# Patient Record
Sex: MALE | Race: WHITE | Employment: FULL TIME | URBAN - METROPOLITAN AREA
[De-identification: names, ages, dates, MRNs, and addresses within clinical notes are randomized per-mention and may not be internally consistent; named-entity substitution may affect disease eponyms.]

---

## 2017-06-08 ENCOUNTER — TRANSFERRED RECORDS (OUTPATIENT)
Dept: HEALTH INFORMATION MANAGEMENT | Facility: CLINIC | Age: 57
End: 2017-06-08

## 2017-06-19 ENCOUNTER — TRANSFERRED RECORDS (OUTPATIENT)
Dept: HEALTH INFORMATION MANAGEMENT | Facility: CLINIC | Age: 57
End: 2017-06-19

## 2018-02-15 ENCOUNTER — TELEPHONE (OUTPATIENT)
Dept: OTHER | Facility: CLINIC | Age: 58
End: 2018-02-15

## 2018-02-15 NOTE — TELEPHONE ENCOUNTER
2/15/2018    Call Regarding Onboarding P1 MMB    Attempt 1    Message on voicemail    Comments: YES-3 DEP      Outreach   Enma Palmer

## 2018-02-19 ENCOUNTER — OFFICE VISIT (OUTPATIENT)
Dept: INTERNAL MEDICINE | Facility: CLINIC | Age: 58
End: 2018-02-19
Payer: COMMERCIAL

## 2018-02-19 VITALS
WEIGHT: 247.9 LBS | OXYGEN SATURATION: 96 % | TEMPERATURE: 98.3 F | DIASTOLIC BLOOD PRESSURE: 72 MMHG | BODY MASS INDEX: 37.69 KG/M2 | SYSTOLIC BLOOD PRESSURE: 122 MMHG | HEART RATE: 99 BPM

## 2018-02-19 DIAGNOSIS — E78.5 HYPERLIPIDEMIA LDL GOAL <100: ICD-10-CM

## 2018-02-19 DIAGNOSIS — Z12.11 SCREEN FOR COLON CANCER: ICD-10-CM

## 2018-02-19 DIAGNOSIS — E11.65 TYPE 2 DIABETES MELLITUS WITH HYPERGLYCEMIA, WITHOUT LONG-TERM CURRENT USE OF INSULIN (H): Primary | ICD-10-CM

## 2018-02-19 DIAGNOSIS — I10 ESSENTIAL HYPERTENSION, BENIGN: ICD-10-CM

## 2018-02-19 LAB — HBA1C MFR BLD: 9 % (ref 4.3–6)

## 2018-02-19 PROCEDURE — 99204 OFFICE O/P NEW MOD 45 MIN: CPT | Performed by: INTERNAL MEDICINE

## 2018-02-19 PROCEDURE — 83036 HEMOGLOBIN GLYCOSYLATED A1C: CPT | Performed by: INTERNAL MEDICINE

## 2018-02-19 PROCEDURE — 36415 COLL VENOUS BLD VENIPUNCTURE: CPT | Performed by: INTERNAL MEDICINE

## 2018-02-19 PROCEDURE — 84443 ASSAY THYROID STIM HORMONE: CPT | Performed by: INTERNAL MEDICINE

## 2018-02-19 PROCEDURE — 82043 UR ALBUMIN QUANTITATIVE: CPT | Performed by: INTERNAL MEDICINE

## 2018-02-19 PROCEDURE — 80053 COMPREHEN METABOLIC PANEL: CPT | Performed by: INTERNAL MEDICINE

## 2018-02-19 PROCEDURE — 80061 LIPID PANEL: CPT | Performed by: INTERNAL MEDICINE

## 2018-02-19 RX ORDER — TRIAMCINOLONE ACETONIDE 1 MG/G
CREAM TOPICAL
Refills: 2 | COMMUNITY
Start: 2017-04-24 | End: 2019-07-05

## 2018-02-19 RX ORDER — DAPAGLIFLOZIN 10 MG/1
10 TABLET, FILM COATED ORAL DAILY
COMMUNITY
End: 2018-09-05

## 2018-02-19 RX ORDER — LOSARTAN POTASSIUM AND HYDROCHLOROTHIAZIDE 25; 100 MG/1; MG/1
1 TABLET ORAL EVERY MORNING
Refills: 3 | COMMUNITY
Start: 2018-01-02 | End: 2018-04-09

## 2018-02-19 RX ORDER — METFORMIN HCL 500 MG
2000 TABLET, EXTENDED RELEASE 24 HR ORAL
COMMUNITY
End: 2018-06-19

## 2018-02-19 NOTE — PROGRESS NOTES
SUBJECTIVE:   Garrick Espinosa is a 57 year old male who presents to clinic today to establish care and f/u on  the following health issues:      Diabetes Follow-up    Patient is checking blood sugars: once daily.  Results are as follows:         am - 126-200    Diabetic concerns: None     Symptoms of hypoglycemia (low blood sugar): none     Paresthesias (numbness or burning in feet) or sores: No     Date of last diabetic eye exam: 2017    Hyperlipidemia Follow-Up      Rate your low fat/cholesterol diet?: good    Taking statin?  Yes, no muscle aches from statin    Other lipid medications/supplements?:  none    Hypertension Follow-up      Outpatient blood pressures are not being checked.    Low Salt Diet: no added salt    BP Readings from Last 2 Encounters:   02/19/18 122/72   05/29/12 132/83     Hemoglobin A1C (%)   Date Value   02/28/2011 8.0 (H)   08/20/2010 7.7 (H)     LDL Cholesterol Calculated (mg/dL)   Date Value   02/28/2011 88   08/20/2010 58       Amount of exercise or physical activity: None    Problems taking medications regularly: No    Medication side effects: none    Diet: low salt      Patient Active Problem List   Diagnosis     Essential hypertension, benign     Hyperlipidemia LDL goal <100     Type 2 diabetes, HbA1C goal < 8% (H)     Obesity     Elevated liver enzymes     Psoriasis     Past Surgical History:   Procedure Laterality Date     C NONSPECIFIC PROCEDURE  12/06    R ankle surgery      NECK SURGERY  2011    hemilaminectomy        Social History   Substance Use Topics     Smoking status: Never Smoker     Smokeless tobacco: Never Used     Alcohol use Yes      Comment: RARELY      Family History   Problem Relation Age of Onset     DIABETES Father      Born 1935, also HTN     Neurologic Disorder Mother      Born ~1937, a few small strokes         Current Outpatient Prescriptions   Medication Sig Dispense Refill     triamcinolone (KENALOG) 0.1 % cream APPLY THIN LAYER TO RASH ON SKIN TWICE DAILY  FOR NO MORE THAN 10-14 DAYS  2     Ascorbic Acid (VITAMIN C PO) Take by mouth daily       Thiamine HCl (VITAMIN B-1 PO) Take by mouth daily       doxylamine (UNISOM) 25 MG TABS tablet Take 25 mg by mouth At Bedtime       MELATONIN PO Take by mouth nightly as needed       ASPIRIN PO Take 162 mg by mouth daily       dapagliflozin (FARXIGA) 10 MG TABS tablet Take 10 mg by mouth daily       GLIPIZIDE XL PO Take 10 mg by mouth daily (with breakfast)       metFORMIN (GLUCOPHAGE-XR) 500 MG 24 hr tablet Take 2,000 mg by mouth daily (with dinner)       simvastatin (ZOCOR) 40 MG tablet Take 40 mg by mouth At Bedtime.       Lancet Devices ([employer] LANCING DEV) MISC 1 strip 4 times daily. 200 each 11     glucose blood VI test strips strip by In Vitro route 4 times daily. 3 Box 3     TYLENOL EXTRA STRENGTH 500 MG PO TABS 1 TABLET EVERY 4 HOURS AS NEEDED       MULTI-DAY VITAMINS OR TABS 1 TABLET DAILY       losartan-hydrochlorothiazide (HYZAAR) 100-25 MG per tablet Take 1 tablet by mouth every morning  3     ALLEGRA 180 MG OR TABS 1 daily for allergy (Patient not taking: No sig reported) 90 3       Reviewed and updated as needed this visit by clinical staff  Tobacco  Allergies  Meds  Problems  Med Hx  Surg Hx  Fam Hx  Soc Hx        Reviewed and updated as needed this visit by Provider         ROS:  CONSTITUTIONAL: NEGATIVE for fever, chills, change in weight  EYES: NEGATIVE for vision changes or irritation  ENT/MOUTH: NEGATIVE for ear, mouth and throat problems  RESP: NEGATIVE for significant cough or SOB  CV: NEGATIVE for chest pain, palpitations or peripheral edema  MUSCULOSKELETAL: NEGATIVE for significant arthralgias or myalgia  NEURO: NEGATIVE for weakness, dizziness or paresthesias  ENDOCRINE: NEGATIVE for temperature intolerance, skin/hair changes  ROS otherwise negative    OBJECTIVE:                                                    /72  Pulse 99  Temp 98.3  F (36.8  C) (Oral)  Wt 247 lb 14.4  oz (112.4 kg)  SpO2 96%  BMI 37.69 kg/m2  Body mass index is 37.69 kg/(m^2).   GENERAL: healthy, alert, well nourished, well hydrated, no distress  EYES: Eyes grossly normal to inspection, extraocular movements - intact, and PERRL  HENT: ear canals- normal; TMs- normal; Nose- normal; Mouth- no ulcers, no lesions  NECK: no tenderness, no adenopathy, no asymmetry, no masses, no stiffness; thyroid- normal to palpation  RESP: lungs clear to auscultation - no rales, no rhonchi, no wheezes  CV: regular rates and rhythm, normal S1 S2,    MS: extremities- no gross deformities noted, no edema  NEURO: strength and tone- normal, sensory exam- grossly normal, mentation- intact, speech- normal, reflexes- symmetric  Diabetic foot exam: normal DP and PT pulses, no trophic changes or ulcerative lesions, normal sensory exam and normal monofilament exam         ASSESSMENT/PLAN:                                                        1. Type 2 diabetes mellitus with hyperglycemia, without long-term current use of insulin (H)  --On metformin.500 mg -4 tablets daily, also on  Farxiga 10 mg daily and glipizide 10 mg daily,   --A1c is 9.0 - Diabetes is not under control, increase glipizide to 10 mg twice daily, continue metformin and Farxiga  as before,advised to  follow ADA diet and regular exercise and will repeat A1c in 3 months.  - Comprehensive metabolic panel  - Lipid panel reflex to direct LDL Fasting  - TSH with free T4 reflex  - Hemoglobin A1c  - Albumin Random Urine Quantitative with Creat Ratio      2. Hyperlipidemia LDL goal <100  -Check lipid panel, on simvastatin 40 mg daily, triglycerides high, follow low-fat diet regular exercise.      3. Essential hypertension, benign  -Blood pressure well controlled. Continue losartan hydrochlorothiazide 100/25 mg daily.Advised to follow low salt diet and exercise and f/u in 6 mths.       4. Screen for colon cancer  --Patient does not like to do colonoscopy. Will get stool test.  -  Fecal colorectal cancer screen FIT; Future       Odette Hicks MD  [facility]

## 2018-02-19 NOTE — NURSING NOTE
"Chief Complaint   Patient presents with     Hypertension     Diabetes     Lipids       Initial /72  Pulse 99  Temp 98.3  F (36.8  C) (Oral)  Wt 247 lb 14.4 oz (112.4 kg)  SpO2 96%  BMI 37.69 kg/m2 Estimated body mass index is 37.69 kg/(m^2) as calculated from the following:    Height as of 5/29/12: 5' 8\" (1.727 m).    Weight as of this encounter: 247 lb 14.4 oz (112.4 kg).  Medication Reconciliation: complete    "

## 2018-02-19 NOTE — MR AVS SNAPSHOT
After Visit Summary   2/19/2018    Garrick Espinosa    MRN: [id number]           Patient Information     Date Of Birth          1960        Visit Information        Provider Department      2/19/2018 9:20 AM Odette Hicks MD [facility]        Today's Diagnoses     Type 2 diabetes mellitus with hyperglycemia, without long-term current use of insulin (H)    -  1    Hyperlipidemia LDL goal <100        Essential hypertension, benign        Screen for colon cancer           Follow-ups after your visit        Who to contact     If you have questions or need follow up information about today's clinic visit or your schedule please contact [facility] directly at [phone].  Normal or non-critical lab and imaging results will be communicated to you by MyChart, letter or phone within 4 business days after the clinic has received the results. If you do not hear from us within 7 days, please contact the clinic through [employer] or phone. If you have a critical or abnormal lab result, we will notify you by phone as soon as possible.  Submit refill requests through [employer] or call your pharmacy and they will forward the refill request to us. Please allow 3 business days for your refill to be completed.          Additional Information About Your Visit        MyChart Information     [employer] gives you secure access to your electronic health record. If you see a primary care provider, you can also send messages to your care team and make appointments. If you have questions, please call your primary care clinic.  If you do not have a primary care provider, please call [phone] and they will assist you.        Care EveryWhere ID     This is your Care EveryWhere ID. This could be used by other organizations to access your [place] medical records  [id number]        Your Vitals Were     Pulse Temperature Pulse Oximetry BMI (Body Mass Index)          99 98.3  F (36.8   C) (Oral) 96% 37.69 kg/m2         Blood Pressure from Last 3 Encounters:   02/19/18 122/72   05/29/12 132/83   06/03/11 114/60    Weight from Last 3 Encounters:   02/19/18 247 lb 14.4 oz (112.4 kg)   05/29/12 271 lb (122.9 kg)   06/03/11 267 lb 8 oz (121.3 kg)              We Performed the Following     Albumin Random Urine Quantitative with Creat Ratio     Comprehensive metabolic panel     Hemoglobin A1c     Lipid panel reflex to direct LDL Fasting     TSH with free T4 reflex          Today's Medication Changes          These changes are accurate as of 2/19/18 11:59 PM.  If you have any questions, ask your nurse or doctor.               These medicines have changed or have updated prescriptions.        Dose/Directions    glipiZIDE XL 10 MG 24 hr tablet   This may have changed:    - medication strength  - when to take this   Generic drug:  glipiZIDE   Changed by:  Odette Hicks MD        Dose:  10 mg   Take 1 tablet (10 mg) by mouth 2 times daily   Quantity:  60 tablet   Refills:  3       metFORMIN 500 MG 24 hr tablet   Commonly known as:  GLUCOPHAGE-XR   This may have changed:  Another medication with the same name was removed. Continue taking this medication, and follow the directions you see here.   Changed by:  Odette Hicks MD        Dose:  2000 mg   Take 2,000 mg by mouth daily (with dinner)   Refills:  0         Stop taking these medicines if you haven't already. Please contact your care team if you have questions.     aspirin 81 MG tablet   Stopped by:  Odette Hicks MD           FLEXERIL 5 MG tablet   Generic drug:  cyclobenzaprine   Stopped by:  Odette Hicks MD                    Primary Care Provider Office Phone # Fax #    Odette Hicks [phone]       [address] [facility] [address]        Equal Access to Services     UDAY SOLIS AH: Jessa Alexander, donnie gilbert, jason wells,  diamond simonserene farah'aan ah. So [facility] [phone].    ATENCIÓN: Si maryla brennon, tiene a valente disposición servicios gratuitos de asistencia lingüística. Pratik bhatia [phone].    We comply with applicable federal civil rights laws and [place] laws. We do not discriminate on the basis of race, color, national origin, age, disability, sex, sexual orientation, or gender identity.            Thank you!     Thank you for choosing [facility]  for your care. Our goal is always to provide you with excellent care. Hearing back from our patients is one way we can continue to improve our services. Please take a few minutes to complete the written survey that you may receive in the mail after your visit with us. Thank you!             Your Updated Medication List - Protect others around you: Learn how to safely use, store and throw away your medicines at www.disposemymeds.org.          This list is accurate as of 2/19/18 11:59 PM.  Always use your most recent med list.                   Brand Name Dispense Instructions for use Diagnosis    ALLEGRA 180 MG tablet   Generic drug:  fexofenadine     90    1 daily for allergy    Allergies       ASPIRIN PO      Take 162 mg by mouth daily        blood glucose monitoring test strip    no brand specified    3 Box    by In Vitro route 4 times daily.    Type 2 diabetes, HbA1c goal < 7% (H)       dapagliflozin 10 MG Tabs tablet    FARXIGA     Take 10 mg by mouth daily        doxylamine 25 MG Tabs tablet    UNISOM     Take 25 mg by mouth At Bedtime        glipiZIDE XL 10 MG 24 hr tablet   Generic drug:  glipiZIDE     60 tablet    Take 1 tablet (10 mg) by mouth 2 times daily        losartan-hydrochlorothiazide 100-25 MG per tablet    HYZAAR     Take 1 tablet by mouth every morning        MELATONIN PO      Take by mouth nightly as needed        metFORMIN 500 MG 24 hr tablet    GLUCOPHAGE-XR     Take 2,000 mg by mouth daily (with dinner)        MULTI-DAY vitamin   S Tabs      1 TABLET DAILY    Routine general medical examination at a health care facility       SIMPLE DIAGNOSTICS LANCING DEV Misc     200 each    1 strip 4 times daily.        simvastatin 40 MG tablet    ZOCOR     Take 40 mg by mouth At Bedtime.        triamcinolone 0.1 % cream    KENALOG     APPLY THIN LAYER TO RASH ON SKIN TWICE DAILY FOR NO MORE THAN 10-14 DAYS        TYLENOL 500 MG tablet   Generic drug:  acetaminophen      1 TABLET EVERY 4 HOURS AS NEEDED        VITAMIN B-1 PO      Take by mouth daily        VITAMIN C PO      Take by mouth daily

## 2018-02-20 LAB
ALBUMIN SERPL-MCNC: 4 G/DL (ref 3.4–5)
ALP SERPL-CCNC: 64 U/L (ref 40–150)
ALT SERPL W P-5'-P-CCNC: 35 U/L (ref 0–70)
ANION GAP SERPL CALCULATED.3IONS-SCNC: 9 MMOL/L (ref 3–14)
AST SERPL W P-5'-P-CCNC: 45 U/L (ref 0–45)
BILIRUB SERPL-MCNC: 0.4 MG/DL (ref 0.2–1.3)
BUN SERPL-MCNC: 20 MG/DL (ref 7–30)
CALCIUM SERPL-MCNC: 9 MG/DL (ref 8.5–10.1)
CHLORIDE SERPL-SCNC: 105 MMOL/L (ref 94–109)
CHOLEST SERPL-MCNC: 170 MG/DL
CO2 SERPL-SCNC: 27 MMOL/L (ref 20–32)
CREAT SERPL-MCNC: 1.02 MG/DL (ref 0.66–1.25)
CREAT UR-MCNC: 63 MG/DL
GFR SERPL CREATININE-BSD FRML MDRD: 75 ML/MIN/1.7M2
GLUCOSE SERPL-MCNC: 177 MG/DL (ref 70–99)
HDLC SERPL-MCNC: 40 MG/DL
LDLC SERPL CALC-MCNC: 52 MG/DL
MICROALBUMIN UR-MCNC: 20 MG/L
MICROALBUMIN/CREAT UR: 32.59 MG/G CR (ref 0–17)
NONHDLC SERPL-MCNC: 130 MG/DL
POTASSIUM SERPL-SCNC: 4.4 MMOL/L (ref 3.4–5.3)
PROT SERPL-MCNC: 7.5 G/DL (ref 6.8–8.8)
SODIUM SERPL-SCNC: 141 MMOL/L (ref 133–144)
TRIGL SERPL-MCNC: 392 MG/DL
TSH SERPL DL<=0.005 MIU/L-ACNC: 3.11 MU/L (ref 0.4–4)

## 2018-02-24 RX ORDER — GLIPIZIDE 10 MG/1
10 TABLET, FILM COATED, EXTENDED RELEASE ORAL
Qty: 60 TABLET | Refills: 3 | COMMUNITY
Start: 2018-02-24 | End: 2018-04-01

## 2018-03-12 PROCEDURE — 82274 ASSAY TEST FOR BLOOD FECAL: CPT | Performed by: INTERNAL MEDICINE

## 2018-03-14 DIAGNOSIS — Z12.11 SCREEN FOR COLON CANCER: ICD-10-CM

## 2018-03-14 LAB — HEMOCCULT STL QL IA: NEGATIVE

## 2018-03-20 NOTE — TELEPHONE ENCOUNTER
3/20/2018    Call Regarding Onboarding P1 MMB    Attempt 2    Message on voicemail     Comments: 1 adult, 2 child       Outreach   CC

## 2018-04-03 PROBLEM — E11.9 TYPE 2 DIABETES MELLITUS WITHOUT COMPLICATION, WITHOUT LONG-TERM CURRENT USE OF INSULIN (H): Status: ACTIVE | Noted: 2018-04-03

## 2018-04-19 NOTE — TELEPHONE ENCOUNTER
4/19/2018      Call Regarding Onboarding P1 MMB     Attempt 3     Message on voicemail      Comments:       Outreach   JUDE VALERO

## 2018-04-20 ENCOUNTER — TRANSFERRED RECORDS (OUTPATIENT)
Dept: HEALTH INFORMATION MANAGEMENT | Facility: CLINIC | Age: 58
End: 2018-04-20

## 2018-05-16 ENCOUNTER — MYC REFILL (OUTPATIENT)
Dept: INTERNAL MEDICINE | Facility: CLINIC | Age: 58
End: 2018-05-16

## 2018-05-16 DIAGNOSIS — E11.9 TYPE 2 DIABETES MELLITUS WITHOUT COMPLICATION, WITHOUT LONG-TERM CURRENT USE OF INSULIN (H): ICD-10-CM

## 2018-05-17 RX ORDER — GLIPIZIDE 10 MG/1
10 TABLET, FILM COATED, EXTENDED RELEASE ORAL
Qty: 60 TABLET | Refills: 0 | Status: SHIPPED | OUTPATIENT
Start: 2018-05-17 | End: 2018-06-25

## 2018-05-17 NOTE — TELEPHONE ENCOUNTER
Message from MyChart:  Original authorizing provider: MD Garrick Mcbride would like a refill of the following medications:  glipiZIDE (GLIPIZIDE XL) 10 MG 24 hr tablet [Odette Hicks MD]    Preferred pharmacy: [address] [address] [address]    Comment:

## 2018-05-17 NOTE — TELEPHONE ENCOUNTER
"Requested Prescriptions   Pending Prescriptions Disp Refills     glipiZIDE (GLIPIZIDE XL) 10 MG 24 hr tablet 60 tablet 1     Sig: Take 1 tablet (10 mg) by mouth 2 times daily    Sulfonylurea Agents Passed    5/17/2018 11:19 AM       Passed - Blood pressure less than 140/90 in past 6 months    BP Readings from Last 3 Encounters:   02/19/18 122/72   05/29/12 132/83   06/03/11 114/60                Passed - Patient has documented LDL within the past 12 mos.    Recent Labs   Lab Test  02/19/18   1006   LDL  52            Passed - Patient has had a Microalbumin in the past 12 mos.    Recent Labs   Lab Test  02/19/18   1006   MICROL  20   UMALCR  32.59*            Passed - Patient has documented A1c within the specified period of time.    If HgbA1C is 8 or greater, it needs to be on file within the past 3 months.  If less than 8, must be on file within the past 6 months.     Recent Labs   Lab Test  02/19/18   1006   A1C  9.0*            Passed - Patient is age 18 or older       Passed - Patient has a recent creatinine (normal) within the past 12 mos.    Recent Labs   Lab Test  02/19/18   1006   CR  1.02            Passed - Recent (6 mo) or future (30 days) visit within the authorizing provider's specialty    Patient had office visit in the last 6 months or has a visit in the next 30 days with authorizing provider or within the authorizing provider's specialty.  See \"Patient Info\" tab in inbasket, or \"Choose Columns\" in Meds & Orders section of the refill encounter.              Per 2-19-18 result note message from PCP:  Diabetes is not under control, A1c is 9.0 , please increase glipizide to 10 mg twice daily and will repeat A1c in 3 months.    Med refilled x 1.  And mychart message sent advising pt he is due for A1C in May 2018.    Future order in chart.        "

## 2018-05-25 ENCOUNTER — MYC MEDICAL ADVICE (OUTPATIENT)
Dept: INTERNAL MEDICINE | Facility: CLINIC | Age: 58
End: 2018-05-25

## 2018-06-19 DIAGNOSIS — E11.9 TYPE 2 DIABETES MELLITUS WITHOUT COMPLICATION, WITHOUT LONG-TERM CURRENT USE OF INSULIN (H): Primary | ICD-10-CM

## 2018-06-19 RX ORDER — METFORMIN HCL 500 MG
2000 TABLET, EXTENDED RELEASE 24 HR ORAL
Qty: 120 TABLET | Refills: 3 | Status: SHIPPED | OUTPATIENT
Start: 2018-06-19 | End: 2018-07-24

## 2018-06-19 NOTE — TELEPHONE ENCOUNTER
Metformin faxed, A1c was high  at last OV, advise to recheck A1c in 05/18. Please advise lab appt to recheck A1c.

## 2018-06-19 NOTE — TELEPHONE ENCOUNTER
Last Written Prescription Date:  unknown  Last Fill Quantity: unknown,  # refills: unknown   Last office visit: 2/19/2018 with prescribing provider:  02/19/18 Dr. Hicks   Future Office Visit:   Next 5 appointments (look out 90 days)     Jul 24, 2018  5:20 PM CDT   Minnie Cuevas with Ibrahima Smith MD   [facility]    [address] [place] [id number] [phone]

## 2018-06-19 NOTE — TELEPHONE ENCOUNTER
Spoke with Patient , he states there was a Medcal emergency with his mother ( she fell and had multiple fractures of  her facial bones ) and missed his appiontment he will make another lab only appiontment JOSE ELIAS Jhaveri LPN

## 2018-06-25 DIAGNOSIS — E11.9 TYPE 2 DIABETES MELLITUS WITHOUT COMPLICATION, WITHOUT LONG-TERM CURRENT USE OF INSULIN (H): ICD-10-CM

## 2018-06-27 NOTE — TELEPHONE ENCOUNTER
"Requested Prescriptions   Pending Prescriptions Disp Refills     simvastatin (ZOCOR) 40 MG tablet [Pharmacy Med Name: SIMVASTATIN 40 MG TABLET] 30 tablet 0    Requested Prescriptions   Pending Prescriptions Disp Refills     simvastatin (ZOCOR) 40 MG tablet [Pharmacy Med Name: SIMVASTATIN 40 MG TABLET] 30 tablet 0    Last Written Prescription Date:  historic  Last Fill Quantity: n/a,  # refills: n/a   Last office visit: 2/19/2018 with prescribing provider:     Future Office Visit:   Next 5 appointments (look out 90 days)     Jul 24, 2018  5:20 PM CDT   Minnie Cuevas [facility] Smith [facility]    [address] [place]  [facility] [id number] [phone]                Sig: TAKE 1 TAB BY MOUTH AT BEDTIME    Statins Protocol Passed    6/25/2018  8:58 PM       Passed - LDL on file in past 12 months    Recent Labs   Lab Test  02/19/18   1006   LDL  52            Passed - No abnormal creatine kinase in past 12 months    Recent Labs   Lab Test  05/21/10   0817   CKT  190               Passed - Recent (12 mo) or future (30 days) visit within the authorizing provider's specialty    Patient had office visit in the last 12 months or has a visit in the next 30 days with authorizing provider or within the authorizing provider's specialty.  See \"Patient Info\" tab in inbasket, or \"Choose Columns\" in Meds & Orders section of the refill encounter.           Passed - Patient is age 18 or older        glipiZIDE (GLUCOTROL XL) 10 MG 24 hr tablet [Pharmacy Med Name: GLIPIZIDE ER 10 MG TABLET] 60 tablet 0    Last Written Prescription Date:  05/17/2018  Last Fill Quantity: 60,  # refills: 0   Last office visit: 2/19/2018 with prescribing provider:     Future Office Visit:   Next 5 appointments (look out 90 days)     Jul 24, 2018  5:20 PM CDT   Minnie Cuevas with [facility] Smith [facility]   [facility]    [address]  [facility] [address]   [phone]                Sig: TAKE 1 TABLET (10 MG) BY MOUTH 2 TIMES DAILY    Sulfonylurea Agents Failed    6/25/2018  8:58 PM       Failed - Patient has documented A1c within the specified period of time.    If HgbA1C is 8 or greater, it needs to be on file within the past 3 months.  If less than 8, must be on file within the past 6 months.     Recent Labs   Lab Test  02/19/18   1006   A1C  9.0*            Passed - Blood pressure less than 140/90 in past 6 months    BP Readings from Last 3 Encounters:   02/19/18 122/72   05/29/12 132/83   06/03/11 114/60                Passed - Patient has documented LDL within the past 12 mos.    Recent Labs   Lab Test  02/19/18   1006   LDL  52            Passed - Patient has had a Microalbumin in the past 12 mos.    Recent Labs   Lab Test  02/19/18   1006   MICROL  20   UMALCR  32.59*            Passed - Patient is age 18 or older       Passed - Patient has a recent creatinine (normal) within the past 12 mos.    Recent Labs   Lab Test  02/19/18   1006   CR  1.02            Passed - Recent (6 mo) or future (30 days) visit within the authorizing provider's specialty    Patient had office visit in the last 6 months or has a visit in the next 30 days with authorizing provider or within the authorizing provider's specialty.  See \"Patient Info\" tab in inbasket, or \"Choose Columns\" in Meds & Orders section of the refill encounter.           Sig: TAKE 1 TAB BY MOUTH AT BEDTIME    Statins Protocol Passed    6/25/2018  8:58 PM       Passed - LDL on file in past 12 months    Recent Labs   Lab Test  02/19/18   1006   LDL  52            Passed - No abnormal creatine kinase in past 12 months    Recent Labs   Lab Test  05/21/10   0817   CKT  190               Passed - Recent (12 mo) or future (30 days) visit within the authorizing provider's specialty    Patient had office visit in the last 12 months or has a visit in the next 30 days with authorizing " "provider or within the authorizing provider's specialty.  See \"Patient Info\" tab in inbasket, or \"Choose Columns\" in Meds & Orders section of the refill encounter.           Passed - Patient is age 18 or older        glipiZIDE (GLUCOTROL XL) 10 MG 24 hr tablet [Pharmacy Med Name: GLIPIZIDE ER 10 MG TABLET] 60 tablet 0     Sig: TAKE 1 TABLET (10 MG) BY MOUTH 2 TIMES DAILY    Sulfonylurea Agents Failed    6/25/2018  8:58 PM       Failed - Patient has documented A1c within the specified period of time.    If HgbA1C is 8 or greater, it needs to be on file within the past 3 months.  If less than 8, must be on file within the past 6 months.     Recent Labs   Lab Test  02/19/18   1006   A1C  9.0*            Passed - Blood pressure less than 140/90 in past 6 months    BP Readings from Last 3 Encounters:   02/19/18 122/72   05/29/12 132/83   06/03/11 114/60                Passed - Patient has documented LDL within the past 12 mos.    Recent Labs   Lab Test  02/19/18   1006   LDL  52            Passed - Patient has had a Microalbumin in the past 12 mos.    Recent Labs   Lab Test  02/19/18   1006   MICROL  20   UMALCR  32.59*            Passed - Patient is age 18 or older       Passed - Patient has a recent creatinine (normal) within the past 12 mos.    Recent Labs   Lab Test  02/19/18   1006   CR  1.02            Passed - Recent (6 mo) or future (30 days) visit within the authorizing provider's specialty    Patient had office visit in the last 6 months or has a visit in the next 30 days with authorizing provider or within the authorizing provider's specialty.  See \"Patient Info\" tab in inbasket, or \"Choose Columns\" in Meds & Orders section of the refill encounter.            "

## 2018-06-28 RX ORDER — GLIPIZIDE 10 MG/1
TABLET, FILM COATED, EXTENDED RELEASE ORAL
Qty: 60 TABLET | Refills: 0 | Status: SHIPPED | OUTPATIENT
Start: 2018-06-28 | End: 2018-07-24

## 2018-06-28 RX ORDER — SIMVASTATIN 40 MG
TABLET ORAL
Qty: 30 TABLET | Refills: 0 | Status: SHIPPED | OUTPATIENT
Start: 2018-06-28 | End: 2018-07-24

## 2018-07-24 ENCOUNTER — OFFICE VISIT (OUTPATIENT)
Dept: INTERNAL MEDICINE | Facility: CLINIC | Age: 58
End: 2018-07-24
Payer: COMMERCIAL

## 2018-07-24 VITALS
DIASTOLIC BLOOD PRESSURE: 72 MMHG | OXYGEN SATURATION: 97 % | HEIGHT: 68 IN | WEIGHT: 255 LBS | TEMPERATURE: 98.7 F | HEART RATE: 87 BPM | SYSTOLIC BLOOD PRESSURE: 118 MMHG | BODY MASS INDEX: 38.65 KG/M2

## 2018-07-24 DIAGNOSIS — I10 ESSENTIAL HYPERTENSION, BENIGN: ICD-10-CM

## 2018-07-24 DIAGNOSIS — Z12.5 SCREENING FOR PROSTATE CANCER: ICD-10-CM

## 2018-07-24 DIAGNOSIS — E11.9 TYPE 2 DIABETES MELLITUS WITHOUT COMPLICATION, WITHOUT LONG-TERM CURRENT USE OF INSULIN (H): ICD-10-CM

## 2018-07-24 DIAGNOSIS — E78.5 HYPERLIPIDEMIA LDL GOAL <100: ICD-10-CM

## 2018-07-24 DIAGNOSIS — Z00.00 ROUTINE GENERAL MEDICAL EXAMINATION AT A HEALTH CARE FACILITY: Primary | ICD-10-CM

## 2018-07-24 DIAGNOSIS — Z12.11 SPECIAL SCREENING FOR MALIGNANT NEOPLASMS, COLON: ICD-10-CM

## 2018-07-24 DIAGNOSIS — Z23 NEED FOR TDAP VACCINATION: ICD-10-CM

## 2018-07-24 DIAGNOSIS — Z02.9 ADMINISTRATIVE ENCOUNTER: ICD-10-CM

## 2018-07-24 LAB — HBA1C MFR BLD: 9.1 % (ref 0–5.6)

## 2018-07-24 PROCEDURE — 99396 PREV VISIT EST AGE 40-64: CPT | Mod: 25 | Performed by: INTERNAL MEDICINE

## 2018-07-24 PROCEDURE — 99213 OFFICE O/P EST LOW 20 MIN: CPT | Mod: 25 | Performed by: INTERNAL MEDICINE

## 2018-07-24 PROCEDURE — 90471 IMMUNIZATION ADMIN: CPT | Performed by: INTERNAL MEDICINE

## 2018-07-24 PROCEDURE — G0103 PSA SCREENING: HCPCS | Performed by: INTERNAL MEDICINE

## 2018-07-24 PROCEDURE — 83036 HEMOGLOBIN GLYCOSYLATED A1C: CPT | Performed by: INTERNAL MEDICINE

## 2018-07-24 PROCEDURE — 36415 COLL VENOUS BLD VENIPUNCTURE: CPT | Performed by: INTERNAL MEDICINE

## 2018-07-24 PROCEDURE — 90715 TDAP VACCINE 7 YRS/> IM: CPT | Performed by: INTERNAL MEDICINE

## 2018-07-24 RX ORDER — PIOGLITAZONEHYDROCHLORIDE 15 MG/1
15 TABLET ORAL DAILY
Qty: 90 TABLET | Refills: 1 | Status: SHIPPED | OUTPATIENT
Start: 2018-07-24 | End: 2019-01-24

## 2018-07-24 RX ORDER — METFORMIN HCL 500 MG
2000 TABLET, EXTENDED RELEASE 24 HR ORAL
Qty: 480 TABLET | Refills: 3 | Status: SHIPPED | OUTPATIENT
Start: 2018-07-24 | End: 2019-09-12

## 2018-07-24 RX ORDER — GLIPIZIDE 10 MG/1
TABLET, FILM COATED, EXTENDED RELEASE ORAL
Qty: 180 TABLET | Refills: 3 | Status: SHIPPED | OUTPATIENT
Start: 2018-07-24 | End: 2019-07-22

## 2018-07-24 RX ORDER — SIMVASTATIN 40 MG
TABLET ORAL
Qty: 90 TABLET | Refills: 3 | Status: SHIPPED | OUTPATIENT
Start: 2018-07-24 | End: 2019-07-22

## 2018-07-24 NOTE — MR AVS SNAPSHOT
After Visit Summary   7/24/2018    Garrick Espinosa    MRN: [id number]           Patient Information     Date Of Birth          1960        Visit Information        Provider Department      7/24/2018 5:20 PM Ibrahima Smith MD [facility]        Today's Diagnoses     Screening for prostate cancer    -  1    Type 2 diabetes mellitus without complication, without long-term current use of insulin (H)        Special screening for malignant neoplasms, colon        Routine general medical examination at a health care facility          Care Instructions      Preventive Health Recommendations  Male Ages 50 - 64    Yearly exam:             See your health care provider every year in order to  o   Review health changes.   o   Discuss preventive care.    o   Review your medicines if your doctor has prescribed any.     Have a cholesterol test every 5 years, or more frequently if you are at risk for high cholesterol/heart disease.     Have a diabetes test (fasting glucose) every three years. If you are at risk for diabetes, you should have this test more often.     Have a colonoscopy at age 50, or have a yearly FIT test (stool test). These exams will check for colon cancer.      Talk with your health care provider about whether or not a prostate cancer screening test (PSA) is right for you.    You should be tested each year for STDs (sexually transmitted diseases), if you re at risk.     Shots: Get a flu shot each year. Get a tetanus shot every 10 years.     Nutrition:    Eat at least 5 servings of fruits and vegetables daily.     Eat whole-grain bread, whole-wheat pasta and brown rice instead of white grains and rice.     Get adequate Calcium and Vitamin D.     Lifestyle    Exercise for at least 150 minutes a week (30 minutes a day, 5 days a week). This will help you control your weight and prevent disease.     Limit alcohol to one drink per day.     No smoking.     Wear sunscreen to  prevent skin cancer.     See your dentist every six months for an exam and cleaning.     See your eye doctor every 1 to 2 years.            Follow-ups after your visit        Additional Services     GASTROENTEROLOGY ADULT REF PROCEDURE ONLY       Last Lab Result: Creatinine (mg/dL)       Date                     Value                 02/19/2018               1.02             ----------  Body mass index is 38.77 kg/(m^2).     Needed:  No  Language:  English    Patient will be contacted to schedule procedure.     Please be aware that coverage of these services is subject to the terms and limitations of your health insurance plan.  Call member services at your health plan with any benefit or coverage questions.  Any procedures must be performed at a [place] facility OR coordinated by your clinic's referral office.    Please bring the following with you to your appointment:    (1) Any X-Rays, CTs or MRIs which have been performed.  Contact the facility where they were done to arrange for  prior to your scheduled appointment.    (2) List of current medications   (3) This referral request   (4) Any documents/labs given to you for this referral                  Who to contact     If you have questions or need follow up information about today's clinic visit or your schedule please contact [facility] directly at [phone].  Normal or non-critical lab and imaging results will be communicated to you by MyChart, letter or phone within 4 business days after the clinic has received the results. If you do not hear from us within 7 days, please contact the clinic through MyChart or phone. If you have a critical or abnormal lab result, we will notify you by phone as soon as possible.  Submit refill requests through [employer] or call your pharmacy and they will forward the refill request to us. Please allow 3 business days for your refill to be completed.          Additional Information About  "Your Visit        [employer] Information     [employer] gives you secure access to your electronic health record. If you see a primary care provider, you can also send messages to your care team and make appointments. If you have questions, please call your primary care clinic.  If you do not have a primary care provider, please call [phone] and they will assist you.        Care EveryWhere ID     This is your Care EveryWhere ID. This could be used by other organizations to access your [place] medical records  [id number]        Your Vitals Were     Pulse Temperature Height Pulse Oximetry BMI (Body Mass Index)       87 98.7  F (37.1  C) (Oral) 5' 8\" (1.727 m) 97% 38.77 kg/m2        Blood Pressure from Last 3 Encounters:   07/24/18 118/72   02/19/18 122/72   05/29/12 132/83    Weight from Last 3 Encounters:   07/24/18 255 lb (115.7 kg)   02/19/18 247 lb 14.4 oz (112.4 kg)   05/29/12 271 lb (122.9 kg)              We Performed the Following     **A1C FUTURE 1yr     GASTROENTEROLOGY ADULT REF PROCEDURE ONLY     Prostate spec antigen screen          Today's Medication Changes          These changes are accurate as of 7/24/18  6:08 PM.  If you have any questions, ask your nurse or doctor.               Start taking these medicines.        Dose/Directions    pioglitazone 15 MG tablet   Commonly known as:  ACTOS   Used for:  Type 2 diabetes mellitus without complication, without long-term current use of insulin (H)   Started by:  Ibrahima Smith MD        Dose:  15 mg   Take 1 tablet (15 mg) by mouth daily   Quantity:  90 tablet   Refills:  1         These medicines have changed or have updated prescriptions.        Dose/Directions    ALLEGRA 180 MG tablet   This may have changed:  See the new instructions.   Used for:  Allergies   Generic drug:  fexofenadine        1 daily for allergy   Quantity:  90   Refills:  3            Where to get your medicines      These medications were sent to [id number] [place] [facility] " [place], [place] [facility] [address] [facility] [address] [address]  [address], [place] [facility] [address]     Phone:  [phone]     glipiZIDE 10 MG 24 hr tablet    metFORMIN 500 MG 24 hr tablet    pioglitazone 15 MG tablet    simvastatin 40 MG tablet                Primary Care Provider Office Phone # Fax #    Odette ASIF MD Fabi[place] [phone]       [address] [facility] [address]        Equal Access to Services     [facility] : Hadii aad ku hadasho Soomaali, waaxda luqadaha, qaybta kaalmada adeegyada, waxay idiin hayaan adeeg kharash la'aan . So [facility] [phone].    ATENCIÓN: Si habla español, tiene a valente disposición servicios gratuitos de asistencia lingüística. [facility] [phone].    We comply with applicable federal civil rights laws and [place] laws. We do not discriminate on the basis of race, color, national origin, age, disability, sex, sexual orientation, or gender identity.            Thank you!     Thank you for choosing [facility]  for your care. Our goal is always to provide you with excellent care. Hearing back from our patients is one way we can continue to improve our services. Please take a few minutes to complete the written survey that you may receive in the mail after your visit with us. Thank you!             Your Updated Medication List - Protect others around you: Learn how to safely use, store and throw away your medicines at www.disposemymeds.org.          This list is accurate as of 7/24/18  6:08 PM.  Always use your most recent med list.                   Brand Name Dispense Instructions for use Diagnosis    ALLEGRA 180 MG tablet   Generic drug:  fexofenadine     90    1 daily for allergy    Allergies       ASPIRIN PO      Take 162 mg by mouth daily        blood glucose monitoring test strip    no brand specified    3 Box    by In Vitro route 4 times daily.    Type 2 diabetes, HbA1c goal < 7% (H)       dapagliflozin 10 MG Tabs tablet    FARXIGA     Take 10 mg by  mouth daily        doxylamine 25 MG Tabs tablet    UNISOM     Take 25 mg by mouth At Bedtime        glipiZIDE 10 MG 24 hr tablet    GLUCOTROL XL    180 tablet    TAKE 1 TABLET (10 MG) BY MOUTH 2 TIMES DAILY    Type 2 diabetes mellitus without complication, without long-term current use of insulin (H)       losartan-hydrochlorothiazide 100-25 MG per tablet    HYZAAR    90 tablet    Take 1 tablet by mouth every morning    Essential hypertension, benign       MELATONIN PO      Take by mouth nightly as needed        metFORMIN 500 MG 24 hr tablet    GLUCOPHAGE-XR    480 tablet    Take 4 tablets (2,000 mg) by mouth daily (with dinner)    Type 2 diabetes mellitus without complication, without long-term current use of insulin (H)       MULTI-DAY vitamin  S Tabs      1 TABLET DAILY    Routine general medical examination at a health care facility       pioglitazone 15 MG tablet    ACTOS    90 tablet    Take 1 tablet (15 mg) by mouth daily    Type 2 diabetes mellitus without complication, without long-term current use of insulin (H)       SIMPLE DIAGNOSTICS LANCING DEV Misc     200 each    1 strip 4 times daily.        simvastatin 40 MG tablet    ZOCOR    90 tablet    TAKE 1 TAB BY MOUTH AT BEDTIME    Type 2 diabetes mellitus without complication, without long-term current use of insulin (H)       triamcinolone 0.1 % cream    KENALOG     APPLY THIN LAYER TO RASH ON SKIN TWICE DAILY FOR NO MORE THAN 10-14 DAYS        TYLENOL 500 MG tablet   Generic drug:  acetaminophen      1 TABLET EVERY 4 HOURS AS NEEDED        VITAMIN B-1 PO      Take by mouth daily        VITAMIN C PO      Take by mouth daily

## 2018-07-24 NOTE — PROGRESS NOTES
SUBJECTIVE:   CC: Garrick Espinosa is an 57 year old male who presents for preventative health visit.     Healthy Habits:    Do you get at least three servings of calcium containing foods daily (dairy, green leafy vegetables, etc.)? 2-3 servings    Amount of exercise or daily activities, outside of work: 3 day(s) per week    Problems taking medications regularly No    Medication side effects: No    Have you had an eye exam in the past two years? Yes, 04/2018    Do you see a dentist twice per year? yes    Do you have sleep apnea, excessive snoring or daytime drowsiness?no, but difficulty sleeping       PROBLEMS TO ADD ON...  Has H/O DM. On diet , exercise and PO medications. Blood sugars are not well controlled. No parestesias. No hypoglycemias.  Has h/o HTN. on medical treatment. BP has been controlled. No side effects from medications. No CP, HA, dizziness. good compliance with medications and low salt diet.  Has H/O hyperlipidemia. On medical treatment and diet. No side effects. No muscle weakness, myalgias or upset stomach.     Needs form filled in for [employer].       Today's PHQ-2 Score:   PHQ-2 ( 1999 [employer]) 7/24/2018 2/19/2018   Q1: Little interest or pleasure in doing things 0 0   Q2: Feeling down, depressed or hopeless 0 0   PHQ-2 Score 0 0       Abuse: Current or Past(Physical, Sexual or Emotional)- No  Do you feel safe in your environment - Yes    Social History   Substance Use Topics     Smoking status: Never Smoker     Smokeless tobacco: Never Used     Alcohol use Yes      Comment: Occ      If you drink alcohol do you typically have >3 drinks per day or >7 drinks per week? No                      Last PSA:   PSA   Date Value Ref Range Status   08/20/2010 0.55 0 - 4 ug/L Final       Reviewed orders with patient. Reviewed health maintenance and updated orders accordingly - Yes  Labs reviewed in EPIC  BP Readings from Last 3 Encounters:   07/24/18 118/72   02/19/18 122/72   05/29/12 132/83    Wt  "Readings from Last 3 Encounters:   07/24/18 255 lb (115.7 kg)   02/19/18 247 lb 14.4 oz (112.4 kg)   05/29/12 271 lb (122.9 kg)                    Reviewed and updated as needed this visit by clinical staff  Tobacco  Allergies  Meds  Med Hx  Surg Hx  Fam Hx  Soc Hx        Reviewed and updated as needed this visit by Provider            ROS:  CONSTITUTIONAL: NEGATIVE for fever, chills, change in weight  INTEGUMENTARY/SKIN: NEGATIVE for worrisome rashes, moles or lesions  EYES: NEGATIVE for vision changes or irritation  ENT: NEGATIVE for ear, mouth and throat problems  RESP: NEGATIVE for significant cough or SOB  CV: NEGATIVE for chest pain, palpitations or peripheral edema  GI: NEGATIVE for nausea, abdominal pain, heartburn, or change in bowel habits   male: negative for dysuria, hematuria, decreased urinary stream, erectile dysfunction, urethral discharge  MUSCULOSKELETAL: NEGATIVE for significant arthralgias or myalgia  NEURO: NEGATIVE for weakness, dizziness or paresthesias  PSYCHIATRIC: NEGATIVE for changes in mood or affect    OBJECTIVE:   /72  Pulse 87  Temp 98.7  F (37.1  C) (Oral)  Ht 5' 8\" (1.727 m)  Wt 255 lb (115.7 kg)  SpO2 97%  BMI 38.77 kg/m2  EXAM:  GENERAL:obese, alert and no distress  EYES: Eyes grossly normal to inspection, PERRL and conjunctivae and sclerae normal  HENT: ear canals and TM's normal, nose and mouth without ulcers or lesions  NECK: no adenopathy, no asymmetry, masses, or scars and thyroid normal to palpation  RESP: lungs clear to auscultation - no rales, rhonchi or wheezes  CV: regular rate and rhythm, normal S1 S2, no S3 or S4, no murmur, click or rub, no peripheral edema and peripheral pulses strong  ABDOMEN: soft, nontender, no hepatosplenomegaly, no masses and bowel sounds normal  MS: no gross musculoskeletal defects noted, no edema  SKIN: no suspicious lesions or rashes  NEURO: Normal strength and tone, mentation intact and speech normal  PSYCH: mentation " "appears normal, affect normal/bright    Diagnostic Test Results:  Results for orders placed or performed in visit on 07/24/18 (from the past 24 hour(s))   **A1C FUTURE 1yr   Result Value Ref Range    Hemoglobin A1C 9.1 (H) 0 - 5.6 %       ASSESSMENT/PLAN:       ICD-10-CM    1. Routine general medical examination at a health care facility Z00.00    2. Type 2 diabetes mellitus without complication, without long-term current use of insulin (H) E11.9 **A1C FUTURE 1yr     glipiZIDE (GLUCOTROL XL) 10 MG 24 hr tablet     metFORMIN (GLUCOPHAGE-XR) 500 MG 24 hr tablet     simvastatin (ZOCOR) 40 MG tablet     pioglitazone (ACTOS) 15 MG tablet   3. Screening for prostate cancer Z12.5 Prostate spec antigen screen   4. Special screening for malignant neoplasms, colon Z12.11 GASTROENTEROLOGY ADULT REF PROCEDURE ONLY   5. Need for Tdap vaccination Z23 TDAP VACCINE (ADACEL)   6. Essential hypertension, benign I10    7. Hyperlipidemia LDL goal <100 E78.5      Assess lab   Cont treatment   Add Actos for DM control   Form for [employer] filled in     COUNSELING:  Reviewed preventive health counseling, as reflected in patient instructions       Regular exercise       Healthy diet/nutrition       Vision screening       Hearing screening       Immunizations    Vaccinated for: TDAP             Colon cancer screening       Prostate cancer screening    BP Readings from Last 1 Encounters:   07/24/18 118/72     Estimated body mass index is 38.77 kg/(m^2) as calculated from the following:    Height as of this encounter: 5' 8\" (1.727 m).    Weight as of this encounter: 255 lb (115.7 kg).      Weight management plan: Discussed healthy diet and exercise guidelines and patient will follow up in 6 months in clinic to re-evaluate.     reports that he has never smoked. He has never used smokeless tobacco.      Counseling Resources:  ATP IV Guidelines  Pooled Cohorts Equation Calculator  FRAX Risk Assessment  ICSI Preventive Guidelines  Dietary " Guidelines for Americans, 2010  USDA's MyPlate  ASA Prophylaxis  Lung CA Screening    Ibrahima Smith MD  [facility]

## 2018-07-24 NOTE — NURSING NOTE
"Vital signs:  Temp: 98.7  F (37.1  C) Temp src: Oral BP: 118/72 Pulse: 87     SpO2: 97 %     Height: 5' 8\" (172.7 cm) Weight: 255 lb (115.7 kg)  Estimated body mass index is 38.77 kg/(m^2) as calculated from the following:    Height as of this encounter: 5' 8\" (1.727 m).    Weight as of this encounter: 255 lb (115.7 kg).          "

## 2018-07-24 NOTE — LETTER
[facility] [place]  [address], [address]  [place] [address]  [phone]        July 27, 2018    Garrick Espinosa  [address]  [facility] [address]            Dear Mr. Garrick Espinosa:      The results of your recent PSA were NORMAL.      If you have any further questions or problems, please contact our office.      Sincerely,        Ibrahima Smith M.D.

## 2018-07-25 LAB — PSA SERPL-ACNC: 0.32 UG/L (ref 0–4)

## 2018-08-09 ENCOUNTER — NURSE TRIAGE (OUTPATIENT)
Dept: NURSING | Facility: CLINIC | Age: 58
End: 2018-08-09

## 2018-08-09 ENCOUNTER — HOSPITAL ENCOUNTER (EMERGENCY)
Facility: CLINIC | Age: 58
Discharge: HOME | End: 2018-08-09
Attending: EMERGENCY MEDICINE | Admitting: EMERGENCY MEDICINE
Payer: COMMERCIAL

## 2018-08-09 VITALS
HEART RATE: 79 BPM | OXYGEN SATURATION: 96 % | HEIGHT: 68 IN | WEIGHT: 250 LBS | TEMPERATURE: 97.2 F | BODY MASS INDEX: 37.89 KG/M2 | RESPIRATION RATE: 20 BRPM | DIASTOLIC BLOOD PRESSURE: 91 MMHG | SYSTOLIC BLOOD PRESSURE: 159 MMHG

## 2018-08-09 DIAGNOSIS — H92.21 BLOOD IN EAR CANAL, RIGHT: ICD-10-CM

## 2018-08-09 DIAGNOSIS — R42 VERTIGO: ICD-10-CM

## 2018-08-09 DIAGNOSIS — H93.11 TINNITUS OF RIGHT EAR: ICD-10-CM

## 2018-08-09 PROCEDURE — 99282 EMERGENCY DEPT VISIT SF MDM: CPT

## 2018-08-09 RX ORDER — MECLIZINE HYDROCHLORIDE 25 MG/1
25 TABLET ORAL EVERY 6 HOURS PRN
Qty: 30 TABLET | Refills: 1 | Status: SHIPPED | OUTPATIENT
Start: 2018-08-09 | End: 2019-02-01

## 2018-08-09 ASSESSMENT — ENCOUNTER SYMPTOMS
FEVER: 0
NECK PAIN: 0

## 2018-08-09 NOTE — TELEPHONE ENCOUNTER
Reason for Disposition    [1] Bleeding AND [2] won't stop after 10 minutes of direct pressure (using correct technique)    Additional Information    Negative: Knocked out (unconscious) > 1 minute    Negative: Difficult to awaken or acting confused  (e.g., disoriented, slurred speech)    Negative: Severe neck pain    Negative: Sounds like a life-threatening emergency to the triager    Protocols used: EAR INJURY-ADULT-

## 2018-08-09 NOTE — ED AVS SNAPSHOT
[facility] Emergency Department    [address]    [facility] [address]    Phone:  [phone]    Fax:  [phone]                                       Garrick Espinosa   MRN: [id number]    Department:  [facility] Emergency Department   Date of Visit:  8/9/2018           Patient Information     Date Of Birth          1960        Your diagnoses for this visit were:     Blood in ear canal, right     Infective otitis externa, right        You were seen by Garrick Marti MD and Erlin Barnes MD.      Follow-up Information     Follow up with [facility] Emergency Department.    Specialty:  EMERGENCY MEDICINE    Why:  As needed, If symptoms worsen    Contact information:    [address]  [facility] [address] [phone]        Follow up with [facility]. Schedule an appointment as soon as possible for a visit in 1 day.    Why:  please call to make an appointment in the next 1-2 days.     Contact information:    [address]  [facility] [address] [phone]        Discharge Instructions       You came to the ER today with blood in the right ear canal and decreased hearing. Please continue to use the ear drops that were prescribed to you yesterday. Please follow up with the ENT clinic in 1-2 days. Return to the ER for increased bleeding, worsening hearing loss, dizziness, pain behind the ear, fever, neck pain or stiffness, weakness of the face or new concerns.    Discharge Instructions  Otitis Externa    Today you were seen for otitis externa which is a condition that occurs when the ear canal, which is the area that leads from the outer ear to the ear drum, becomes irritated as a result of an infection, allergy, or skin problem.   The most common symptoms of this are:  pain in the outer ear, especially when the ear is moved, itchiness of the ear, fluid or pus leaking from the ear and difficulty  "hearing clearly.  \"Swimmer's ear\" is the name for external otitis that occurs in a person who swims frequently.    Generally, every Emergency Department visit should have a follow-up clinic visit with either a primary or a specialty clinic/provider. Please follow-up as instructed by your emergency provider today.    Return to the Emergency Department if:    Your symptoms get worse, or if you develop a severe headache, stiff neck, or new symptoms.    The pain and swelling spread to your face.    You develop high fever.      Treatment:    Treatment of otitis externa aims to reduce pain and eliminate the infection.   You should take either Advil  (ibuprofen) or Tylenol  (acetaminophen) for control of the ear pain.      Ear drops -- Ear drops are usually prescribed to both reduce pain and swelling and kill the bacteria which causes external otitis. It is important to apply the ear drops correctly so that they reach the ear canal:  o Lie on your side or tilt your head towards the opposite shoulder.  o Fill the ear canal with drops.  o Lie on your side for 20 minutes or place a cotton ball in the ear canal for 20 minutes.  o Finish the entire course of treatment, even if you begin to feel better within a few days.  o Avoid getting ears wet -- during treatment, you should avoid getting the inside of your ears wet. While showering, you can place a cotton ball coated with petroleum jelly in the ear. However, you should not swim for 7 to 10 days after starting treatment. Avoid wearing hearing aids and in-ear headphones until pain improves.  o If a wick has been placed in your ear, please do not remove it until seen by your primary provider or Ear, Nose, and Throat (ENT) specialist as directed.    Prevention:    Do not clean ear canal. Ear wax serves to protect the ears from water, bacteria, and injury. Excessive cleaning or scratching can injure the skin, potentially leading to infection.    Swimming on a regular basis " removes some of the ear wax, allowing water to soften the skin. Bacteria, which normally live in the ear canal, can then enter the skin more easily.    Wearing devices that block the ear canals, such as hearing aids, headphones, or ear plugs, can increase the risk of external otitis (if worn frequently) by injuring the skin.    If you swim frequently, experts recommend the following tips to reduce the chance of developing external otitis:    Shake your ears dry after swimming.    Use ear drops after swimming to prevent ear infections; these are available at most pharmacies without a prescription.    Consider wearing ear plugs made for swimming.  If you were given a prescription for medicine here today, be sure to read all of the information (including the package insert) that comes with your prescription.  This will include important information about the medicine, its side effects, and any warnings that you need to know about.  The pharmacist who fills the prescription can provide more information and answer questions you may have about the medicine.  If you have questions or concerns that the pharmacist cannot address, please call or return to the Emergency Department.   Remember that you can always come back to the Emergency Department if you are not able to see your regular provider in the amount of time listed above, if you get any new symptoms, or if there is anything that worries you.      24 Hour Appointment Hotline       To make an appointment at any [facility], call [phone] ([phone]). If you don't have a family doctor or clinic, we will help you find one. [facility] are conveniently located to serve the needs of you and your family.             Review of your medicines      Our records show that you are taking the medicines listed below. If these are incorrect, please call your family doctor or clinic.        Dose / Directions Last dose taken    ALLEGRA 180 MG tablet   Quantity:   90   Generic drug:  fexofenadine        1 daily for allergy   Refills:  3        ASPIRIN PO   Dose:  162 mg        Take 162 mg by mouth daily   Refills:  0        blood glucose monitoring test strip   Commonly known as:  no brand specified   Quantity:  3 Box        by In Vitro route 4 times daily.   Refills:  3        dapagliflozin 10 MG Tabs tablet   Commonly known as:  FARXIGA   Dose:  10 mg        Take 10 mg by mouth daily   Refills:  0        doxylamine 25 MG Tabs tablet   Commonly known as:  UNISOM   Dose:  25 mg        Take 25 mg by mouth At Bedtime   Refills:  0        glipiZIDE 10 MG 24 hr tablet   Commonly known as:  GLUCOTROL XL   Quantity:  180 tablet        TAKE 1 TABLET (10 MG) BY MOUTH 2 TIMES DAILY   Refills:  3        losartan-hydrochlorothiazide 100-25 MG per tablet   Commonly known as:  HYZAAR   Dose:  1 tablet   Quantity:  90 tablet        Take 1 tablet by mouth every morning   Refills:  1        MELATONIN PO        Take by mouth nightly as needed   Refills:  0        metFORMIN 500 MG 24 hr tablet   Commonly known as:  GLUCOPHAGE-XR   Dose:  2000 mg   Quantity:  480 tablet        Take 4 tablets (2,000 mg) by mouth daily (with dinner)   Refills:  3        MULTI-DAY vitamin  S Tabs        1 TABLET DAILY   Refills:  0        pioglitazone 15 MG tablet   Commonly known as:  ACTOS   Dose:  15 mg   Quantity:  90 tablet        Take 1 tablet (15 mg) by mouth daily   Refills:  1        SIMPLE DIAGNOSTICS LANCING DEV Misc   Dose:  1 strip   Quantity:  200 each        1 strip 4 times daily.   Refills:  11        simvastatin 40 MG tablet   Commonly known as:  ZOCOR   Quantity:  90 tablet        TAKE 1 TAB BY MOUTH AT BEDTIME   Refills:  3        triamcinolone 0.1 % cream   Commonly known as:  KENALOG        APPLY THIN LAYER TO RASH ON SKIN TWICE DAILY FOR NO MORE THAN 10-14 DAYS   Refills:  2        TYLENOL 500 MG tablet   Generic drug:  acetaminophen        1 TABLET EVERY 4 HOURS AS NEEDED   Refills:  0         VITAMIN B-1 PO        Take by mouth daily   Refills:  0        VITAMIN C PO        Take by mouth daily   Refills:  0                Orders Needing Specimen Collection     None      Pending Results     No orders found from 8/7/2018 to 8/10/2018.            Pending Culture Results     No orders found from 8/7/2018 to 8/10/2018.            Pending Results Instructions     If you had any lab results that were not finalized at the time of your Discharge, you can call the ED Lab Result RN at [phone]. You will be contacted by this team for any positive Lab results or changes in treatment. The nurses are available 7 days a week from 10A to 6:30P.  You can leave a message 24 hours per day and they will return your call.        Test Results From Your Hospital Stay               Clinical Quality Measure: Blood Pressure Screening     Your blood pressure was checked while you were in the emergency department today. The last reading we obtained was  BP: (!) 159/91 . Please read the guidelines below about what these numbers mean and what you should do about them.  If your systolic blood pressure (the top number) is less than 120 and your diastolic blood pressure (the bottom number) is less than 80, then your blood pressure is normal. There is nothing more that you need to do about it.  If your systolic blood pressure (the top number) is 120-139 or your diastolic blood pressure (the bottom number) is 80-89, your blood pressure may be higher than it should be. You should have your blood pressure rechecked within a year by a primary care provider.  If your systolic blood pressure (the top number) is 140 or greater or your diastolic blood pressure (the bottom number) is 90 or greater, you may have high blood pressure. High blood pressure is treatable, but if left untreated over time it can put you at risk for heart attack, stroke, or kidney failure. You should have your blood pressure rechecked by a primary care provider  within the next 4 weeks.  If your provider in the emergency department today gave you specific instructions to follow-up with your doctor or provider even sooner than that, you should follow that instruction and not wait for up to 4 weeks for your follow-up visit.        Thank you for choosing [facility]       Thank you for choosing [facility] for your care. Our goal is always to provide you with excellent care. Hearing back from our patients is one way we can continue to improve our services. Please take a few minutes to complete the written survey that you may receive in the mail after you visit with us. Thank you!        [employer] Information     [employer] gives you secure access to your electronic health record. If you see a primary care provider, you can also send messages to your care team and make appointments. If you have questions, please call your primary care clinic.  If you do not have a primary care provider, please call [phone] and they will assist you.        Care EveryWhere ID     This is your Care EveryWhere ID. This could be used by other organizations to access your [facility] medical records  [id number]        Equal Access to Services     UDAY SOLIS : Hadii kevin rivas Sokristie, waaxda luqadaha, qaybta kaalmada ademary, diamond navarro. So [facility] [phone].    ATENCIÓN: Si habla español, tiene a valente disposición servicios gratuitos de asistencia lingüística. [facility] [phone].    We comply with applicable federal civil rights laws and [place] laws. We do not discriminate on the basis of race, color, national origin, age, disability, sex, sexual orientation, or gender identity.            After Visit Summary       This is your record. Keep this with you and show to your community pharmacist(s) and doctor(s) at your next visit.

## 2018-08-09 NOTE — ED TRIAGE NOTES
[facility] While doing an exam pt thinks his inner ear got scraped. C/o bleeding from ear, also c/o decreased hearing. Had swimmers ear 2 weeks ago, used drops without relief.

## 2018-08-09 NOTE — DISCHARGE INSTRUCTIONS
"You came to the ER today with blood in the right ear canal and decreased hearing with dizziness. Please continue to use the ear drops that were prescribed to you yesterday. Take the oral antibiotics as prescribed. Please follow up with the ENT clinic this afternoon. Return to the ER for increased bleeding, worsening hearing loss, dizziness, pain behind the ear, fever, neck pain or stiffness, weakness of the face or new concerns.    Discharge Instructions  Otitis Externa    Today you were seen for otitis externa which is a condition that occurs when the ear canal, which is the area that leads from the outer ear to the ear drum, becomes irritated as a result of an infection, allergy, or skin problem.   The most common symptoms of this are:  pain in the outer ear, especially when the ear is moved, itchiness of the ear, fluid or pus leaking from the ear and difficulty hearing clearly.  \"Swimmer's ear\" is the name for external otitis that occurs in a person who swims frequently.    Generally, every Emergency Department visit should have a follow-up clinic visit with either a primary or a specialty clinic/provider. Please follow-up as instructed by your emergency provider today.    Return to the Emergency Department if:    Your symptoms get worse, or if you develop a severe headache, stiff neck, or new symptoms.    The pain and swelling spread to your face.    You develop high fever.      Treatment:    Treatment of otitis externa aims to reduce pain and eliminate the infection.   You should take either Advil  (ibuprofen) or Tylenol  (acetaminophen) for control of the ear pain.      Ear drops -- Ear drops are usually prescribed to both reduce pain and swelling and kill the bacteria which causes external otitis. It is important to apply the ear drops correctly so that they reach the ear canal:  o Lie on your side or tilt your head towards the opposite shoulder.  o Fill the ear canal with drops.  o Lie on your side for 20 " minutes or place a cotton ball in the ear canal for 20 minutes.  o Finish the entire course of treatment, even if you begin to feel better within a few days.  o Avoid getting ears wet -- during treatment, you should avoid getting the inside of your ears wet. While showering, you can place a cotton ball coated with petroleum jelly in the ear. However, you should not swim for 7 to 10 days after starting treatment. Avoid wearing hearing aids and in-ear headphones until pain improves.  o If a wick has been placed in your ear, please do not remove it until seen by your primary provider or Ear, Nose, and Throat (ENT) specialist as directed.    Prevention:    Do not clean ear canal. Ear wax serves to protect the ears from water, bacteria, and injury. Excessive cleaning or scratching can injure the skin, potentially leading to infection.    Swimming on a regular basis removes some of the ear wax, allowing water to soften the skin. Bacteria, which normally live in the ear canal, can then enter the skin more easily.    Wearing devices that block the ear canals, such as hearing aids, headphones, or ear plugs, can increase the risk of external otitis (if worn frequently) by injuring the skin.    If you swim frequently, experts recommend the following tips to reduce the chance of developing external otitis:    Shake your ears dry after swimming.    Use ear drops after swimming to prevent ear infections; these are available at most pharmacies without a prescription.    Consider wearing ear plugs made for swimming.  If you were given a prescription for medicine here today, be sure to read all of the information (including the package insert) that comes with your prescription.  This will include important information about the medicine, its side effects, and any warnings that you need to know about.  The pharmacist who fills the prescription can provide more information and answer questions you may have about the medicine.  If you  have questions or concerns that the pharmacist cannot address, please call or return to the Emergency Department.   Remember that you can always come back to the Emergency Department if you are not able to see your regular provider in the amount of time listed above, if you get any new symptoms, or if there is anything that worries you.

## 2018-08-09 NOTE — ED AVS SNAPSHOT
[facility] Emergency Department    [address]    [facility] [address]    Phone:  [phone]    Fax:  [phone]                                       Garrick Espinosa   MRN: [id number]    Department:  [facility] Emergency Department   Date of Visit:  8/9/2018           After Visit Summary Signature Page     I have received my discharge instructions, and my questions have been answered. I have discussed any challenges I see with this plan with the nurse or doctor.    ..........................................................................................................................................  Patient/Patient Representative Signature      ..........................................................................................................................................  Patient Representative Print Name and Relationship to Patient    ..................................................               ................................................  Date                                            Time    ..........................................................................................................................................  Reviewed by Signature/Title    ...................................................              ..............................................  Date                                                            Time

## 2018-08-09 NOTE — TELEPHONE ENCOUNTER
Garrick was diagnosed with inner ear infection and was given antibiotic.  [facility] and today is still bleeding in right ear.    Last night Garrick was bleeding on pillow and today is still bleeding.

## 2018-08-09 NOTE — ED PROVIDER NOTES
History     Chief Complaint:  Ear Injury     HPI   Garrick Espinosa is a 57 year old male who presents to the emergency department today for evaluation of an ear injury. The patient reports that two weeks ago he was swimming at the [employer] and a day later had intermittent mumbled hearing and pain in his right ear for which he went to the [facility] clinic on 7/28/18 and was given cortisporin otic drops and a 7 day supply of amoxicillin. He completed the amoxicillin script and says he also did the ear drops but was not having much relief so [facility], 8/8/18, because he was still having decreased hearing with tinnitus and mild dizziness. He describes that they gave him Ciprodex suspension and tried digging material out of his ear and since then he has notice blood coming out of his ear yesterday as well as some blood on his pillow this morning. He denies fever, head trauma, neck pain, and only feels a little off balanced. No particular modifying factors for dizziness.  He describes only mild pain in the R ear.     Allergies:  Accupril  Ace inhibitors  Cats  Dogs  Seasonal allergies      Medications:    Aspirin  Farxiga  Unisom  Glipizide  Hyzaar  Metformin  Actos  Zocor    Past Medical History:    Allergic rhinitis, cause unspecified   Diabetes mellitus    Essential hypertension, benign   Other and unspecified hyperlipidemia   Psoriasis   Sciatica     Past Surgical History:    Right ankle surgery  Hemilaminectomy     Family History:    Father: Diabetes  Mother: Neurologic disorder, cerebrovascular disease    Social History  Smoking Status: Never Smoker  Smokeless Tobacco: Never Used  Alcohol Use: Positive   Occasional    Marital Status:       Review of Systems   Constitutional: Negative for fever.   HENT: Positive for ear discharge (blood, right), ear pain (right ) and hearing loss (right ).    Musculoskeletal: Negative for neck pain.   Neurological:        Feels  "mildly off balance.   No head trauma.    All other systems reviewed and are negative.    Physical Exam     Patient Vitals for the past 24 hrs:   BP Temp Temp src Pulse Resp SpO2 Height Weight   08/09/18 0843 (!) 159/91 97.2  F (36.2  C) Oral 79 20 96 % 1.727 m (5' 8\") 113.4 kg (250 lb)     Physical Exam  CONSTITUTIONAL: Awake, no acute distress.  SKIN: Warm and dry  HENT: Oropharynx clear, mucous membranes moist. No preauricular LAD. No R mastoid ttp. No significant discomfort with movement of R pinna. R EOM with blood occluding view of TM. Face symmetric. Neck is supple and has full ROM.  EYES: Extraocular movements Intact  CV: Rate as noted,  regular rhythm. no murmurs.   PULM: Effort normal. Breath sounds are normal bilaterally.  ABD: soft, non-tender, non-distended. No rebound or guarding.   NEURO: Alert, moving all extremities. Steady gait. 5/5 strength x4 extremities.   MSK: no deformity of the extremities    Emergency Department Course     Emergency Department Course:    0847 Nursing notes and vitals reviewed.    0852 I performed an exam of the patient as documented above.     1000 I personally answered all related questions prior to discharge.    Impression & Plan      Medical Decision Making:  Garrick Espinosa is a 57 year old male who presents to the emergency department today for evaluation of bleeding from right ear as well as tinnitus and vertigo. Patient has had symptoms x2 weeks of ear discomfort but noticed progressive tinnitus and some dizziness despite corticosporin otic and amoxicillin that had been given to him for presumed OE and OM. He noticed bleeding from the R ear since a provider at [facility] clinic attempted to remove debris from the ear yesterday.  There is no mastoid ttp to suggest mastoiditis. No significant pain with movement of R pinna nor auditory canal debris to suggest persistent severe OE. CN 7 intact. On my exam it is unclear the exact source of bleeding but considered perforated TM. " His hearing is subjectively diminished in the R ear. Given clinical picture, I suspect a component of conductive hearing loss, although I did not have a tuning fork available in the ER to confirm this. However he does have tinnitus and vertigo, concerning for inner ear process.  Patient has steady gait and otherwise normal neuro exam and with clinical picture his vertigo seems peripheral in nature. I instructed patient to continue using ciprodex suspension drops and take course augmentin. I also wrote a script for meclizine as needed for vertigo. I called the ENT clinical here in our speciality clinic who arranged for pt to have appt with Dr Mcpherson at 1:15pm today. Patient was agreeble to this plan.    Diagnosis:    ICD-10-CM    1. Blood in ear canal, right H92.21    2. Tinnitus of right ear H93.11    3. Vertigo R42      Disposition:   The patient is discharged to home.    Discharge Medications:  Augmentin 875-125mg tablet BID x10 days  Meclizine 25mg q6hr PRN  Continue ciprodex suspension    Scribe Disclosure:  ISIDORO, Julia Neff, am serving as a scribe at 8:49 AM on 8/9/2018 to document services personally performed by Erlin Barnes MD based on my observations and the provider's statements to me.      [facility] EMERGENCY DEPARTMENT       Erlin Barnes MD  08/09/18 4560

## 2018-09-04 ENCOUNTER — MYC REFILL (OUTPATIENT)
Dept: INTERNAL MEDICINE | Facility: CLINIC | Age: 58
End: 2018-09-04

## 2018-09-04 DIAGNOSIS — E11.9 TYPE 2 DIABETES MELLITUS WITHOUT COMPLICATION, WITHOUT LONG-TERM CURRENT USE OF INSULIN (H): Primary | ICD-10-CM

## 2018-09-04 RX ORDER — FEXOFENADINE HCL 180 MG/1
TABLET ORAL
Qty: 90 TABLET | Refills: 3 | Status: CANCELLED | OUTPATIENT
Start: 2018-09-04

## 2018-09-05 RX ORDER — DAPAGLIFLOZIN 10 MG/1
10 TABLET, FILM COATED ORAL DAILY
Qty: 30 TABLET | Refills: 0 | Status: SHIPPED | OUTPATIENT
Start: 2018-09-05 | End: 2018-10-12

## 2018-09-05 NOTE — TELEPHONE ENCOUNTER
Patient is overdue for his appointment, I have filled his medication for 1 month please advise lab work and office visit.  I have ordered the labs advised to get labs done before the office visit

## 2018-09-05 NOTE — TELEPHONE ENCOUNTER
See my chart message. He is asking for Farxiga.     Last OV 7/24/18, 2/19/18.     Lab Results   Component Value Date    A1C 9.1 07/24/2018    A1C 9.0 02/19/2018    A1C 8.0 02/28/2011    A1C 7.7 08/20/2010    A1C 7.7 05/21/2010     Creatinine   Date Value Ref Range Status   02/19/2018 1.02 0.66 - 1.25 mg/dL Final     LDL Cholesterol Calculated   Date Value Ref Range Status   02/19/2018 52 <100 mg/dL Final     Comment:     Desirable:       <100 mg/dl

## 2018-09-05 NOTE — TELEPHONE ENCOUNTER
Message from [employer]:  Original authorizing provider: MD Garrick Ortiz would like a refill of the following medications:  ALLEGRA 180 MG OR TABS [Pete Woodruff MD]    Preferred pharmacy: [facility] [id number] [place] [facility], [place] - [address] [address]    Comment:  I have a prescription for Farxiga 10 mg. It is not on the list but I need a refill. My previous prescription number with [facility] pharmacy in [place] on [address] was [id number]. This was with my old doctor at the [facility]. I have told my nurse abut it when I started going to [facility]. Please refill it at the same location. Please disregard the prescription request for allegra but I could not move off of this screen with out selecting something.

## 2018-10-06 DIAGNOSIS — E11.9 TYPE 2 DIABETES MELLITUS WITHOUT COMPLICATION, WITHOUT LONG-TERM CURRENT USE OF INSULIN (H): ICD-10-CM

## 2018-10-08 NOTE — TELEPHONE ENCOUNTER
"Requested Prescriptions   Pending Prescriptions Disp Refills     FARXIGA 10 MG TABS tablet [Pharmacy Med Name: FARXIGA 10 MG TABLET] 30 tablet 0    Last Written Prescription Date:  09/05/2018  Last Fill Quantity: 30,  # refills: 0   Last office visit: 7/24/2018 with prescribing provider:     Future Office Visit:   Sig: TAKE 1 TABLET BY MOUTH EVERY DAY    Sodium Glucose Co-Transport Inhibitor Agents Failed    10/6/2018  2:39 AM       Failed - Blood pressure less than 140/90 in past 6 months    BP Readings from Last 3 Encounters:   08/09/18 (!) 159/91   07/24/18 118/72   02/19/18 122/72                Passed - Patient has documented LDL within the past 12 mos.    Recent Labs   Lab Test  02/19/18   1006   LDL  52            Passed - Patient has had a Microalbumin in the past 12 mos.    Recent Labs   Lab Test  02/19/18   1006   MICROL  20   UMALCR  32.59*            Passed - Patient has documented A1c within the specified period of time.    If HgbA1C is 8 or greater, it needs to be on file within the past 3 months.  If less than 8, must be on file within the past 6 months.     Recent Labs   Lab Test  07/24/18   1651   A1C  9.1*            Passed - No creatinine >1.4 or GFR <45 within the past 12 mos    Recent Labs   Lab Test  02/19/18   1006   GFRESTIMATED  75   GFRESTBLACK  >90       Recent Labs   Lab Test  02/19/18   1006   CR  1.02            Passed - Patient is age 18 or older       Passed - Patient has documented normal Potassium within the last 12 mos.    Recent Labs   Lab Test  02/19/18   1006   POTASSIUM  4.4            Passed - Recent (6 mo) or future (30 days) visit within the authorizing provider's specialty    Patient had office visit in the last 6 months or has a visit in the next 30 days with authorizing provider or within the authorizing provider's specialty.  See \"Patient Info\" tab in inbasket, or \"Choose Columns\" in Meds & Orders section of the refill encounter.            "

## 2018-10-10 NOTE — TELEPHONE ENCOUNTER
Pt was seen by Dr. Smith for physical. In 07/18. pts A1c was high and Dr. Smith added actos. Pt should f/u A1c . Pl advise pt to get lab only A1c,

## 2018-10-12 ENCOUNTER — MYC REFILL (OUTPATIENT)
Dept: INTERNAL MEDICINE | Facility: CLINIC | Age: 58
End: 2018-10-12

## 2018-10-12 ENCOUNTER — MYC MEDICAL ADVICE (OUTPATIENT)
Dept: INTERNAL MEDICINE | Facility: CLINIC | Age: 58
End: 2018-10-12

## 2018-10-12 DIAGNOSIS — E11.9 TYPE 2 DIABETES MELLITUS WITHOUT COMPLICATION, WITHOUT LONG-TERM CURRENT USE OF INSULIN (H): ICD-10-CM

## 2018-10-15 NOTE — TELEPHONE ENCOUNTER
Patient called. Advised. Transferred to scheduling for lab appointment. Also states he had a flu shot. Documented.

## 2018-10-16 RX ORDER — DAPAGLIFLOZIN 10 MG/1
10 TABLET, FILM COATED ORAL DAILY
Qty: 30 TABLET | Refills: 1 | Status: SHIPPED | OUTPATIENT
Start: 2018-10-16 | End: 2018-12-20

## 2018-10-16 NOTE — TELEPHONE ENCOUNTER
"Farxiga refill request.     Last OV 7/24/18, with Dr Smith   Provider approval needed.         Requested Prescriptions   Pending Prescriptions Disp Refills     dapagliflozin (FARXIGA) 10 MG TABS tablet 30 tablet 0     Sig: Take 1 tablet (10 mg) by mouth daily    Sodium Glucose Co-Transport Inhibitor Agents Failed    10/16/2018 12:38 PM       Failed - Blood pressure less than 140/90 in past 6 months    BP Readings from Last 3 Encounters:   08/09/18 (!) 159/91   07/24/18 118/72   02/19/18 122/72                Passed - Patient has documented LDL within the past 12 mos.    Recent Labs   Lab Test  02/19/18   1006   LDL  52            Passed - Patient has had a Microalbumin in the past 12 mos.    Recent Labs   Lab Test  02/19/18   1006   MICROL  20   UMALCR  32.59*            Passed - Patient has documented A1c within the specified period of time.    If HgbA1C is 8 or greater, it needs to be on file within the past 3 months.  If less than 8, must be on file within the past 6 months.     Recent Labs   Lab Test  07/24/18   1651   A1C  9.1*            Passed - No creatinine >1.4 or GFR <45 within the past 12 mos    Recent Labs   Lab Test  02/19/18   1006   GFRESTIMATED  75   GFRESTBLACK  >90       Recent Labs   Lab Test  02/19/18   1006   CR  1.02            Passed - Patient is age 18 or older       Passed - Patient has documented normal Potassium within the last 12 mos.    Recent Labs   Lab Test  02/19/18   1006   POTASSIUM  4.4            Passed - Recent (6 mo) or future (30 days) visit within the authorizing provider's specialty    Patient had office visit in the last 6 months or has a visit in the next 30 days with authorizing provider or within the authorizing provider's specialty.  See \"Patient Info\" tab in inbasket, or \"Choose Columns\" in Meds & Orders section of the refill encounter.              "

## 2018-10-16 NOTE — TELEPHONE ENCOUNTER
Message from MyChart:  Original authorizing provider: MD Garrick Mcbride would like a refill of the following medications:  dapagliflozin (FARXIGA) 10 MG TABS tablet [Odette Hicks MD]    Preferred pharmacy: [address] [address] [address]    Comment:

## 2018-10-20 DIAGNOSIS — E11.9 TYPE 2 DIABETES MELLITUS WITHOUT COMPLICATION, WITHOUT LONG-TERM CURRENT USE OF INSULIN (H): ICD-10-CM

## 2018-10-20 LAB
ALBUMIN SERPL-MCNC: 3.8 G/DL (ref 3.4–5)
ALP SERPL-CCNC: 63 U/L (ref 40–150)
ALT SERPL W P-5'-P-CCNC: 30 U/L (ref 0–70)
ANION GAP SERPL CALCULATED.3IONS-SCNC: 8 MMOL/L (ref 3–14)
AST SERPL W P-5'-P-CCNC: 34 U/L (ref 0–45)
BILIRUB SERPL-MCNC: 0.4 MG/DL (ref 0.2–1.3)
BUN SERPL-MCNC: 21 MG/DL (ref 7–30)
CALCIUM SERPL-MCNC: 8.7 MG/DL (ref 8.5–10.1)
CHLORIDE SERPL-SCNC: 99 MMOL/L (ref 94–109)
CO2 SERPL-SCNC: 28 MMOL/L (ref 20–32)
CREAT SERPL-MCNC: 1.16 MG/DL (ref 0.66–1.25)
GFR SERPL CREATININE-BSD FRML MDRD: 65 ML/MIN/1.7M2
GLUCOSE SERPL-MCNC: 212 MG/DL (ref 70–99)
HBA1C MFR BLD: 8.5 % (ref 0–5.6)
POTASSIUM SERPL-SCNC: 4.2 MMOL/L (ref 3.4–5.3)
PROT SERPL-MCNC: 7.8 G/DL (ref 6.8–8.8)
SODIUM SERPL-SCNC: 135 MMOL/L (ref 133–144)

## 2018-10-20 PROCEDURE — 83036 HEMOGLOBIN GLYCOSYLATED A1C: CPT | Performed by: INTERNAL MEDICINE

## 2018-10-20 PROCEDURE — 80053 COMPREHEN METABOLIC PANEL: CPT | Performed by: INTERNAL MEDICINE

## 2018-10-20 PROCEDURE — 36415 COLL VENOUS BLD VENIPUNCTURE: CPT | Performed by: INTERNAL MEDICINE

## 2018-10-25 ENCOUNTER — TELEPHONE (OUTPATIENT)
Dept: INTERNAL MEDICINE | Facility: CLINIC | Age: 58
End: 2018-10-25

## 2018-10-25 DIAGNOSIS — E11.9 TYPE 2 DIABETES MELLITUS WITHOUT COMPLICATION, WITHOUT LONG-TERM CURRENT USE OF INSULIN (H): Primary | ICD-10-CM

## 2018-10-25 NOTE — TELEPHONE ENCOUNTER
"Test Strips, Lancing Device, Meter,     Pharmacy Message: Insurance won't cover what he currently has- please send for \"whatever is covered by insurance\"    Last OV 2/19/18  "

## 2018-10-26 RX ORDER — LANCETS
EACH MISCELLANEOUS
Qty: 100 EACH | Refills: 2 | Status: SHIPPED | OUTPATIENT
Start: 2018-10-26

## 2018-10-26 NOTE — TELEPHONE ENCOUNTER
Last OV 7/24/18  Prescription approved per [facility] Refill Protocol.      Lab Results   Component Value Date    A1C 8.5 10/20/2018    A1C 9.1 07/24/2018    A1C 9.0 02/19/2018    A1C 8.0 02/28/2011    A1C 7.7 08/20/2010

## 2018-11-08 ENCOUNTER — OFFICE VISIT (OUTPATIENT)
Dept: INTERNAL MEDICINE | Facility: CLINIC | Age: 58
End: 2018-11-08
Payer: COMMERCIAL

## 2018-11-08 VITALS
SYSTOLIC BLOOD PRESSURE: 120 MMHG | OXYGEN SATURATION: 98 % | BODY MASS INDEX: 40.07 KG/M2 | TEMPERATURE: 98.5 F | HEART RATE: 82 BPM | WEIGHT: 263.5 LBS | DIASTOLIC BLOOD PRESSURE: 80 MMHG

## 2018-11-08 DIAGNOSIS — H60.391 INFECTIVE OTITIS EXTERNA, RIGHT: Primary | ICD-10-CM

## 2018-11-08 PROCEDURE — 99213 OFFICE O/P EST LOW 20 MIN: CPT | Performed by: PHYSICIAN ASSISTANT

## 2018-11-08 RX ORDER — CIPROFLOXACIN AND DEXAMETHASONE 3; 1 MG/ML; MG/ML
4 SUSPENSION/ DROPS AURICULAR (OTIC) 2 TIMES DAILY
Qty: 7.5 ML | Refills: 0 | Status: SHIPPED | OUTPATIENT
Start: 2018-11-08 | End: 2019-02-01

## 2018-11-08 NOTE — PROGRESS NOTES
SUBJECTIVE:   Garrick Espinosa is a 58 year old male who presents to clinic today for the following health issues:      Patient is here for pain in right ear. Pt has had since 11/02/2018. Pt notes he had a similar pain in September when he was treated with two different ear drop and saw an ENT with relief on the second medication. Pt notes no cold symptoms or fever. Pt has a mild headache and feels chilled.     Problem list and histories reviewed & adjusted, as indicated.  Additional history: as documented      Reviewed and updated as needed this visit by clinical staff  Tobacco  Allergies  Meds  Problems       Reviewed and updated as needed this visit by Provider  Allergies  Meds  Problems           OBJECTIVE:     /80  Pulse 82  Temp 98.5  F (36.9  C) (Oral)  Wt 263 lb 8 oz (119.5 kg)  SpO2 98%  BMI 40.07 kg/m2  Body mass index is 40.07 kg/(m^2).  GENERAL: healthy, alert and no distress  HENT: normal cephalic/atraumatic, right ear: drainage in canal and left ear: normal: no effusions, no erythema, normal landmarks    Diagnostic Test Results:  none     ASSESSMENT/PLAN:     1. Infective otitis externa, right  - treatment as below if no improvement, plan to follow up with ENT   - ciprofloxacin-dexamethasone (CIPRODEX) otic suspension; Place 4 drops Into the left ear 2 times daily for 7 days  Dispense: 7.5 mL; Refill: 0    Sophia MYamilex Lomas PA-C  [facility]

## 2018-11-08 NOTE — MR AVS SNAPSHOT
After Visit Summary   11/8/2018    Garrick Espinosa    MRN: [id number]           Patient Information     Date Of Birth          1960        Visit Information        Provider Department      11/8/2018 3:00 PM Sophia Russell PA-C [facility]        Today's Diagnoses     Infective otitis externa, right    -  1       Follow-ups after your visit        Follow-up notes from your care team     Return in about 2 weeks (around 11/22/2018) for ENT Specialist .      Who to contact     If you have questions or need follow up information about today's clinic visit or your schedule please contact [facility] directly at [phone].  Normal or non-critical lab and imaging results will be communicated to you by MyChart, letter or phone within 4 business days after the clinic has received the results. If you do not hear from us within 7 days, please contact the clinic through [email or web address] or phone. If you have a critical or abnormal lab result, we will notify you by phone as soon as possible.  Submit refill requests through My Top 10 or call your pharmacy and they will forward the refill request to us. Please allow 3 business days for your refill to be completed.          Additional Information About Your Visit        MyChart Information     My Top 10 gives you secure access to your electronic health record. If you see a primary care provider, you can also send messages to your care team and make appointments. If you have questions, please call your primary care clinic.  If you do not have a primary care provider, please call [phone] and they will assist you.        Care EveryWhere ID     This is your Care EveryWhere ID. This could be used by other organizations to access your [facility] medical records  [id number]        Your Vitals Were     Pulse Temperature Pulse Oximetry BMI (Body Mass Index)          82 98.5  F (36.9  C) (Oral) 98% 40.07 kg/m2          Blood Pressure from Last 3 Encounters:   11/08/18 120/80   08/09/18 (!) 159/91   07/24/18 118/72    Weight from Last 3 Encounters:   11/08/18 263 lb 8 oz (119.5 kg)   08/09/18 250 lb (113.4 kg)   07/24/18 255 lb (115.7 kg)              Today, you had the following     No orders found for display         Today's Medication Changes          These changes are accurate as of 11/8/18  3:29 PM.  If you have any questions, ask your nurse or doctor.               Start taking these medicines.        Dose/Directions    ciprofloxacin-dexamethasone otic suspension   Commonly known as:  CIPRODEX   Used for:  Infective otitis externa, right   Started by:  Sophia Russell PA-C        Dose:  4 drop   Place 4 drops Into the left ear 2 times daily for 7 days   Quantity:  7.5 mL   Refills:  0         These medicines have changed or have updated prescriptions.        Dose/Directions    ALLEGRA 180 MG tablet   This may have changed:  See the new instructions.   Used for:  Allergies   Generic drug:  fexofenadine        1 daily for allergy   Quantity:  90   Refills:  3            Where to get your medicines      These medications were sent to [facility] [address] [place], [place] - [address]  [address] [address] [facility] [address]     Phone:  [phone]     ciprofloxacin-dexamethasone otic suspension                Primary Care Provider Office Phone # Fax #    Krishnakumari G MD Fabiola [phone]       [address] [facility] [address]        Equal Access to Services     UDAY SOLIS AH: Hadii kevin ku hadasho Soomaali, waaxda luqadaha, qaybta kaalmada adeegyada, waxay ninfa hayserene navarro. So [facility] [phone].    ATENCIÓN: Si habla español, tiene a valente disposición servicios gratuitos de asistencia lingüística. Llame al [phone].    We comply with applicable federal civil rights laws and [place] laws. We do not discriminate on the basis of race, color, national origin, age,  disability, sex, sexual orientation, or gender identity.            Thank you!     Thank you for choosing [facility]  for your care. Our goal is always to provide you with excellent care. Hearing back from our patients is one way we can continue to improve our services. Please take a few minutes to complete the written survey that you may receive in the mail after your visit with us. Thank you!             Your Updated Medication List - Protect others around you: Learn how to safely use, store and throw away your medicines at www.disposemymeds.org.          This list is accurate as of 11/8/18  3:29 PM.  Always use your most recent med list.                   Brand Name Dispense Instructions for use Diagnosis    ALLEGRA 180 MG tablet   Generic drug:  fexofenadine     90    1 daily for allergy    Allergies       ASPIRIN PO      Take 162 mg by mouth daily        blood glucose calibration solution    NO BRAND SPECIFIED    1 Bottle    To accompany: Blood Glucose Monitor Brands: per insurance.    Type 2 diabetes mellitus without complication, without long-term current use of insulin (H)       blood glucose monitoring meter device kit    no brand specified    1 kit    Use to test blood sugar 1-2 times daily or as directed. Preferred Blood Glucose Monitor Brands: per insurance.    Type 2 diabetes mellitus without complication, without long-term current use of insulin (H)       * blood glucose monitoring test strip    no brand specified    3 Box    by In Vitro route 4 times daily.    Type 2 diabetes, HbA1c goal < 7% (H)       * blood glucose monitoring test strip    no brand specified    100 strip    Use to test blood sugar 1-2 times daily or as directed.  Blood Glucose Monitor Brands: per insurance.    Type 2 diabetes mellitus without complication, without long-term current use of insulin (H)       ciprofloxacin-dexamethasone otic suspension    CIPRODEX    7.5 mL    Place 4 drops Into the left ear 2  times daily for 7 days    Infective otitis externa, right       dapagliflozin 10 MG Tabs tablet    FARXIGA    30 tablet    Take 1 tablet (10 mg) by mouth daily    Type 2 diabetes mellitus without complication, without long-term current use of insulin (H)       doxylamine 25 MG Tabs tablet    UNISOM     Take 25 mg by mouth At Bedtime        glipiZIDE 10 MG 24 hr tablet    GLUCOTROL XL    180 tablet    TAKE 1 TABLET (10 MG) BY MOUTH 2 TIMES DAILY    Type 2 diabetes mellitus without complication, without long-term current use of insulin (H)       losartan-hydrochlorothiazide 100-25 MG per tablet    HYZAAR    90 tablet    Take 1 tablet by mouth every morning    Essential hypertension, benign       meclizine 25 MG tablet    ANTIVERT    30 tablet    Take 1 tablet (25 mg) by mouth every 6 hours as needed for dizziness        MELATONIN PO      Take by mouth nightly as needed        metFORMIN 500 MG 24 hr tablet    GLUCOPHAGE-XR    480 tablet    Take 4 tablets (2,000 mg) by mouth daily (with dinner)    Type 2 diabetes mellitus without complication, without long-term current use of insulin (H)       MULTI-DAY vitamin  S Tabs      1 TABLET DAILY    Routine general medical examination at a health care facility       pioglitazone 15 MG tablet    ACTOS    90 tablet    Take 1 tablet (15 mg) by mouth daily    Type 2 diabetes mellitus without complication, without long-term current use of insulin (H)       SIMPLE DIAGNOSTICS LANCING DEV Misc     200 each    1 strip 4 times daily.        simvastatin 40 MG tablet    ZOCOR    90 tablet    TAKE 1 TAB BY MOUTH AT BEDTIME    Type 2 diabetes mellitus without complication, without long-term current use of insulin (H)       thin lancets    NO BRAND SPECIFIED    100 each    Use with lanceting device. To accompany: Blood Glucose Monitor Brands: per insurance.    Type 2 diabetes mellitus without complication, without long-term current use of insulin (H)       triamcinolone 0.1 % cream     KENALOG     APPLY THIN LAYER TO RASH ON SKIN TWICE DAILY FOR NO MORE THAN 10-14 DAYS        TYLENOL 500 MG tablet   Generic drug:  acetaminophen      1 TABLET EVERY 4 HOURS AS NEEDED        VITAMIN B-1 PO      Take by mouth daily        VITAMIN C PO      Take by mouth daily        * Notice:  This list has 2 medication(s) that are the same as other medications prescribed for you. Read the directions carefully, and ask your doctor or other care provider to review them with you.

## 2018-12-16 DIAGNOSIS — E11.9 TYPE 2 DIABETES MELLITUS WITHOUT COMPLICATION, WITHOUT LONG-TERM CURRENT USE OF INSULIN (H): ICD-10-CM

## 2018-12-17 NOTE — TELEPHONE ENCOUNTER
"Requested Prescriptions   Pending Prescriptions Disp Refills     blood glucose monitoring (ONE TOUCH ULTRA 2) meter device kit [Pharmacy Med Name: ONE TOUCH ULTRA 2 GLUCOSE SYS] 1 kit 0    Last Written Prescription Date:  10/26/2018  Last Fill Quantity: 1 Kit,  # refills: 0   Last office visit: 11/8/2018 with prescribing provider:     Future Office Visit:   Sig: USE TO TEST BLOOD SUGAR 1-2 TIMES DAILY OR AS DIRECTED.    Diabetic Supplies Protocol Passed - 12/16/2018 10:04 AM       Passed - Patient is 18 years of age or older       Passed - Recent (6 mo) or future (30 days) visit within the authorizing provider's specialty    Patient had office visit in the last 6 months or has a visit in the next 30 days with authorizing provider.  See \"Patient Info\" tab in inbasket, or \"Choose Columns\" in Meds & Orders section of the refill encounter.            "

## 2018-12-20 DIAGNOSIS — E11.9 TYPE 2 DIABETES MELLITUS WITHOUT COMPLICATION, WITHOUT LONG-TERM CURRENT USE OF INSULIN (H): ICD-10-CM

## 2018-12-20 RX ORDER — BLOOD-GLUCOSE METER
EACH MISCELLANEOUS
Qty: 1 KIT | Refills: 0 | Status: SHIPPED | OUTPATIENT
Start: 2018-12-20

## 2018-12-21 NOTE — TELEPHONE ENCOUNTER
"Requested Prescriptions   Pending Prescriptions Disp Refills     FARXIGA 10 MG TABS tablet [Pharmacy Med Name: FARXIGA 10 MG TABLET]  Last Written Prescription Date: 10/16/2018  Last Fill Quantity: 30 tablet, # Refills: 1  Last Office Visit: 07/24/2018 Luis  Future Office visit:      30 tablet 1     Sig: TAKE 1 TABLET BY MOUTH EVERY DAY    Sodium Glucose Co-Transport Inhibitor Agents Passed - 12/20/2018  6:58 PM       Passed - Blood pressure less than 140/90 in past 6 months    BP Readings from Last 3 Encounters:   11/08/18 120/80   08/09/18 (!) 159/91   07/24/18 118/72                Passed - Patient has documented LDL within the past 12 mos.    Recent Labs   Lab Test 02/19/18  1006   LDL 52            Passed - Patient has had a Microalbumin in the past 15 mos.    Recent Labs   Lab Test 02/19/18  1006   MICROL 20   UMALCR 32.59*            Passed - Patient has documented A1c within the specified period of time.    If HgbA1C is 8 or greater, it needs to be on file within the past 3 months.  If less than 8, must be on file within the past 6 months.     Recent Labs   Lab Test 10/20/18  1026   A1C 8.5*            Passed - No creatinine >1.4 or GFR <45 within the past 12 mos    Recent Labs   Lab Test 10/20/18  1026   GFRESTIMATED 65   GFRESTBLACK 78       Recent Labs   Lab Test 10/20/18  1026   CR 1.16            Passed - Patient is age 18 or older       Passed - Patient has documented normal Potassium within the last 12 mos.    Recent Labs   Lab Test 10/20/18  1026   POTASSIUM 4.2            Passed - Recent (6 mo) or future (30 days) visit within the authorizing provider's specialty    Patient had office visit in the last 6 months or has a visit in the next 30 days with authorizing provider or within the authorizing provider's specialty.  See \"Patient Info\" tab in inbasket, or \"Choose Columns\" in Meds & Orders section of the refill encounter.              "

## 2018-12-24 RX ORDER — DAPAGLIFLOZIN 10 MG/1
TABLET, FILM COATED ORAL
Qty: 30 TABLET | Refills: 0 | Status: SHIPPED | OUTPATIENT
Start: 2018-12-24 | End: 2019-02-01

## 2019-01-03 RX ORDER — DAPAGLIFLOZIN 10 MG/1
TABLET, FILM COATED ORAL
Qty: 90 TABLET | Refills: 0 | OUTPATIENT
Start: 2019-01-03

## 2019-01-04 NOTE — TELEPHONE ENCOUNTER
Prescription was sent by another triage nurse on 12/24. I have attempted to contact this patient several times, twice by phone and once via [employer]. Patient read [employer] but did not schedule. There is also a GI referral pended in this encounter but I do not know who pended it or why so I can not close encounter. Will route to primary care provider as FYI and to see if she knows about GI referral.

## 2019-01-11 NOTE — TELEPHONE ENCOUNTER
Patient scheduled appointment with Dr. Smith for 2/1/19. GI order removed as this can be addressed at appointment if needed.

## 2019-01-24 DIAGNOSIS — E11.9 TYPE 2 DIABETES MELLITUS WITHOUT COMPLICATION, WITHOUT LONG-TERM CURRENT USE OF INSULIN (H): ICD-10-CM

## 2019-01-24 RX ORDER — PIOGLITAZONEHYDROCHLORIDE 15 MG/1
TABLET ORAL
Qty: 90 TABLET | Refills: 0 | Status: SHIPPED | OUTPATIENT
Start: 2019-01-24 | End: 2019-02-01

## 2019-01-24 NOTE — TELEPHONE ENCOUNTER
"Requested Prescriptions   Pending Prescriptions Disp Refills     pioglitazone (ACTOS) 15 MG tablet [Pharmacy Med Name: PIOGLITAZONE HCL 15 MG TABLET] 90 tablet 0     Sig: TAKE 1 TABLET BY MOUTH EVERY DAY    Thiazolidinedione Agents (TZDs)  Failed - 1/24/2019  2:23 AM       Failed - Patient has documented A1c within the specified period of time.    If HgbA1C is 8 or greater, it needs to be on file within the past 3 months.  If less than 8, must be on file within the past 6 months.     Recent Labs   Lab Test 10/20/18  1026   A1C 8.5*            Passed - Blood pressure less than 140/90 in past 6 months    BP Readings from Last 3 Encounters:   11/08/18 120/80   08/09/18 (!) 159/91   07/24/18 118/72                Passed - Patient has documented LDL within the past 12 mos.    Recent Labs   Lab Test 02/19/18  1006   LDL 52            Passed - Patient has a normal ALT within the past 12 mos.    Recent Labs   Lab Test 10/20/18  1026   ALT 30            Passed - Patient has a normal AST within the past 12 mos.     Recent Labs   Lab Test 10/20/18  1026   AST 34            Passed - Patient has had a Microalbumin in the past 12 mos.    Recent Labs   Lab Test 02/19/18  1006   MICROL 20   UMALCR 32.59*            Passed - Diagnosis not CHF       Passed - Medication is active on med list       Passed - Patient is age 18 or older       Passed - Patient has a normal serum Creatinine in the past 12 months    Recent Labs   Lab Test 10/20/18  1026   CR 1.16            Passed - Recent (6 mo) or future (30 days) visit within the authorizing provider's specialty    Patient had office visit in the last 6 months or has a visit in the next 30 days with authorizing provider or within the authorizing provider's specialty.  See \"Patient Info\" tab in inbasket, or \"Choose Columns\" in Meds & Orders section of the refill encounter.            Patient has scheduled a 2/1/19 office/lab appt.  Prescription approved per [facility] Refill Protocol. JESSY Callejas, " TRISTAN

## 2019-02-01 ENCOUNTER — OFFICE VISIT (OUTPATIENT)
Dept: INTERNAL MEDICINE | Facility: CLINIC | Age: 59
End: 2019-02-01
Payer: COMMERCIAL

## 2019-02-01 VITALS
WEIGHT: 264 LBS | HEIGHT: 68 IN | OXYGEN SATURATION: 95 % | BODY MASS INDEX: 40.01 KG/M2 | TEMPERATURE: 98 F | DIASTOLIC BLOOD PRESSURE: 76 MMHG | HEART RATE: 98 BPM | RESPIRATION RATE: 12 BRPM | SYSTOLIC BLOOD PRESSURE: 124 MMHG

## 2019-02-01 DIAGNOSIS — E78.5 HYPERLIPIDEMIA LDL GOAL <100: Primary | ICD-10-CM

## 2019-02-01 DIAGNOSIS — I10 ESSENTIAL HYPERTENSION, BENIGN: ICD-10-CM

## 2019-02-01 DIAGNOSIS — Z12.11 SPECIAL SCREENING FOR MALIGNANT NEOPLASMS, COLON: ICD-10-CM

## 2019-02-01 DIAGNOSIS — E11.9 TYPE 2 DIABETES MELLITUS WITHOUT COMPLICATION, WITHOUT LONG-TERM CURRENT USE OF INSULIN (H): ICD-10-CM

## 2019-02-01 DIAGNOSIS — E66.01 MORBID OBESITY (H): ICD-10-CM

## 2019-02-01 LAB — HBA1C MFR BLD: 8.5 % (ref 0–5.6)

## 2019-02-01 PROCEDURE — 36415 COLL VENOUS BLD VENIPUNCTURE: CPT | Performed by: INTERNAL MEDICINE

## 2019-02-01 PROCEDURE — 80061 LIPID PANEL: CPT | Performed by: INTERNAL MEDICINE

## 2019-02-01 PROCEDURE — 99214 OFFICE O/P EST MOD 30 MIN: CPT | Performed by: INTERNAL MEDICINE

## 2019-02-01 PROCEDURE — 83036 HEMOGLOBIN GLYCOSYLATED A1C: CPT | Performed by: INTERNAL MEDICINE

## 2019-02-01 RX ORDER — LOSARTAN POTASSIUM AND HYDROCHLOROTHIAZIDE 25; 100 MG/1; MG/1
1 TABLET ORAL EVERY MORNING
Qty: 90 TABLET | Refills: 3 | Status: SHIPPED | OUTPATIENT
Start: 2019-02-01 | End: 2019-04-05

## 2019-02-01 RX ORDER — PIOGLITAZONEHYDROCHLORIDE 15 MG/1
15 TABLET ORAL DAILY
Qty: 90 TABLET | Refills: 3 | Status: SHIPPED | OUTPATIENT
Start: 2019-02-01 | End: 2019-04-22

## 2019-02-01 RX ORDER — DAPAGLIFLOZIN 10 MG/1
10 TABLET, FILM COATED ORAL DAILY
Qty: 90 TABLET | Refills: 3 | Status: SHIPPED | OUTPATIENT
Start: 2019-02-01 | End: 2020-02-11

## 2019-02-01 ASSESSMENT — ENCOUNTER SYMPTOMS
NERVOUS/ANXIOUS: 0
COUGH: 0
HEMATURIA: 0
HEMATOCHEZIA: 1
EYE PAIN: 0
CONSTIPATION: 1
ABDOMINAL PAIN: 0
DIZZINESS: 0
FEVER: 0
CHILLS: 0
DIARRHEA: 0

## 2019-02-01 ASSESSMENT — MIFFLIN-ST. JEOR: SCORE: 1992

## 2019-02-01 NOTE — NURSING NOTE
"Vital signs:  Temp: 98  F (36.7  C) Temp src: Oral BP: 124/76 Pulse: 98   Resp: 12 SpO2: 95 %     Height: 172.7 cm (5' 8\") Weight: 119.7 kg (264 lb)  Estimated body mass index is 40.14 kg/m  as calculated from the following:    Height as of this encounter: 1.727 m (5' 8\").    Weight as of this encounter: 119.7 kg (264 lb).          "

## 2019-02-01 NOTE — PROGRESS NOTES
SUBJECTIVE:   Garrick Espinosa is a 58 year old male who presents to clinic today for the following health issues:    Patient is seen for a follow up visit.      Diabetes Follow-up  Has H/O DM. On diet , exercise and oral medications. Blood sugars are not well controlled. No parestesias. No hypoglycemias.    Patient is checking blood sugars: once daily.  Results are as follows:         am - varies- 150-200    Diabetic concerns: None     Symptoms of hypoglycemia (low blood sugar): none     Paresthesias (numbness or burning in feet) or sores: No     Date of last diabetic eye exam: 04/20/18    Diabetes Management Resources    Hyperlipidemia Follow-Up  Has H/O hyperlipidemia. On medical treatment and diet. No side effects. No muscle weakness, myalgias or upset stomach.       Rate your low fat/cholesterol diet?: fair    Taking statin?  Yes, no muscle aches from statin    Other lipid medications/supplements?:  none    Hypertension Follow-up  Has h/o HTN. on medical treatment. BP has been controlled. No side effects from medications. No CP, HA, dizziness. good compliance with medications and low salt diet.      Outpatient blood pressures are not being checked.    Low Salt Diet: no added salt    BP Readings from Last 2 Encounters:   11/08/18 120/80   08/09/18 (!) 159/91     Hemoglobin A1C (%)   Date Value   10/20/2018 8.5 (H)   07/24/2018 9.1 (H)     LDL Cholesterol Calculated (mg/dL)   Date Value   02/19/2018 52   02/28/2011 88       Amount of exercise or physical activity: once a week    Problems taking medications regularly: No    Medication side effects: none    Diet: tries diabetic diest        Problem list and histories reviewed & adjusted, as indicated.  Additional history: as documented    Patient Active Problem List   Diagnosis     Essential hypertension, benign     Hyperlipidemia LDL goal <100     Obesity     Elevated liver enzymes     Psoriasis     Type 2 diabetes mellitus without complication, without long-term  current use of insulin (H)     Morbid obesity (H)     Past Surgical History:   Procedure Laterality Date     C NONSPECIFIC PROCEDURE  12/06    R ankle surgery      NECK SURGERY  2011    hemilaminectomy        Social History     Tobacco Use     Smoking status: Never Smoker     Smokeless tobacco: Never Used   Substance Use Topics     Alcohol use: Yes     Comment: Occ     Family History   Problem Relation Age of Onset     Diabetes Father         Born 1935, also HTN     Neurologic Disorder Mother         Born ~1937, a few small strokes     Cerebrovascular Disease Mother          Current Outpatient Medications   Medication Sig Dispense Refill     ALLEGRA 180 MG OR TABS 1 daily for allergy (Patient taking differently: As needed) 90 3     Ascorbic Acid (VITAMIN C PO) Take by mouth daily       ASPIRIN PO Take 162 mg by mouth daily       dapagliflozin (FARXIGA) 10 MG TABS tablet Take 1 tablet (10 mg) by mouth daily 90 tablet 3     doxylamine (UNISOM) 25 MG TABS tablet Take 25 mg by mouth At Bedtime       glipiZIDE (GLUCOTROL XL) 10 MG 24 hr tablet TAKE 1 TABLET (10 MG) BY MOUTH 2 TIMES DAILY 180 tablet 3     losartan-hydrochlorothiazide (HYZAAR) 100-25 MG tablet Take 1 tablet by mouth every morning 90 tablet 3     MELATONIN PO Take by mouth nightly as needed       metFORMIN (GLUCOPHAGE-XR) 500 MG 24 hr tablet Take 4 tablets (2,000 mg) by mouth daily (with dinner) 480 tablet 3     MULTI-DAY VITAMINS OR TABS 1 TABLET DAILY       pioglitazone (ACTOS) 15 MG tablet Take 1 tablet (15 mg) by mouth daily 90 tablet 3     simvastatin (ZOCOR) 40 MG tablet TAKE 1 TAB BY MOUTH AT BEDTIME 90 tablet 3     Thiamine HCl (VITAMIN B-1 PO) Take by mouth daily       triamcinolone (KENALOG) 0.1 % cream APPLY THIN LAYER TO RASH ON SKIN TWICE DAILY FOR NO MORE THAN 10-14 DAYS  2     TYLENOL EXTRA STRENGTH 500 MG PO TABS 1 TABLET EVERY 4 HOURS AS NEEDED       blood glucose calibration (NO BRAND SPECIFIED) solution To accompany: Blood Glucose Monitor  "Brands: per insurance. 1 Bottle 3     blood glucose monitoring (NO BRAND SPECIFIED) test strip Use to test blood sugar 1-2 times daily or as directed.  Blood Glucose Monitor Brands: per insurance. 100 strip 6     blood glucose monitoring (ONE TOUCH ULTRA 2) meter device kit USE TO TEST BLOOD SUGAR 1-2 TIMES DAILY OR AS DIRECTED. 1 kit 0     Lancet Devices (SIMPLE DIAGNOSTICS LANCING DEV) MISC 1 strip 4 times daily. 200 each 11     thin (NO BRAND SPECIFIED) lancets Use with lanceting device. To accompany: Blood Glucose Monitor Brands: per insurance. 100 each 2       Reviewed and updated as needed this visit by clinical staff       Reviewed and updated as needed this visit by Provider         ROS:  Constitutional, HEENT, cardiovascular, pulmonary, gi and gu systems are negative, except as otherwise noted.    OBJECTIVE:     /76   Pulse 98   Temp 98  F (36.7  C) (Oral)   Resp 12   Ht 1.727 m (5' 8\")   Wt 119.7 kg (264 lb)   SpO2 95%   BMI 40.14 kg/m    Body mass index is 40.14 kg/m .   GENERAL: obese, alert and no distress  EYES: Eyes grossly normal to inspection, PERRL and conjunctivae and sclerae normal  HENT: ear canals and TM's normal, nose and mouth without ulcers or lesions  NECK: no adenopathy, no asymmetry, masses, or scars and thyroid normal to palpation  RESP: lungs clear to auscultation - no rales, rhonchi or wheezes  CV: regular rate and rhythm, normal S1 S2, no S3 or S4, no murmur, click or rub, no peripheral edema and peripheral pulses strong  ABDOMEN: soft, nontender, no hepatosplenomegaly, no masses and bowel sounds normal  MS: no gross musculoskeletal defects noted, no edema  SKIN: no suspicious lesions or rashes  NEURO: Normal strength and tone, mentation intact and speech normal    Diagnostic Test Results:  none     ASSESSMENT/PLAN:     Problem List Items Addressed This Visit     Essential hypertension, benign    Relevant Medications    losartan-hydrochlorothiazide (HYZAAR) 100-25 MG " tablet    Hyperlipidemia LDL goal <100 - Primary    Relevant Orders    Lipid panel reflex to direct LDL Fasting    Type 2 diabetes mellitus without complication, without long-term current use of insulin (H)    Relevant Medications    dapagliflozin (FARXIGA) 10 MG TABS tablet    pioglitazone (ACTOS) 15 MG tablet    Other Relevant Orders    Hemoglobin A1c    DIABETES EDUCATOR REFERRAL (Completed)    Morbid obesity (H)    Relevant Medications    dapagliflozin (FARXIGA) 10 MG TABS tablet    pioglitazone (ACTOS) 15 MG tablet      Other Visit Diagnoses     Special screening for malignant neoplasms, colon        Relevant Orders    GASTROENTEROLOGY ADULT REF PROCEDURE ONLY Pieter Chahal [phone]; [facility]           Assess lab work  Continue diet, exercise, medications   Refer for DM education   Colonoscopy referral     Follow-Up:in 6 months     Ibrahima Smith MD  [facility]    Answers for HPI/ROS submitted by the patient on 2/1/2019   Annual Exam:  Frequency of exercise:: 1 day/week  Getting at least 3 servings of Calcium per day:: Yes  Diet:: Regular (no restrictions)  Taking medications regularly:: Yes  Medication side effects:: None  Bi-annual eye exam:: Yes  Dental care twice a year:: Yes  Sleep apnea or symptoms of sleep apnea:: None  Positive for the following: Blood in stool, constipation  Negative for the following: abdominal pain, Blood in urine, chest pain, chills, congestion, cough, diarrhea, dizziness, ear pain, eye pain, nervous/anxious, fever, frequency, genital sores, headaches, hearing loss, heartburn, arthralgias, joint swelling, peripheral edema, mood changes, myalgias, nausea, dysuria, palpitations, Skin sensation changes, sore throat, urgency, rash, shortness of breath, visual disturbance, weakness  Duration of exercise:: 15-30 minutes

## 2019-02-01 NOTE — LETTER
February 8, 2019      Garrick Espinosa  [address]  [place] [facility] [address]        Dear ,    Multiple attempts had been made to contact you regarding your lab results. Please review your results and recommendations, call the clinic if you have any questions.     Your lab results indicated that your diabetes is not well control. Please try to improve on your diet and exercise regularly. Also increase the Actos to 30 mg daily.     Resulted Orders   Lipid panel reflex to direct LDL Fasting   Result Value Ref Range    Cholesterol 175 <200 mg/dL    Triglycerides 322 (H) <150 mg/dL      Comment:      Borderline high:  150-199 mg/dl  High:             200-499 mg/dl  Very high:       >499 mg/dl  Fasting specimen      HDL Cholesterol 47 >39 mg/dL    LDL Cholesterol Calculated 64 <100 mg/dL      Comment:      Desirable:       <100 mg/dl    Non HDL Cholesterol 128 <130 mg/dL   Hemoglobin A1c   Result Value Ref Range    Hemoglobin A1C 8.5 (H) 0 - 5.6 %      Comment:      Normal <5.7% Prediabetes 5.7-6.4%  Diabetes 6.5% or higher - adopted from ADA   consensus guidelines.  Reviewed: OK with previous         If you have any questions or concerns, please call the clinic at the number listed above.       Sincerely,        Ibrahima Smith MD

## 2019-02-02 LAB
CHOLEST SERPL-MCNC: 175 MG/DL
HDLC SERPL-MCNC: 47 MG/DL
LDLC SERPL CALC-MCNC: 64 MG/DL
NONHDLC SERPL-MCNC: 128 MG/DL
TRIGL SERPL-MCNC: 322 MG/DL

## 2019-03-20 ENCOUNTER — TELEPHONE (OUTPATIENT)
Dept: INTERNAL MEDICINE | Facility: CLINIC | Age: 59
End: 2019-03-20

## 2019-03-20 DIAGNOSIS — I10 ESSENTIAL HYPERTENSION, BENIGN: Primary | ICD-10-CM

## 2019-03-20 RX ORDER — HYDROCHLOROTHIAZIDE 25 MG/1
25 TABLET ORAL DAILY
Qty: 90 TABLET | Refills: 1 | Status: SHIPPED | OUTPATIENT
Start: 2019-03-20 | End: 2019-12-26

## 2019-03-20 RX ORDER — LOSARTAN POTASSIUM 100 MG/1
100 TABLET ORAL DAILY
Qty: 90 TABLET | Refills: 1 | Status: SHIPPED | OUTPATIENT
Start: 2019-03-20 | End: 2019-12-12

## 2019-03-20 NOTE — TELEPHONE ENCOUNTER
Fax from pharmacy.   Please send new Rxs for Losartan and hydrochlorothiazide separately.   The combination 100-25 is not available.    Last OV 2/1/19    BP Readings from Last 3 Encounters:   02/01/19 124/76   11/08/18 120/80   08/09/18 (!) 159/91     Potassium   Date Value Ref Range Status   10/20/2018 4.2 3.4 - 5.3 mmol/L Final     Creatinine   Date Value Ref Range Status   10/20/2018 1.16 0.66 - 1.25 mg/dL Final

## 2019-03-29 ENCOUNTER — HOSPITAL ENCOUNTER (OUTPATIENT)
Facility: CLINIC | Age: 59
Discharge: HOME | End: 2019-03-29
Attending: INTERNAL MEDICINE | Admitting: INTERNAL MEDICINE
Payer: COMMERCIAL

## 2019-03-29 VITALS
HEART RATE: 78 BPM | DIASTOLIC BLOOD PRESSURE: 87 MMHG | SYSTOLIC BLOOD PRESSURE: 128 MMHG | HEIGHT: 68 IN | RESPIRATION RATE: 16 BRPM | OXYGEN SATURATION: 95 % | BODY MASS INDEX: 39.4 KG/M2 | WEIGHT: 260 LBS

## 2019-03-29 LAB
COLONOSCOPY: NORMAL
GLUCOSE BLDC GLUCOMTR-MCNC: 173 MG/DL (ref 70–99)

## 2019-03-29 PROCEDURE — G0500 MOD SEDAT ENDO SERVICE >5YRS: HCPCS | Performed by: INTERNAL MEDICINE

## 2019-03-29 PROCEDURE — 88305 TISSUE EXAM BY PATHOLOGIST: CPT | Mod: 26 | Performed by: INTERNAL MEDICINE

## 2019-03-29 PROCEDURE — 45380 COLONOSCOPY AND BIOPSY: CPT | Performed by: INTERNAL MEDICINE

## 2019-03-29 PROCEDURE — 25000128 H RX IP 250 OP 636: Performed by: INTERNAL MEDICINE

## 2019-03-29 PROCEDURE — 88305 TISSUE EXAM BY PATHOLOGIST: CPT | Performed by: INTERNAL MEDICINE

## 2019-03-29 PROCEDURE — 82962 GLUCOSE BLOOD TEST: CPT

## 2019-03-29 RX ORDER — ONDANSETRON 4 MG/1
4 TABLET, ORALLY DISINTEGRATING ORAL EVERY 6 HOURS PRN
Status: DISCONTINUED | OUTPATIENT
Start: 2019-03-29 | End: 2019-03-29 | Stop reason: HOSPADM

## 2019-03-29 RX ORDER — ONDANSETRON 2 MG/ML
4 INJECTION INTRAMUSCULAR; INTRAVENOUS EVERY 6 HOURS PRN
Status: DISCONTINUED | OUTPATIENT
Start: 2019-03-29 | End: 2019-03-29 | Stop reason: HOSPADM

## 2019-03-29 RX ORDER — NALOXONE HYDROCHLORIDE 0.4 MG/ML
.1-.4 INJECTION, SOLUTION INTRAMUSCULAR; INTRAVENOUS; SUBCUTANEOUS
Status: DISCONTINUED | OUTPATIENT
Start: 2019-03-29 | End: 2019-03-29 | Stop reason: HOSPADM

## 2019-03-29 RX ORDER — LIDOCAINE 40 MG/G
CREAM TOPICAL
Status: DISCONTINUED | OUTPATIENT
Start: 2019-03-29 | End: 2019-03-29 | Stop reason: HOSPADM

## 2019-03-29 RX ORDER — FENTANYL CITRATE 50 UG/ML
INJECTION, SOLUTION INTRAMUSCULAR; INTRAVENOUS PRN
Status: DISCONTINUED | OUTPATIENT
Start: 2019-03-29 | End: 2019-03-29 | Stop reason: HOSPADM

## 2019-03-29 RX ORDER — ONDANSETRON 2 MG/ML
4 INJECTION INTRAMUSCULAR; INTRAVENOUS
Status: DISCONTINUED | OUTPATIENT
Start: 2019-03-29 | End: 2019-03-29 | Stop reason: HOSPADM

## 2019-03-29 RX ORDER — FLUMAZENIL 0.1 MG/ML
0.2 INJECTION, SOLUTION INTRAVENOUS
Status: DISCONTINUED | OUTPATIENT
Start: 2019-03-29 | End: 2019-03-29 | Stop reason: HOSPADM

## 2019-03-29 ASSESSMENT — MIFFLIN-ST. JEOR: SCORE: 1973.85

## 2019-03-29 NOTE — LETTER
March 13, 2019      Garrick Espinosa  [address]  [facility] [address]        Dear Garrick,     Thank you for choosing [facility]. You are scheduled for the following service:     Date:  3/29/2019  Friday             Procedure:  COLONOSCOPY  Doctor:        Dr. Elias   Arrival Time:  8:30 am  *Check in at Emergency/Endoscopy desk*  Procedure Time:  9:00 am      Location:   [facility]        Endoscopy Department, First Floor (Enter through ER Doors) *        [address] [place], [place] [address]      [phone] or [phone] () to reschedule      MIRALAX -GATORADE  PREP  Colonoscopy is the most accurate test to detect colon polyps and colon cancer; and the only test where polyps can be removed. During this procedure, a doctor examines the lining of your large intestine and rectum through a flexible tube.   Transportation  Arrange for a ride for the day of your procedure with a responsible adult.  A taxi ride is not an option unless you are accompanied by a responsible adult. If you fail to arrange transportation with a responsible adult, your procedure will be cancelled and rescheduled.    Purchase the  following supplies at your local pharmacy:  - 2 (two) bisacodyl tablets: each tablet contains 5 mg.  (Dulcolax  laxative NOT Dulcolax  stool softener)   - 1 (one) 8.3 oz bottle of Polyethylene Glycol (PEG) 3350 Powder   (MiraLAX , Smooth LAX , ClearLAX  or equivalent)  - 64 oz Gatorade    Regular Gatorade, Gatorade G2 , Powerade , Powerade Zero  or Pedialyte  is acceptable. Red colored flavors are not allowed; all other colors (yellow, green, orange, purple and blue) are okay. It is also okay to buy two 2.12 oz packets of powdered Gatorade that can be mixed with water to a total volume of 64 oz of liquid.  - 1 (one) 10 oz bottle of Magnesium Citrate (Red colored flavors are not allowed)  It is also okay for you to use a 0.5 oz package of  powdered magnesium citrate (17 g) mixed with 10 oz of water.      PREPARATION FOR COLONOSCOPY    7 days before:    Discontinue fiber supplements and medications containing iron. This includes Metamucil  and Fibercon ; and multivitamins with iron.    3 days before:    Begin a low-fiber diet. A low-fiber diet helps making the cleanout more effective.     Examples of a low-fiber diet include (but are not limited to): white bread, white rice, pasta, crackers, fish, chicken, eggs, ground beef, creamy peanut butter, cooked/steamed/boiled vegetables, canned fruit, bananas, melons, milk, plain yogurt cheese, salad dressing and other condiments.     The following are not allowed on a low-fiber diet: seeds, nuts, popcorn, bran, whole wheat, corn, quinoa, raw fruits and vegetables, berries and dried fruit, beans and lentils.    For additional details on low-fiber diet, please refer to the table on the last page.    2 days before:    Continue the low-fiber diet.     Drink at least 8 glasses of water throughout the day.     Stop eating solid foods at 11:45 pm.    1 day before:    In the morning: begin a clear liquid diet (liquids you can see through).     Examples of a clear liquid diet include: water, clear broth or bouillon, Gatorade, Pedialyte or Powerade, carbonated and non-carbonated soft drinks (Sprite , 7-Up , ginger ale), strained fruit juices without pulp (apple, white grape, white cranberry), Jell-O  and popsicles.     The following are not allowed on a clear liquid diet: red liquids, alcoholic beverages, dairy products (milk, creamer, and yogurt), protein shakes, creamy broths, juice with pulp and chewing tobacco.    At noon: take 2 (two) bisacodyl tablets     At 4 (and no later than 6pm): start drinking the Miralax-Gatorade preparation (8.3 oz of Miralax mixed with 64 oz of Gatorade in a large pitcher). Drink 1(one) 8 oz glass every 15 minutes thereafter, until the mixture is gone.    COLON CLEANSING TIPS: drink  adequate amounts of fluids before and after your colon cleansing to prevent dehydration. Stay near a toilet because you will have diarrhea. Even if you are sitting on the toilet, continue to drink the cleansing solution every 15 minutes. If you feel nauseous or vomit, rinse your mouth with water, take a 15 to 30-minute-break and then continue drinking the solution. You will be uncomfortable until the stool has flushed from your colon (in about 2 to 4 hours). You may feel chilled.    Day of your procedure  You may take all of your morning medications including blood pressure medications, blood thinners (if you have not been instructed to stop these by our office), methadone, anti-seizure medications with sips of water 3 hours prior to your procedure or earlier. Do not take insulin or vitamins prior to your procedure. Continue the clear liquid diet.       4 hours prior: drink 10 oz of magnesium citrate. It may be easier to drink it with a straw.    STOP consuming all liquids after that.     Do not take anything by mouth during this time.     Allow extra time to travel to your procedure as you may need to stop and use a restroom along the way.    You are ready for the procedure, if you followed all instructions and your stool is no longer formed, but clear or yellow liquid. If you are unsure whether your colon is clean, please call our office at [phone] before you leave for your appointment.    Bring the following to your procedure:  - Insurance Card/Photo ID.   - List of current medications including over-the-counter medications and supplements.   - Your rescue inhaler if you currently use one to control asthma.      Canceling or rescheduling your appointment:   If you must cancel or reschedule your appointment, please call [phone] as soon as possible.      COLONOSCOPY PRE-PROCEDURE CHECKLIST    If you have diabetes, ask your regular doctor for diet and medication restrictions.  If you take an anticoagulant  or anti-platelet medication (such as Coumadin , Lovenox , Pradaxa , Xarelto , Eliquis , etc.), please call your primary doctor for advice on holding this medication.  If you take aspirin you may continue to do so.  If you are or may be pregnant, please discuss the risks and benefits of this procedure with your doctor.        What happens during a colonoscopy?    Plan to spend up to two hours, starting at registration time, at the endoscopy center the day of your procedure. The colonoscopy takes an average of 15 to 30 minutes. Recovery time is about 30 minutes.      Before the exam:    You will change into a gown.    Your medical history and medication list will be reviewed with you, unless that has been done over the phone prior to the procedure.     A nurse will insert an intravenous (IV) line into your hand or arm.    The doctor will meet with you and will give you a consent form to sign.  During the exam:     Medicine will be given through the IV line to help you relax.     Your heart rate and oxygen levels will be monitored. If your blood pressure is low, you may be given fluids through the IV line.     The doctor will insert a flexible hollow tube, called a colonoscope, into your rectum. The scope will be advanced slowly through the large intestine (colon).    You may have a feeling of fullness or pressure.     If an abnormal tissue or a polyp is found, the doctor may remove it through the endoscope for closer examination, or biopsy. Tissue removal is painless    After the exam:           Any tissue samples removed during the exam will be sent to a lab for evaluation. It may take 5-7 working days for you to be notified of the results.     A nurse will provide you with complete discharge instructions before you leave the endoscopy center. Be sure to ask the nurse for specific instructions if you take blood thinners such as Aspirin, Coumadin or Plavix.     The doctor will prepare a full report for you and for the  physician who referred you for the procedure.     Your doctor will talk with you about the initial results of your exam.      Medication given during the exam will prohibit you from driving for the rest of the day.     Following the exam, you may resume your normal diet. Your first meal should be light, no greasy foods. Avoid alcohol until the next day.     You may resume your regular activities the day after the procedure.         LOW-FIBER DIET    Foods RECOMMENDED Foods to AVOID   Breads, Cereal, Rice and Pasta:   White bread, rolls, biscuits, croissant and zeeshan toast.   Waffles, [employer] toast and pancakes.   White rice, noodles, pasta, macaroni and peeled cooked potatoes.   Plain crackers and saltines.   Cooked cereals: farina, cream of rice.   Cold cereals: Puffed Rice , Rice Krispies , Corn Flakes  and Special K    Breads, Cereal, Rice and Pasta:   Breads or rolls with nuts, seeds or fruit.   Whole wheat, pumpernickel, rye breads and cornbread.   Potatoes with skin, brown or wild rice, and kasha (buckwheat).     Vegetables:   Tender cooked and canned vegetables without seeds: carrots, asparagus tips, green or wax beans, pumpkin, spinach, lima beans. Vegetables:   Raw or steamed vegetables.   Vegetables with seeds.   Sauerkraut.   Winter squash, peas, broccoli, Brussel sprouts, cabbage, onions, cauliflower, baked beans, peas and corn.   Fruits:   Strained fruit juice.   Canned fruit, except pineapple.   Ripe bananas and melon. Fruits:   Prunes and prune juice.   Raw fruits.   Dried fruits: figs, dates and raisins.   Milk/Dairy:   Milk: plain or flavored.   Yogurt, custard and ice cream.   Cheese and cottage cheese Milk/Dairy:     Meat and other proteins:   ground, well-cooked tender beef, lamb, ham, veal, pork, fish, poultry and organ meats.   Eggs.   Peanut butter without nuts. Meat and other proteins:   Tough, fibrous meats with gristle.   Dry beans, peas and lentils.   Peanut butter with nuts.   Tofu.    Fats, Snack, Sweets, Condiments and Beverages:   Margarine, butter, oils, mayonnaise, sour cream and salad dressing, plain gravy.   Sugar, hard candy, clear jelly, honey and syrup.   Spices, cooked herbs, bouillon, broth and soups made with allowed vegetable, ketchup and mustard.   Coffee, tea and carbonated drinks.   Plain cakes, cookies and pretzels.   Gelatin, plain puddings, custard, ice cream, sherbet and popsicles. Fats, Snack, Sweets, Condiments and Beverages:   Nuts, seeds and coconut.   Jam, marmalade and preserves.   Pickles, olives, relish and horseradish.   All desserts containing nuts, seeds, dried fruit and coconut; or made from whole grains or bran.   Candy made with nuts or seeds.   Popcorn.                     DIRECTIONS TO THE ENDOSCOPY DEPARTMENT     From the north ([facility])  Take [address] South, exit on [address] [address]. Get into the left hand manny, turn left (east), go one-half mile to [address] and turn left. Go north to the first stoplight, take a right on [address] and follow it to the Emergency entrance.    From the south ([facility])  Take [address]N to the [address] split and exit on [address] [address]. On [address] [address], turn left (west) to [address]. Turn right (north) on [address]. Go north to the first stoplight, take a right on [address] and follow it to the Emergency entrance.    From the east via 35E ([facility])  Take [address] south to [address] [address] exit. Turn right on [address] . Go west to [address]. Turn right (north) on [address]. Go to the first stoplight, take a right and follow on [address] to the Emergency entrance.    From the east via [address] ([facility])  Take [address] to [address]. Turn left (south) on [address] to [address]. Turn left (east) on [address] and follow it to the Emergency entrance.    From the west via [address] ([place])  Take [address] east  to [address]. Turn right (south) on [address] to [address]. Turn left (east) on [address] and follow it to the Emergency entrance.

## 2019-03-29 NOTE — PROGRESS NOTES
[facility]  Pre-Endoscopy History and Physical     Garrick Espinosa MRN# [id number]   YOB: 1960 Age: 58 year old   Today's Date: 03/29/2019    Date of Procedure: 3/29/2019  Primary care provider: Odette Hicks  Type of Endoscopy: Colonoscopy  Reason for Procedure: Screen    Type of Anesthesia Anticipated: Moderate (conscious) sedation    HPI:    Garrick is a 58 year old male who will be undergoing the above procedure.      A history and physical has been performed. The patient's medications and allergies have been reviewed. The risks and benefits of the procedure and the sedation options and risks were discussed with the patient.  All questions were answered and informed consent was obtained.      Allergies   Allergen Reactions     Accupril      tablets     Ace Inhibitors      cough     Cats      Dogs      Seasonal Allergies         No current facility-administered medications for this encounter.        Patient Active Problem List   Diagnosis     Essential hypertension, benign     Hyperlipidemia LDL goal <100     Obesity     Elevated liver enzymes     Psoriasis     Type 2 diabetes mellitus without complication, without long-term current use of insulin (H)     Morbid obesity (H)        Past Medical History:   Diagnosis Date     Allergic rhinitis, cause unspecified      Diabetes mellitus (H) 6/09      Essential hypertension, benign      Other and unspecified hyperlipidemia      Psoriasis      Sciatica     R leg radiation; remote;; resolved         Past Surgical History:   Procedure Laterality Date     C NONSPECIFIC PROCEDURE  12/06    R ankle surgery      NECK SURGERY  2011    hemilaminectomy        Social History     Tobacco Use     Smoking status: Never Smoker     Smokeless tobacco: Never Used   Substance Use Topics     Alcohol use: Yes     Comment: Occ       Family History   Problem Relation Age of Onset     Diabetes Father         Born 1935, also HTN     Neurologic Disorder  "Mother         Born ~1937, a few small strokes     Cerebrovascular Disease Mother          PHYSICAL EXAM:   There were no vitals taken for this visit. Estimated body mass index is 40.14 kg/m  as calculated from the following:    Height as of 2/1/19: 1.727 m (5' 8\").    Weight as of 2/1/19: 119.7 kg (264 lb).   RESP: lungs clear to auscultation - no rales, rhonchi or wheezes  CV: normal S1 S2, no S3 or S4    IMPRESSION   ASA Class 3 - Severe systemic disease, but not incapacitating  Mallampati Score: 2      Jeff Elias MD                "

## 2019-03-29 NOTE — DISCHARGE INSTRUCTIONS
Understanding Colon and Rectal Polyps     The colon has a smooth lining composed of millions of cells.     The colon (also called the large intestine) is a muscular tube that forms the last part of the digestive tract. It absorbs water and stores food waste. The colon is about 4 to 6 feet long. The rectum is the last 6 inches of the colon. The colon and rectum have a smooth lining composed of millions of cells. Changes in these cells can lead to growths in the colon that can become cancerous and should be removed.     When the Colon Lining Changes  Changes that occur in the cells that line the colon or rectum can lead to growths called polyps. Over a period of years, polyps can turn cancerous. Removing polyps early may prevent cancer from ever forming.      Polyps  Polyps are fleshy clumps of tissue that form on the lining of the colon or rectum. Small polyps are usually benign (not cancerous). However, over time, cells in a polyp can change and become cancerous. The larger a polyp grows, the more likely this is to happen. Also, certain types of polyps known as adenomatous polyps are considered premalignant. This means that they will almost always become cancerous if they re not removed.          Cancer  Almost all colorectal cancers start when polyp cells begin growing abnormally. As a cancerous tumor grows, it may involve more and more of the colon or rectum. In time, cancer can also grow beyond the colon or rectum and spread to nearby organs or to glands called lymph nodes. The cells can also travel to other parts of the body. This is known as metastasis. The earlier a cancerous tumor is removed, the better the chance of preventing its spread.        9039-7833 [facility], [address] [place] [address], [place] [address]. All rights reserved. This information is not intended as a substitute for professional medical care. Always follow your healthcare professional's instructions.  Eating a High-Fiber Diet  Fiber is  what gives strength and structure to plants. Most grains, beans, vegetables, and fruits contain fiber. Foods rich in fiber are often low in calories and fat, and they fill you up more. They may also reduce your risks for certain health problems. To find out the amount of fiber in canned, packaged, or frozen foods, read the  Nutrition Facts  label. It tells you how much fiber is in a serving.      Types of Fiber and Their Benefits  There are two types of fiber: insoluble and soluble. They both aid digestion and help you maintain a healthy weight.  Insoluble fiber: This is found in whole grains, cereals, certain fruits and vegetables (such as apple skin, corn, and carrots). Insoluble fiber may prevent constipation and reduce the risk of certain types of cancer.   Soluble fiber: This type of fiber is in oats, beans, and certain fruits and vegetables (such as strawberries and peas). Soluble fiber can reduce cholesterol (which may help lower the risk of heart disease), and helps control blood sugar levels.  Look for High-Fiber Foods  Whole-grain breads and cereals: Try to eat 6-8 ounces a day. Include wheat and oat bran cereals, whole-wheat muffins or toast, and corn tortillas in your meals.  Fruits: Try to eat 2 cups a day. Apples, oranges, strawberries, pears, and bananas are good sources. (Note: Fruit juice is low in fiber.)  Vegetables: Try to eat 3 cups a day. Add asparagus, carrots, broccoli, peas, and corn to your meals.  Legumes (beans): One cup of cooked lentils gives you over 15 grams of fiber. Try navy beans, lentils, and chickpeas.  Seeds:  A small handful of seeds gives you about 3 grams of fiber. Try sunflower seeds.    Keep Track of Your Fiber  A healthy diet includes 31 grams of fiber a day if you have a 2,000-calorie diet. Keep track of how much fiber you eat. Start by reading food labels. Then eat a variety of foods high in fiber. Ask your doctor about supplemental fiber products.            [id number]  [facility], [address] [place] [address], [place] [address]. All rights reserved. This information is not intended as a substitute for professional medical care. Always follow your healthcare professional's instructions.    HEMORRHOIDS, External      A hemorrhoid is a local swelling of the veins around the rectum. These most often occur from repeated forceful straining during bowel movements or heavy lifting. It may also occur in the last few months of pregnancy. A hemorrhoid feels like a soft lump. It may itch from time to time. When it is inflamed it becomes hard and very painful.    HOME CARE:  1. SITZ BATHS: Sit in a tub filled with about 6 inches of hot water. Allow the water to run in order to keep it hot for a total of 10-15 minutes. Repeat this three times a day until pain is relieved.  2. Keep your stools soft to avoid the need to strain when having a bowel movement. Unless another medicine was prescribed, try the following:  IF YOU ARE CONSTIPATED: You may use over-the-counter laxatives such as MILK OF MAGNESIA (mild acting) or, DULCOLAX (if stronger action is needed).  IF YOU ARE NOT CONSTIPATED but stools are hard, try taking Colace (docusate sodium) which is a stool softener. This will soften stools without producing diarrhea. Drinking extra fluids may also help.  3. The use of creams applied to the hemorrhoid itself, such as ANUSOL or PREPARATION H, will be helpful to reduce pain and itching, and speed healing.    PREVENTION:  Avoid straining on the toilet by keeping stools soft. Increasing FIBER in your diet (fruits, cereals, vegetables and grains) will promote healthy bowel movement. If this is not working, you may use METAMUCIL and similar products. These are over-the-counter fiber supplements. You must drink extra fluids when taking these to avoid constipation.  FOLLOW UP with your doctor if you do not begin to respond to the above treatment within the next few days.    GET PROMPT MEDICAL ATTENTION  if any of the following occur:      Large amount of rectal bleeding (more than 1 cup of blood in 24 hours)    Increasing rectal pain or rectal pain that continues for more than three days of treatment    Weakness, dizziness or fainting    Vomiting blood (red or black color)          [id number] [facility], [address] [place] [address]. All rights reserved. This information is not intended as a substitute for professional medical care. Always follow your healthcare professional's instructions.

## 2019-04-01 LAB — COPATH REPORT: NORMAL

## 2019-04-05 ENCOUNTER — OFFICE VISIT (OUTPATIENT)
Dept: URGENT CARE | Facility: URGENT CARE | Age: 59
End: 2019-04-05
Payer: COMMERCIAL

## 2019-04-05 VITALS
TEMPERATURE: 98.1 F | DIASTOLIC BLOOD PRESSURE: 76 MMHG | RESPIRATION RATE: 16 BRPM | BODY MASS INDEX: 40.9 KG/M2 | SYSTOLIC BLOOD PRESSURE: 122 MMHG | WEIGHT: 269.9 LBS | HEART RATE: 81 BPM | OXYGEN SATURATION: 95 % | HEIGHT: 68 IN

## 2019-04-05 DIAGNOSIS — M10.071 ACUTE IDIOPATHIC GOUT OF RIGHT ANKLE: Primary | ICD-10-CM

## 2019-04-05 DIAGNOSIS — E11.9 TYPE 2 DIABETES MELLITUS WITHOUT COMPLICATION, WITHOUT LONG-TERM CURRENT USE OF INSULIN (H): ICD-10-CM

## 2019-04-05 PROCEDURE — 99214 OFFICE O/P EST MOD 30 MIN: CPT | Performed by: PHYSICIAN ASSISTANT

## 2019-04-05 RX ORDER — INDOMETHACIN 50 MG/1
50 CAPSULE ORAL
Qty: 30 CAPSULE | Refills: 1 | Status: SHIPPED | OUTPATIENT
Start: 2019-04-05 | End: 2019-04-05

## 2019-04-05 RX ORDER — INDOMETHACIN 50 MG/1
50 CAPSULE ORAL
Qty: 30 CAPSULE | Refills: 0 | Status: SHIPPED | OUTPATIENT
Start: 2019-04-05 | End: 2020-03-21

## 2019-04-05 ASSESSMENT — MIFFLIN-ST. JEOR: SCORE: 2018.76

## 2019-04-05 NOTE — PATIENT INSTRUCTIONS
Patient Education     Gout    Gout is an inflammation of a joint due to a build-up of gout crystals in the joint fluid. This occurs when there is an excess of uric acid (a normal waste product) in the body. Uric acid builds up in the body when the kidneys are unable to filter enough of it from the blood. This may occur with age. It is also associated with kidney disease. Gout occurs more often in people with obesity, diabetes, high blood pressure, or high levels of fats in the blood. It may run in families. Gout tends to come and go. A flare up of gout is called an attack. Drinking alcohol or eating certain foods (such as shellfish or foods with additives such as high-fructose corn syrup) may increase uric acid levels in the blood and cause a gout attack.  During a gout attack, the affected joint may become a hot, red, swollen and painful. If you have had one attack of gout, you are likely to have another. An attack of gout can be treated with medicine. If these attacks become frequent, a daily medicine may be prescribed to help the kidneys remove uric acid from the body.  Home care  During a gout attack:    Rest painful joints. If gout affects the joints of your foot or leg, you may want to use crutches for the first few days to keep from bearing weight on the affected joint.    When sitting or lying down, raise the painful joint to a level higher than your heart.    Apply an ice pack (ice cubes in a plastic bag wrapped in a thin towel) over the injured area for 20 minutes every 1 to 2 hours the first day for pain relief. Continue this 3 to 4 times a day for swelling and pain.    Avoid alcohol and foods listed below (see Preventing attacks) during a gout attack. Drink extra fluid to help flush the uric acid through your kidneys.    If you were prescribed a medicine to treat gout, take it as your healthcare provider has instructed. Don't skip doses.    Take anti-inflammatory medicine as directed.     If pain  medicines have been prescribed, take them exactly as directed.    Preventing attacks    Minimize or avoid alcohol use. Excess alcohol intake can cause a gout attack.    Limit these foods and beverages:  ? Organ meats, such as kidneys and liver  ? Certain seafoods (anchovies, sardines, shrimp, scallops, herring, mackerel)  ? Wild game, meat extracts and meat gravies  ? Foods and beverages sweetened with high-fructose corn syrup, such as sodas    Eat a healthy diet including low-fat and nonfat dairy, whole grains, and vegetables.    If you are overweight, talk to your healthcare provider about a weight reduction plan. Avoid fasting or extreme low calorie diets (less than 900 calories per day). This will increase uric acid levels in the body.    If you have diabetes or high blood pressure, work with your doctor to manage these conditions.    Protect the joint from injury. Trauma can trigger a gout attack.  Follow-up care  Follow up with your healthcare provider, or as advised.   When to seek medical advice  Call your healthcare provider if you have any of the following:    Fever over 100.4 F (38. C) with worsening joint pain    Increasing redness around the joint    Pain developing in another joint    Repeated vomiting, abdominal pain, or blood in the vomit or stool (black or red color)  Date Last Reviewed: 3/1/2017    [id number] [facility]. [address] [place] [address], [place] [address]. All rights reserved. This information is not intended as a substitute for professional medical care. Always follow your healthcare professional's instructions.

## 2019-04-05 NOTE — PROGRESS NOTES
indoSUBJECTIVE:  Chief Complaint   Patient presents with     Urgent Care     right ankle pain, has had past surgery on this ankle patient states that it feels like gout      Garrick Espinosa is a 58 year old male who presents with a chief complaint of right ankle pain and swelling.  Symptoms began 1 day(s) ago, are moderate and sudden onset and still present  Context:  Injury:No known trauma  Pain exacerbated by walking, flexion/extension and weight-bearing Relieved by nothing.  He treated it initially with Ibuprofen.   Patient has a history of gout, but not in many years.  He denies having fever, chills, trauma to the area or recent skin infection    Past Medical History:   Diagnosis Date     Allergic rhinitis, cause unspecified      Diabetes mellitus (H) 6/09      Essential hypertension, benign      Other and unspecified hyperlipidemia      Psoriasis      Sciatica     R leg radiation; remote;; resolved      Current Outpatient Medications   Medication Sig Dispense Refill     ASPIRIN PO Take 162 mg by mouth daily       blood glucose calibration (NO BRAND SPECIFIED) solution To accompany: Blood Glucose Monitor Brands: per insurance. 1 Bottle 3     blood glucose monitoring (NO BRAND SPECIFIED) test strip Use to test blood sugar 1-2 times daily or as directed.  Blood Glucose Monitor Brands: per insurance. 100 strip 6     blood glucose monitoring (ONE TOUCH ULTRA 2) meter device kit USE TO TEST BLOOD SUGAR 1-2 TIMES DAILY OR AS DIRECTED. 1 kit 0     dapagliflozin (FARXIGA) 10 MG TABS tablet Take 1 tablet (10 mg) by mouth daily 90 tablet 3     doxylamine (UNISOM) 25 MG TABS tablet Take 25 mg by mouth At Bedtime       glipiZIDE (GLUCOTROL XL) 10 MG 24 hr tablet TAKE 1 TABLET (10 MG) BY MOUTH 2 TIMES DAILY 180 tablet 3     hydrochlorothiazide (HYDRODIURIL) 25 MG tablet Take 1 tablet (25 mg) by mouth daily 90 tablet 1     Lancet Devices (SIMPLE DIAGNOSTICS LANCING DEV) MISC 1 strip 4 times daily. 200 each 11     losartan  "(COZAAR) 100 MG tablet Take 1 tablet (100 mg) by mouth daily 90 tablet 1     MELATONIN PO Take by mouth nightly as needed       metFORMIN (GLUCOPHAGE-XR) 500 MG 24 hr tablet Take 4 tablets (2,000 mg) by mouth daily (with dinner) 480 tablet 3     MULTI-DAY VITAMINS OR TABS 1 TABLET DAILY       pioglitazone (ACTOS) 15 MG tablet Take 1 tablet (15 mg) by mouth daily 90 tablet 3     simvastatin (ZOCOR) 40 MG tablet TAKE 1 TAB BY MOUTH AT BEDTIME 90 tablet 3     thin (NO BRAND SPECIFIED) lancets Use with lanceting device. To accompany: Blood Glucose Monitor Brands: per insurance. 100 each 2     triamcinolone (KENALOG) 0.1 % cream APPLY THIN LAYER TO RASH ON SKIN TWICE DAILY FOR NO MORE THAN 10-14 DAYS  2     TYLENOL EXTRA STRENGTH 500 MG PO TABS 1 TABLET EVERY 4 HOURS AS NEEDED       losartan-hydrochlorothiazide (HYZAAR) 100-25 MG tablet Take 1 tablet by mouth every morning (Patient not taking: Reported on 4/5/2019) 90 tablet 3     Thiamine HCl (VITAMIN B-1 PO) Take by mouth daily       Social History     Tobacco Use     Smoking status: Never Smoker     Smokeless tobacco: Never Used   Substance Use Topics     Alcohol use: Yes     Comment: Occ       ROS:  Review of systems negative except as stated above.    EXAM:   /76   Pulse 81   Temp 98.1  F (36.7  C) (Oral)   Resp 16   Ht 1.727 m (5' 8\")   Wt 122.4 kg (269 lb 14.4 oz)   SpO2 95%   BMI 41.04 kg/m    M/S Exam: Right ankle has erythema and swelling. Decreased ROM 2/2 pain.  GENERAL APPEARANCE: healthy, alert and no distress  EXTREMITIES: peripheral pulses normal  SKIN: no suspicious lesions or rashes  NEURO: Normal strength and tone, sensory exam grossly normal, mentation intact and speech normal      ASSESSMENT / PLAN:  1. Acute idiopathic gout of right ankle  DDx: Septic joint, ankle fracture, tendonitis  Patient has used indomethacin in the past with relief of sxs. Will stay away from prednisone for now, to avoid glucose elevation  Elevate foot, can use " ice for symptomatic management  Should improve in the next couple of days   - indomethacin (INDOCIN) 50 MG capsule; Take 1 capsule (50 mg) by mouth 3 times daily (with meals)  Dispense: 30 capsule; Refill: 0    I have discussed the patient's diagnosis and my plan of treatment with the patient. We went over any labs or imaging. Patient is aware to come back in with worsening symptoms or if no relief despite treatment plan.  Patient verbalizes understanding. All questions were addressed and answered.   Kinjal Brown PA-C

## 2019-04-22 ENCOUNTER — MYC MEDICAL ADVICE (OUTPATIENT)
Dept: INTERNAL MEDICINE | Facility: CLINIC | Age: 59
End: 2019-04-22

## 2019-04-22 ENCOUNTER — MYC REFILL (OUTPATIENT)
Dept: INTERNAL MEDICINE | Facility: CLINIC | Age: 59
End: 2019-04-22

## 2019-04-22 DIAGNOSIS — E11.9 TYPE 2 DIABETES MELLITUS WITHOUT COMPLICATION, WITHOUT LONG-TERM CURRENT USE OF INSULIN (H): Primary | ICD-10-CM

## 2019-04-22 DIAGNOSIS — Z11.59 ENCOUNTER FOR HEPATITIS C SCREENING TEST FOR LOW RISK PATIENT: ICD-10-CM

## 2019-04-22 DIAGNOSIS — I10 ESSENTIAL HYPERTENSION, BENIGN: Primary | ICD-10-CM

## 2019-04-22 DIAGNOSIS — E11.9 TYPE 2 DIABETES MELLITUS WITHOUT COMPLICATION, WITHOUT LONG-TERM CURRENT USE OF INSULIN (H): ICD-10-CM

## 2019-04-23 RX ORDER — PIOGLITAZONEHYDROCHLORIDE 15 MG/1
30 TABLET ORAL DAILY
Qty: 180 TABLET | Refills: 0 | Status: SHIPPED | OUTPATIENT
Start: 2019-04-23 | End: 2019-07-05

## 2019-04-23 NOTE — TELEPHONE ENCOUNTER
Actos refill request.   Prescription approved per [facility] Protocol.  Also placed lab orders for May    Per Dr Smith on 2/4/19:.   Not well controlled diabetes. Try to improve diet and exercise, increase Actos to 30 mg, recheck in 3 months.     Last OV 2/1/19     Lab Results   Component Value Date    A1C 8.5 02/01/2019    A1C 8.5 10/20/2018    A1C 9.1 07/24/2018    A1C 9.0 02/19/2018    A1C 8.0 02/28/2011     Creatinine   Date Value Ref Range Status   10/20/2018 1.16 0.66 - 1.25 mg/dL Final     BP Readings from Last 3 Encounters:   04/05/19 122/76   03/29/19 128/87   02/01/19 124/76

## 2019-05-07 RX ORDER — PIOGLITAZONEHYDROCHLORIDE 30 MG/1
30 TABLET ORAL DAILY
Qty: 90 TABLET | Refills: 0 | Status: SHIPPED | OUTPATIENT
Start: 2019-05-07 | End: 2019-08-15

## 2019-05-07 NOTE — TELEPHONE ENCOUNTER
Pt sent my chart message. Insurance will not pay for 2- 15 mg of actos.   Sending 30- mg instead.

## 2019-07-05 ENCOUNTER — OFFICE VISIT (OUTPATIENT)
Dept: INTERNAL MEDICINE | Facility: CLINIC | Age: 59
End: 2019-07-05
Payer: COMMERCIAL

## 2019-07-05 VITALS
HEIGHT: 68 IN | BODY MASS INDEX: 39.86 KG/M2 | HEART RATE: 88 BPM | WEIGHT: 263 LBS | SYSTOLIC BLOOD PRESSURE: 128 MMHG | TEMPERATURE: 98.1 F | OXYGEN SATURATION: 96 % | DIASTOLIC BLOOD PRESSURE: 70 MMHG | RESPIRATION RATE: 20 BRPM

## 2019-07-05 DIAGNOSIS — E66.01 MORBID OBESITY (H): ICD-10-CM

## 2019-07-05 DIAGNOSIS — E11.9 TYPE 2 DIABETES MELLITUS WITHOUT COMPLICATION, WITHOUT LONG-TERM CURRENT USE OF INSULIN (H): Primary | ICD-10-CM

## 2019-07-05 DIAGNOSIS — L20.82 FLEXURAL ECZEMA: ICD-10-CM

## 2019-07-05 DIAGNOSIS — I10 ESSENTIAL HYPERTENSION, BENIGN: ICD-10-CM

## 2019-07-05 DIAGNOSIS — E78.5 HYPERLIPIDEMIA LDL GOAL <100: ICD-10-CM

## 2019-07-05 LAB
ALBUMIN SERPL-MCNC: 3.7 G/DL (ref 3.4–5)
ALP SERPL-CCNC: 61 U/L (ref 40–150)
ALT SERPL W P-5'-P-CCNC: 30 U/L (ref 0–70)
ANION GAP SERPL CALCULATED.3IONS-SCNC: 10 MMOL/L (ref 3–14)
AST SERPL W P-5'-P-CCNC: 27 U/L (ref 0–45)
BILIRUB SERPL-MCNC: 0.2 MG/DL (ref 0.2–1.3)
BUN SERPL-MCNC: 24 MG/DL (ref 7–30)
CALCIUM SERPL-MCNC: 8.7 MG/DL (ref 8.5–10.1)
CHLORIDE SERPL-SCNC: 106 MMOL/L (ref 94–109)
CO2 SERPL-SCNC: 24 MMOL/L (ref 20–32)
CREAT SERPL-MCNC: 0.97 MG/DL (ref 0.66–1.25)
CREAT UR-MCNC: 74 MG/DL
GFR SERPL CREATININE-BSD FRML MDRD: 85 ML/MIN/{1.73_M2}
GLUCOSE SERPL-MCNC: 135 MG/DL (ref 70–99)
HBA1C MFR BLD: 8.5 % (ref 0–5.6)
MICROALBUMIN UR-MCNC: 7 MG/L
MICROALBUMIN/CREAT UR: 9.24 MG/G CR (ref 0–17)
POTASSIUM SERPL-SCNC: 4.1 MMOL/L (ref 3.4–5.3)
PROT SERPL-MCNC: 7.4 G/DL (ref 6.8–8.8)
SODIUM SERPL-SCNC: 140 MMOL/L (ref 133–144)
TSH SERPL DL<=0.005 MIU/L-ACNC: 2.74 MU/L (ref 0.4–4)

## 2019-07-05 PROCEDURE — 36415 COLL VENOUS BLD VENIPUNCTURE: CPT | Performed by: INTERNAL MEDICINE

## 2019-07-05 PROCEDURE — 82043 UR ALBUMIN QUANTITATIVE: CPT | Performed by: INTERNAL MEDICINE

## 2019-07-05 PROCEDURE — 84443 ASSAY THYROID STIM HORMONE: CPT | Performed by: INTERNAL MEDICINE

## 2019-07-05 PROCEDURE — 83036 HEMOGLOBIN GLYCOSYLATED A1C: CPT | Performed by: INTERNAL MEDICINE

## 2019-07-05 PROCEDURE — 99214 OFFICE O/P EST MOD 30 MIN: CPT | Performed by: INTERNAL MEDICINE

## 2019-07-05 PROCEDURE — 80053 COMPREHEN METABOLIC PANEL: CPT | Performed by: INTERNAL MEDICINE

## 2019-07-05 RX ORDER — TRIAMCINOLONE ACETONIDE 1 MG/G
CREAM TOPICAL
Qty: 80 G | Refills: 3 | Status: SHIPPED | OUTPATIENT
Start: 2019-07-05

## 2019-07-05 ASSESSMENT — MIFFLIN-ST. JEOR: SCORE: 1987.46

## 2019-07-05 NOTE — PROGRESS NOTES
Subjective     Garrick Espinosa is a 58 year old male who presents to clinic today for the following health issues:    HPI   Diabetes Follow-up  Has H/O DM. On diet , exercise and oral medications. Blood sugars are not well controlled. No parestesias. No hypoglycemias.      How often are you checking your blood sugar? One time daily    What time of day are you checking your blood sugars (select all that apply)?  Before meals    Have you had any blood sugars above 200?  No    Have you had any blood sugars below 70?  Yes , occasionally    What symptoms do you notice when your blood sugar is low?  None    What concerns do you have today about your diabetes? Concerned about poor eating scheduling     Do you have any of these symptoms? (Select all that apply)  Weight gain ( fluctuates through summer, then loose weight again)     Have you had a diabetic eye exam in the last 12 months? No, last in 04/2018    Diabetes Management Resources    Hyperlipidemia Follow-Up  Has H/O hyperlipidemia. On medical treatment and diet. No side effects. No muscle weakness, myalgias or upset stomach.       Are you having any of the following symptoms? (Select all that apply)  Shortness of breath and Increased sweating or nausea with activity    Are you regularly taking any medication or supplement to lower your cholesterol?   Yes- Simvastatin    Are you having muscle aches or other side effects that you think could be caused by your cholesterol lowering medication?  No    Hypertension Follow-up  Has h/o HTN. on medical treatment. BP has been controlled. No side effects from medications. No CP, HA, dizziness. good compliance with medications and low salt diet.      Do you check your blood pressure regularly outside of the clinic? No     Are you following a low salt diet? Yes    Are your blood pressures ever more than 140 on the top number (systolic) OR more   than 90 on the bottom number (diastolic), for example 140/90? No    BP Readings from  Last 2 Encounters:   04/05/19 122/76   03/29/19 128/87     Hemoglobin A1C (%)   Date Value   02/01/2019 8.5 (H)   10/20/2018 8.5 (H)     LDL Cholesterol Calculated (mg/dL)   Date Value   02/01/2019 64   02/19/2018 52       Amount of exercise or physical activity: 3 days a week    Problems taking medications regularly: No    Medication side effects: none    Diet: low fat/cholesterol, diabetic and carbohydrate counting      PROBLEMS TO ADD ON...  Has h/o obesity, does not exercise regularly. No weight change.     Patient Active Problem List   Diagnosis     Essential hypertension, benign     Hyperlipidemia LDL goal <100     Obesity     Elevated liver enzymes     Psoriasis     Type 2 diabetes mellitus without complication, without long-term current use of insulin (H)     Morbid obesity (H)     Past Surgical History:   Procedure Laterality Date     C NONSPECIFIC PROCEDURE  12/06    R ankle surgery      COLONOSCOPY N/A 3/29/2019    Procedure: COMBINED COLONOSCOPY, SINGLE OR MULTIPLE BIOPSY/POLYPECTOMY BY BIOPSY polypectomy of colon polyps by cold jumbo biopsies;  Surgeon: Jeff Elias MD;  Location:  GI     NECK SURGERY  2011    hemilaminectomy        Social History     Tobacco Use     Smoking status: Never Smoker     Smokeless tobacco: Never Used   Substance Use Topics     Alcohol use: Yes     Comment: Occ     Family History   Problem Relation Age of Onset     Diabetes Father         Born 1935, also HTN     Neurologic Disorder Mother         Born ~1937, a few small strokes     Cerebrovascular Disease Mother          Current Outpatient Medications   Medication Sig Dispense Refill     ASPIRIN PO Take 162 mg by mouth daily       dapagliflozin (FARXIGA) 10 MG TABS tablet Take 1 tablet (10 mg) by mouth daily 90 tablet 3     doxylamine (UNISOM) 25 MG TABS tablet Take 25 mg by mouth At Bedtime       glipiZIDE (GLUCOTROL XL) 10 MG 24 hr tablet TAKE 1 TABLET (10 MG) BY MOUTH 2 TIMES DAILY 180 tablet 3      hydrochlorothiazide (HYDRODIURIL) 25 MG tablet Take 1 tablet (25 mg) by mouth daily 90 tablet 1     indomethacin (INDOCIN) 50 MG capsule Take 1 capsule (50 mg) by mouth 3 times daily (with meals) 30 capsule 0     losartan (COZAAR) 100 MG tablet Take 1 tablet (100 mg) by mouth daily 90 tablet 1     MELATONIN PO Take by mouth nightly as needed       metFORMIN (GLUCOPHAGE-XR) 500 MG 24 hr tablet Take 4 tablets (2,000 mg) by mouth daily (with dinner) 480 tablet 3     MULTI-DAY VITAMINS OR TABS 1 TABLET DAILY       pioglitazone (ACTOS) 30 MG tablet Take 1 tablet (30 mg) by mouth daily 90 tablet 0     simvastatin (ZOCOR) 40 MG tablet TAKE 1 TAB BY MOUTH AT BEDTIME 90 tablet 3     Thiamine HCl (VITAMIN B-1 PO) Take by mouth daily       triamcinolone (KENALOG) 0.1 % external cream APPLY THIN LAYER TO RASH ON SKIN TWICE DAILY FOR NO MORE THAN 10-14 DAYS 80 g 3     TYLENOL EXTRA STRENGTH 500 MG PO TABS 1 TABLET EVERY 4 HOURS AS NEEDED       blood glucose calibration (NO BRAND SPECIFIED) solution To accompany: Blood Glucose Monitor Brands: per insurance. 1 Bottle 3     blood glucose monitoring (NO BRAND SPECIFIED) test strip Use to test blood sugar 1-2 times daily or as directed.  Blood Glucose Monitor Brands: per insurance. 100 strip 6     blood glucose monitoring (ONE TOUCH ULTRA 2) meter device kit USE TO TEST BLOOD SUGAR 1-2 TIMES DAILY OR AS DIRECTED. 1 kit 0     Lancet Devices (SIMPLE DIAGNOSTICS LANCING DEV) MISC 1 strip 4 times daily. 200 each 11     thin (NO BRAND SPECIFIED) lancets Use with lanceting device. To accompany: Blood Glucose Monitor Brands: per insurance. 100 each 2         Reviewed and updated as needed this visit by Provider         Review of Systems   ROS COMP: Constitutional, HEENT, cardiovascular, pulmonary, GI, , musculoskeletal, neuro, skin, endocrine and psych systems are negative, except as otherwise noted.      Objective    Pulse 88   Temp 98.1  F (36.7  C) (Oral)   Resp 20   Ht 1.727 m (5'  "8\")   Wt 119.3 kg (263 lb)   SpO2 96%   BMI 39.99 kg/m    Body mass index is 39.99 kg/m .  Physical Exam   GENERAL: obese, alert and no distress  NECK: no adenopathy, no asymmetry, masses, or scars and thyroid normal to palpation  RESP: lungs clear to auscultation - no rales, rhonchi or wheezes  CV: regular rate and rhythm, normal S1 S2, no S3 or S4, no murmur, click or rub, no peripheral edema and peripheral pulses strong  ABDOMEN: soft,obese, nontender, no hepatosplenomegaly, no masses and bowel sounds normal  MS: no gross musculoskeletal defects noted, no edema    Diagnostic Test Results:  Labs reviewed in Epic        Assessment & Plan   Problem List Items Addressed This Visit     Essential hypertension, benign    Hyperlipidemia LDL goal <100    Type 2 diabetes mellitus without complication, without long-term current use of insulin (H) - Primary    Relevant Orders    Comprehensive metabolic panel    Hemoglobin A1c    TSH with free T4 reflex    Albumin Random Urine Quantitative with Creat Ratio    Morbid obesity (H)      Other Visit Diagnoses     Flexural eczema        Relevant Medications    triamcinolone (KENALOG) 0.1 % external cream             BMI:   Estimated body mass index is 39.99 kg/m  as calculated from the following:    Height as of this encounter: 1.727 m (5' 8\").    Weight as of this encounter: 119.3 kg (263 lb).   Weight management plan: Discussed healthy diet and exercise guidelines        See Patient Instructions  Return in about 6 months (around 1/5/2020) for Physical Exam.    Ibrahima Smith MD  [facility]        "

## 2019-07-05 NOTE — NURSING NOTE
"Vital signs:  Temp: 98.1  F (36.7  C) Temp src: Oral BP: 128/70 Pulse: 88   Resp: 20 SpO2: 96 %     Height: 172.7 cm (5' 8\") Weight: 119.3 kg (263 lb)  Estimated body mass index is 39.99 kg/m  as calculated from the following:    Height as of this encounter: 1.727 m (5' 8\").    Weight as of this encounter: 119.3 kg (263 lb).          "

## 2019-07-05 NOTE — LETTER
July 9, 2019      Garrick Espinosa  [address]  [facility] [address]        Dear ,    Your lab results came back within acceptable limits except your diabetes is not under control. Recommendations are to improve your diet and exercise daily. Please follow up in 3 months.    Resulted Orders   Comprehensive metabolic panel   Result Value Ref Range    Sodium 140 133 - 144 mmol/L    Potassium 4.1 3.4 - 5.3 mmol/L    Chloride 106 94 - 109 mmol/L    Carbon Dioxide 24 20 - 32 mmol/L    Anion Gap 10 3 - 14 mmol/L    Glucose 135 (H) 70 - 99 mg/dL      Comment:      Fasting specimen    Urea Nitrogen 24 7 - 30 mg/dL    Creatinine 0.97 0.66 - 1.25 mg/dL    GFR Estimate 85 >60 mL/min/[1.73_m2]      Comment:      Non  GFR Calc  Starting 12/18/2018, serum creatinine based estimated GFR (eGFR) will be   calculated using the Chronic Kidney Disease Epidemiology Collaboration   (CKD-EPI) equation.      GFR Estimate If Black >90 >60 mL/min/[1.73_m2]      Comment:       GFR Calc  Starting 12/18/2018, serum creatinine based estimated GFR (eGFR) will be   calculated using the Chronic Kidney Disease Epidemiology Collaboration   (CKD-EPI) equation.      Calcium 8.7 8.5 - 10.1 mg/dL    Bilirubin Total 0.2 0.2 - 1.3 mg/dL    Albumin 3.7 3.4 - 5.0 g/dL    Protein Total 7.4 6.8 - 8.8 g/dL    Alkaline Phosphatase 61 40 - 150 U/L    ALT 30 0 - 70 U/L    AST 27 0 - 45 U/L   Hemoglobin A1c   Result Value Ref Range    Hemoglobin A1C 8.5 (H) 0 - 5.6 %      Comment:      Normal <5.7% Prediabetes 5.7-6.4%  Diabetes 6.5% or higher - adopted from ADA   consensus guidelines.     TSH with free T4 reflex   Result Value Ref Range    TSH 2.74 0.40 - 4.00 mU/L   Albumin Random Urine Quantitative with Creat Ratio   Result Value Ref Range    Creatinine Urine 74 mg/dL    Albumin Urine mg/L 7 mg/L    Albumin Urine mg/g Cr 9.24 0 - 17 mg/g Cr       If you have any questions or concerns, please call the clinic at the  number listed above.       Sincerely,        Ibrahima Smith MD

## 2019-07-22 DIAGNOSIS — E11.9 TYPE 2 DIABETES MELLITUS WITHOUT COMPLICATION, WITHOUT LONG-TERM CURRENT USE OF INSULIN (H): ICD-10-CM

## 2019-07-22 RX ORDER — GLIPIZIDE 10 MG/1
TABLET, FILM COATED, EXTENDED RELEASE ORAL
Qty: 180 TABLET | Refills: 1 | Status: SHIPPED | OUTPATIENT
Start: 2019-07-22 | End: 2020-01-27

## 2019-07-22 RX ORDER — SIMVASTATIN 40 MG
TABLET ORAL
Qty: 90 TABLET | Refills: 1 | Status: SHIPPED | OUTPATIENT
Start: 2019-07-22 | End: 2020-01-27

## 2019-07-22 NOTE — TELEPHONE ENCOUNTER
"Requested Prescriptions   Pending Prescriptions Disp Refills     simvastatin (ZOCOR) 40 MG tablet [Pharmacy Med Name: SIMVASTATIN 40 MG TABLET] 90 tablet 2     Sig: TAKE 1 TABLET BY MOUTH EVERYDAY AT BEDTIME   Last Written Prescription Date:  07/24/2018  Last Fill Quantity: 90,  # refills: 03   Last office visit: 7/5/2019 with prescribing provider:     Future Office Visit:      Statins Protocol Passed - 7/22/2019  1:52 AM        Passed - LDL on file in past 12 months     Recent Labs   Lab Test 02/01/19  0910   LDL 64             Passed - No abnormal creatine kinase in past 12 months     No lab results found.             Passed - Recent (12 mo) or future (30 days) visit within the authorizing provider's specialty     Patient had office visit in the last 12 months or has a visit in the next 30 days with authorizing provider or within the authorizing provider's specialty.  See \"Patient Info\" tab in inbasket, or \"Choose Columns\" in Meds & Orders section of the refill encounter.              Passed - Medication is active on med list        Passed - Patient is age 18 or older        glipiZIDE (GLUCOTROL XL) 10 MG 24 hr tablet [Pharmacy Med Name: GLIPIZIDE ER 10 MG TABLET] 180 tablet 2     Sig: TAKE 1 TABLET BY MOUTH TWICE A DAY   Last Written Prescription Date:  07/24/2018  Last Fill Quantity: 180,  # refills: 03   Last office visit: 7/5/2019 with prescribing provider:     Future Office Visit:      Sulfonylurea Agents Passed - 7/22/2019  1:52 AM        Passed - Blood pressure less than 140/90 in past 6 months     BP Readings from Last 3 Encounters:   07/05/19 128/70   04/05/19 122/76   03/29/19 128/87                 Passed - Patient has documented LDL within the past 12 mos.     Recent Labs   Lab Test 02/01/19  0910   LDL 64             Passed - Patient has had a Microalbumin in the past 15 mos.     Recent Labs   Lab Test 07/05/19  1019   MICROL 7   UMALCR 9.24             Passed - Patient has documented A1c within the " "specified period of time.     If HgbA1C is 8 or greater, it needs to be on file within the past 3 months.  If less than 8, must be on file within the past 6 months.     Recent Labs   Lab Test 07/05/19  1018   A1C 8.5*             Passed - Medication is active on med list        Passed - Patient is age 18 or older        Passed - Patient has a recent creatinine (normal) within the past 12 mos.     Recent Labs   Lab Test 07/05/19  1018   CR 0.97             Passed - Recent (6 mo) or future (30 days) visit within the authorizing provider's specialty     Patient had office visit in the last 6 months or has a visit in the next 30 days with authorizing provider or within the authorizing provider's specialty.  See \"Patient Info\" tab in inbasket, or \"Choose Columns\" in Meds & Orders section of the refill encounter.            "

## 2019-08-15 ENCOUNTER — MYC REFILL (OUTPATIENT)
Dept: INTERNAL MEDICINE | Facility: CLINIC | Age: 59
End: 2019-08-15

## 2019-08-15 DIAGNOSIS — E11.9 TYPE 2 DIABETES MELLITUS WITHOUT COMPLICATION, WITHOUT LONG-TERM CURRENT USE OF INSULIN (H): ICD-10-CM

## 2019-08-16 RX ORDER — PIOGLITAZONEHYDROCHLORIDE 30 MG/1
30 TABLET ORAL DAILY
Qty: 90 TABLET | Refills: 1 | Status: SHIPPED | OUTPATIENT
Start: 2019-08-16 | End: 2020-02-11

## 2019-08-16 NOTE — TELEPHONE ENCOUNTER
"Requested Prescriptions   Pending Prescriptions Disp Refills     pioglitazone (ACTOS) 30 MG tablet 90 tablet 0     Sig: Take 1 tablet (30 mg) by mouth daily       Thiazolidinedione Agents (TZDs)  Passed - 8/15/2019  5:58 PM        Passed - Blood pressure less than 140/90 in past 6 months     BP Readings from Last 3 Encounters:   07/05/19 128/70   04/05/19 122/76   03/29/19 128/87                 Passed - Patient has documented LDL within the past 12 mos.     Recent Labs   Lab Test 02/01/19  0910   LDL 64             Passed - Patient has a normal ALT within the past 12 mos.     Recent Labs   Lab Test 07/05/19  1018   ALT 30             Passed - Patient has a normal AST within the past 12 mos.      Recent Labs   Lab Test 07/05/19  1018   AST 27             Passed - Patient has had a Microalbumin in the past 12 mos.     Recent Labs   Lab Test 07/05/19  1019   MICROL 7   UMALCR 9.24             Passed - Patient has documented A1c within the specified period of time.     If HgbA1C is 8 or greater, it needs to be on file within the past 3 months.  If less than 8, must be on file within the past 6 months.     Recent Labs   Lab Test 07/05/19  1018   A1C 8.5*             Passed - Diagnosis not CHF        Passed - Medication is active on med list        Passed - Patient is age 18 or older        Passed - Patient has a normal serum Creatinine in the past 12 months     Recent Labs   Lab Test 07/05/19  1018   CR 0.97             Passed - Recent (6 mo) or future (30 days) visit within the authorizing provider's specialty     Patient had office visit in the last 6 months or has a visit in the next 30 days with authorizing provider or within the authorizing provider's specialty.  See \"Patient Info\" tab in inbasket, or \"Choose Columns\" in Meds & Orders section of the refill encounter.            Prescription approved per [facility] Refill Protocol.    "

## 2019-09-12 DIAGNOSIS — E11.9 TYPE 2 DIABETES MELLITUS WITHOUT COMPLICATION, WITHOUT LONG-TERM CURRENT USE OF INSULIN (H): ICD-10-CM

## 2019-09-12 NOTE — TELEPHONE ENCOUNTER
"Requested Prescriptions   Pending Prescriptions Disp Refills     metFORMIN (GLUCOPHAGE-XR) 500 MG 24 hr tablet [Pharmacy Med Name: METFORMIN HCL  MG TABLET] 360 tablet 5     Sig: TAKE 4 TABLETS BY MOUTH EVERY DAY WITH DINNER   Last Written Prescription Date:  07/24/2018  Last Fill Quantity: 480,  # refills: 03   Last office visit: 7/5/2019 with prescribing provider:     Future Office Visit:      Biguanide Agents Passed - 9/12/2019  2:28 AM        Passed - Blood pressure less than 140/90 in past 6 months     BP Readings from Last 3 Encounters:   07/05/19 128/70   04/05/19 122/76   03/29/19 128/87                 Passed - Patient has documented LDL within the past 12 mos.     Recent Labs   Lab Test 02/01/19  0910   LDL 64             Passed - Patient has had a Microalbumin in the past 15 mos.     Recent Labs   Lab Test 07/05/19  1019   MICROL 7   UMALCR 9.24             Passed - Patient is age 10 or older        Passed - Patient has documented A1c within the specified period of time.     If HgbA1C is 8 or greater, it needs to be on file within the past 3 months.  If less than 8, must be on file within the past 6 months.     Recent Labs   Lab Test 07/05/19  1018   A1C 8.5*             Passed - Patient's CR is NOT>1.4 OR Patient's EGFR is NOT<45 within past 12 mos.     Recent Labs   Lab Test 07/05/19  1018   GFRESTIMATED 85   GFRESTBLACK >90       Recent Labs   Lab Test 07/05/19  1018   CR 0.97             Passed - Patient does NOT have a diagnosis of CHF.        Passed - Medication is active on med list        Passed - Recent (6 mo) or future (30 days) visit within the authorizing provider's specialty     Patient had office visit in the last 6 months or has a visit in the next 30 days with authorizing provider or within the authorizing provider's specialty.  See \"Patient Info\" tab in inbasket, or \"Choose Columns\" in Meds & Orders section of the refill encounter.            "

## 2019-09-13 RX ORDER — METFORMIN HCL 500 MG
TABLET, EXTENDED RELEASE 24 HR ORAL
Qty: 360 TABLET | Refills: 1 | Status: SHIPPED | OUTPATIENT
Start: 2019-09-13 | End: 2020-02-25

## 2019-10-25 ENCOUNTER — TRANSFERRED RECORDS (OUTPATIENT)
Dept: HEALTH INFORMATION MANAGEMENT | Facility: CLINIC | Age: 59
End: 2019-10-25

## 2019-11-07 ENCOUNTER — HEALTH MAINTENANCE LETTER (OUTPATIENT)
Age: 59
End: 2019-11-07

## 2019-12-12 DIAGNOSIS — I10 ESSENTIAL HYPERTENSION, BENIGN: ICD-10-CM

## 2019-12-12 RX ORDER — LOSARTAN POTASSIUM 100 MG/1
TABLET ORAL
Qty: 90 TABLET | Refills: 1 | Status: SHIPPED | OUTPATIENT
Start: 2019-12-12 | End: 2020-09-18

## 2019-12-12 NOTE — TELEPHONE ENCOUNTER
"Requested Prescriptions   Pending Prescriptions Disp Refills     losartan (COZAAR) 100 MG tablet [Pharmacy Med Name: LOSARTAN POTASSIUM 100 MG TAB] 90 tablet 1     Sig: TAKE 1 TABLET BY MOUTH EVERY DAY   Last Written Prescription Date:  03/20/2019  Last Fill Quantity: 90,  # refills: 01   Last office visit: 7/5/2019 with prescribing provider:     Future Office Visit:      Angiotensin-II Receptors Passed - 12/12/2019  5:03 AM        Passed - Last blood pressure under 140/90 in past 12 months     BP Readings from Last 3 Encounters:   07/05/19 128/70   04/05/19 122/76   03/29/19 128/87                 Passed - Recent (12 mo) or future (30 days) visit within the authorizing provider's specialty     Patient has had an office visit with the authorizing provider or a provider within the authorizing providers department within the previous 12 mos or has a future within next 30 days. See \"Patient Info\" tab in inbasket, or \"Choose Columns\" in Meds & Orders section of the refill encounter.              Passed - Medication is active on med list        Passed - Patient is age 18 or older        Passed - Normal serum creatinine on file in past 12 months     Recent Labs   Lab Test 07/05/19  1018   CR 0.97             Passed - Normal serum potassium on file in past 12 months     Recent Labs   Lab Test 07/05/19  1018   POTASSIUM 4.1                    "

## 2019-12-12 NOTE — TELEPHONE ENCOUNTER
Prescription approved per [facility], [facility] or [facility] refill protocol.  Karlie LOCKE - Registered Nurse  [facility]  Ac[facility]

## 2019-12-24 DIAGNOSIS — I10 ESSENTIAL HYPERTENSION, BENIGN: ICD-10-CM

## 2019-12-24 NOTE — TELEPHONE ENCOUNTER
"Requested Prescriptions   Pending Prescriptions Disp Refills     hydrochlorothiazide (HYDRODIURIL) 25 MG tablet [Pharmacy Med Name:   HYDROCHLOROTHIAZIDE 25 MG TAB]    Last Written Prescription Date:  3/20/19  Last Fill Quantity: 90,  # refills: 1   Last office visit: 7/5/2019 with prescribing provider:  Luis  Future Office Visit:     90 tablet 1     Sig: TAKE 1 TABLET BY MOUTH EVERY DAY       Diuretics (Including Combos) Protocol Passed - 12/24/2019  3:00 AM        Passed - Blood pressure under 140/90 in past 12 months     BP Readings from Last 3 Encounters:   07/05/19 128/70   04/05/19 122/76   03/29/19 128/87                 Passed - Recent (12 mo) or future (30 days) visit within the authorizing provider's specialty     Patient has had an office visit with the authorizing provider or a provider within the authorizing providers department within the previous 12 mos or has a future within next 30 days. See \"Patient Info\" tab in inbasket, or \"Choose Columns\" in Meds & Orders section of the refill encounter.              Passed - Medication is active on med list        Passed - Patient is age 18 or older        Passed - Normal serum creatinine on file in past 12 months     Recent Labs   Lab Test 07/05/19  1018   CR 0.97              Passed - Normal serum potassium on file in past 12 months     Recent Labs   Lab Test 07/05/19  1018   POTASSIUM 4.1                    Passed - Normal serum sodium on file in past 12 months     Recent Labs   Lab Test 07/05/19  1018                    "

## 2019-12-26 RX ORDER — HYDROCHLOROTHIAZIDE 25 MG/1
TABLET ORAL
Qty: 90 TABLET | Refills: 1 | Status: SHIPPED | OUTPATIENT
Start: 2019-12-26 | End: 2020-07-23

## 2019-12-26 NOTE — TELEPHONE ENCOUNTER
Prescription approved per [facility] Refill Protocol. JESSY Callejas R.N.

## 2020-01-25 DIAGNOSIS — E11.9 TYPE 2 DIABETES MELLITUS WITHOUT COMPLICATION, WITHOUT LONG-TERM CURRENT USE OF INSULIN (H): ICD-10-CM

## 2020-01-25 NOTE — LETTER
[place] [address]  [facility] [address]  [phone]        January 27, 2020      Garrick Espinosa  [address]  [facility] [address]          Dear Garrick Espinosa    APPOINTMENT REMINDER:   Our records indicates that it is time for you to be seen for a recheck.     Your current medication request will be approved for one refill but you will need to be seen before any additional refills can be approved.    Taking care of your health is important to us, and ongoing visits with your provider are vital to your care.    We look forward to seeing you in the near future.  You may call our office at [phone] to schedule a visit.    Please disregard this notice if you have already made an appointment.      Sincerely,      [facility]

## 2020-01-27 RX ORDER — SIMVASTATIN 40 MG
TABLET ORAL
Qty: 90 TABLET | Refills: 0 | Status: SHIPPED | OUTPATIENT
Start: 2020-01-27 | End: 2020-04-30

## 2020-01-27 RX ORDER — GLIPIZIDE 10 MG/1
TABLET, FILM COATED, EXTENDED RELEASE ORAL
Qty: 60 TABLET | Refills: 0 | Status: SHIPPED | OUTPATIENT
Start: 2020-01-27 | End: 2020-02-25

## 2020-01-27 NOTE — TELEPHONE ENCOUNTER
"Requested Prescriptions   Pending Prescriptions Disp Refills     simvastatin (ZOCOR) 40 MG tablet [Pharmacy Med Name: SIMVASTATIN 40 MG TABLET] 90 tablet 1     Sig: TAKE 1 TABLET BY MOUTH EVERYDAY AT BEDTIME   Last Written Prescription Date:  07/22/2019  Last Fill Quantity: 90,  # refills: 01   Last office visit: 7/5/2019 with prescribing provider:     Future Office Visit:      Statins Protocol Passed - 1/25/2020  9:56 AM        Passed - LDL on file in past 12 months     Recent Labs   Lab Test 02/01/19  0910   LDL 64             Passed - No abnormal creatine kinase in past 12 months     No lab results found.             Passed - Recent (12 mo) or future (30 days) visit within the authorizing provider's specialty     Patient has had an office visit with the authorizing provider or a provider within the authorizing providers department within the previous 12 mos or has a future within next 30 days. See \"Patient Info\" tab in inbasket, or \"Choose Columns\" in Meds & Orders section of the refill encounter.              Passed - Medication is active on med list        Passed - Patient is age 18 or older        glipiZIDE (GLUCOTROL XL) 10 MG 24 hr tablet [Pharmacy Med Name: GLIPIZIDE ER 10 MG TABLET] 180 tablet 1     Sig: TAKE 1 TABLET BY MOUTH TWICE A DAY   Last Written Prescription Date:  07/22/2019  Last Fill Quantity: 180,  # refills: 01   Last office visit: 7/5/2019 with prescribing provider:     Future Office Visit:      Sulfonylurea Agents Failed - 1/25/2020  9:56 AM        Failed - Blood pressure less than 140/90 in past 6 months     BP Readings from Last 3 Encounters:   07/05/19 128/70   04/05/19 122/76   03/29/19 128/87                 Failed - Patient has documented A1c within the specified period of time.     If HgbA1C is 8 or greater, it needs to be on file within the past 3 months.  If less than 8, must be on file within the past 6 months.     Recent Labs   Lab Test 07/05/19  1018   A1C 8.5*             Failed " "- Recent (6 mo) or future (30 days) visit within the authorizing provider's specialty     Patient had office visit in the last 6 months or has a visit in the next 30 days with authorizing provider or within the authorizing provider's specialty.  See \"Patient Info\" tab in inbasket, or \"Choose Columns\" in Meds & Orders section of the refill encounter.            Passed - Patient has documented LDL within the past 12 mos.     Recent Labs   Lab Test 02/01/19  0910   LDL 64             Passed - Patient has had a Microalbumin in the past 15 mos.     Recent Labs   Lab Test 07/05/19  1019   MICROL 7   UMALCR 9.24             Passed - Medication is active on med list        Passed - Patient is age 18 or older        Passed - Patient has a recent creatinine (normal) within the past 12 mos.     Recent Labs   Lab Test 07/05/19  1018   CR 0.97             "

## 2020-01-27 NOTE — TELEPHONE ENCOUNTER
Medication is being filled for 1 time refill only due to:  Patient needs to be seen because Due for diabetes follow up .     Letter sent.

## 2020-02-07 DIAGNOSIS — E11.9 TYPE 2 DIABETES MELLITUS WITHOUT COMPLICATION, WITHOUT LONG-TERM CURRENT USE OF INSULIN (H): ICD-10-CM

## 2020-02-07 NOTE — TELEPHONE ENCOUNTER
Patient is overdue for an appointment. Letter was mailed on 1/27/20. No future appointments scheduled.     Called patient, he states he will call back later to schedule. Please watch for an appointment.

## 2020-02-07 NOTE — TELEPHONE ENCOUNTER
"Requested Prescriptions   Pending Prescriptions Disp Refills     FARXIGA 10 MG TABS tablet [Pharmacy Med Name: FARXIGA 10 MG TABLET] 90 tablet 3     Sig: TAKE 1 TABLET BY MOUTH EVERY DAY   Last Written Prescription Date:  02/01/2019  Last Fill Quantity: 90,  # refills: 03  Last office visit: 7/5/2019 with prescribing provider:     Future Office Visit:      Sodium Glucose Co-Transport Inhibitor Agents Failed - 2/7/2020  2:33 AM        Failed - Blood pressure less than 140/90 in past 6 months     BP Readings from Last 3 Encounters:   07/05/19 128/70   04/05/19 122/76   03/29/19 128/87                 Failed - Patient has documented LDL within the past 12 mos.     Recent Labs   Lab Test 02/01/19  0910   LDL 64             Failed - Patient has documented A1c within the specified period of time.     If HgbA1C is 8 or greater, it needs to be on file within the past 3 months.  If less than 8, must be on file within the past 6 months.     Recent Labs   Lab Test 07/05/19  1018   A1C 8.5*             Failed - Recent (6 mo) or future (30 days) visit within the authorizing provider's specialty     Patient had office visit in the last 6 months or has a visit in the next 30 days with authorizing provider or within the authorizing provider's specialty.  See \"Patient Info\" tab in inbasket, or \"Choose Columns\" in Meds & Orders section of the refill encounter.            Passed - Patient has had a Microalbumin in the past 15 mos.     Recent Labs   Lab Test 07/05/19  1019   MICROL 7   UMALCR 9.24             Passed - No creatinine >1.4 or GFR <45 within the past 12 mos     Recent Labs   Lab Test 07/05/19  1018   GFRESTIMATED 85   GFRESTBLACK >90       Recent Labs   Lab Test 07/05/19  1018   CR 0.97             Passed - Medication is active on med list        Passed - Patient is age 18 or older        Passed - Patient has documented normal Potassium within the last 12 mos.     Recent Labs   Lab Test 07/05/19  1018   POTASSIUM 4.1      " "       pioglitazone (ACTOS) 30 MG tablet [Pharmacy Med Name: PIOGLITAZONE HCL 30 MG TABLET] 90 tablet 1     Sig: TAKE 1 TABLET BY MOUTH EVERY DAY   Last Written Prescription Date:  08/16/2019  Last Fill Quantity: 90,  # refills: 01   Last office visit: 7/5/2019 with prescribing provider:     Future Office Visit:      Thiazolidinedione Agents (TZDs)  Failed - 2/7/2020  2:33 AM        Failed - Blood pressure less than 140/90 in past 6 months     BP Readings from Last 3 Encounters:   07/05/19 128/70   04/05/19 122/76   03/29/19 128/87                 Failed - Patient has documented LDL within the past 12 mos.     Recent Labs   Lab Test 02/01/19  0910   LDL 64             Failed - Patient has documented A1c within the specified period of time.     If HgbA1C is 8 or greater, it needs to be on file within the past 3 months.  If less than 8, must be on file within the past 6 months.     Recent Labs   Lab Test 07/05/19  1018   A1C 8.5*             Failed - Recent (6 mo) or future (30 days) visit within the authorizing provider's specialty     Patient had office visit in the last 6 months or has a visit in the next 30 days with authorizing provider or within the authorizing provider's specialty.  See \"Patient Info\" tab in inbasket, or \"Choose Columns\" in Meds & Orders section of the refill encounter.            Passed - Patient has a normal ALT within the past 12 mos.     Recent Labs   Lab Test 07/05/19  1018   ALT 30             Passed - Patient has a normal AST within the past 12 mos.      Recent Labs   Lab Test 07/05/19  1018   AST 27             Passed - Patient has had a Microalbumin in the past 12 mos.     Recent Labs   Lab Test 07/05/19  1019   MICROL 7   UMALCR 9.24             Passed - Diagnosis not CHF        Passed - Medication is active on med list        Passed - Patient is age 18 or older        Passed - Patient has a normal serum Creatinine in the past 12 months     Recent Labs   Lab Test 07/05/19  1018   CR " 0.97

## 2020-02-11 RX ORDER — DAPAGLIFLOZIN 10 MG/1
TABLET, FILM COATED ORAL
Qty: 90 TABLET | Refills: 0 | Status: SHIPPED | OUTPATIENT
Start: 2020-02-11 | End: 2020-06-01

## 2020-02-11 RX ORDER — PIOGLITAZONEHYDROCHLORIDE 30 MG/1
TABLET ORAL
Qty: 90 TABLET | Refills: 0 | Status: SHIPPED | OUTPATIENT
Start: 2020-02-11 | End: 2020-02-26

## 2020-02-11 NOTE — TELEPHONE ENCOUNTER
Next 5 appointments (look out 90 days)    Feb 25, 2020  8:20 AM CST  [facility]YS[facility] with [facility] [address]  [facility] [address] [phone]        Medication is being filled for 1 time refill only due to:  Patient needs to be seen because due for f/u apt.

## 2020-02-23 ENCOUNTER — HEALTH MAINTENANCE LETTER (OUTPATIENT)
Age: 60
End: 2020-02-23

## 2020-02-25 ENCOUNTER — TELEPHONE (OUTPATIENT)
Dept: INTERNAL MEDICINE | Facility: CLINIC | Age: 60
End: 2020-02-25

## 2020-02-25 ENCOUNTER — OFFICE VISIT (OUTPATIENT)
Dept: INTERNAL MEDICINE | Facility: CLINIC | Age: 60
End: 2020-02-25
Payer: COMMERCIAL

## 2020-02-25 VITALS
WEIGHT: 268 LBS | RESPIRATION RATE: 16 BRPM | OXYGEN SATURATION: 96 % | HEART RATE: 94 BPM | HEIGHT: 68 IN | SYSTOLIC BLOOD PRESSURE: 138 MMHG | BODY MASS INDEX: 40.62 KG/M2 | TEMPERATURE: 98.3 F | DIASTOLIC BLOOD PRESSURE: 80 MMHG

## 2020-02-25 DIAGNOSIS — I10 ESSENTIAL HYPERTENSION, BENIGN: ICD-10-CM

## 2020-02-25 DIAGNOSIS — Z00.00 ENCOUNTER FOR PREVENTATIVE ADULT HEALTH CARE EXAMINATION: Primary | ICD-10-CM

## 2020-02-25 DIAGNOSIS — E78.5 HYPERLIPIDEMIA LDL GOAL <100: ICD-10-CM

## 2020-02-25 DIAGNOSIS — E11.9 TYPE 2 DIABETES MELLITUS WITHOUT COMPLICATION, WITHOUT LONG-TERM CURRENT USE OF INSULIN (H): ICD-10-CM

## 2020-02-25 DIAGNOSIS — J45.20 MILD INTERMITTENT ASTHMA WITHOUT COMPLICATION: ICD-10-CM

## 2020-02-25 DIAGNOSIS — Z12.5 SCREENING FOR PROSTATE CANCER: ICD-10-CM

## 2020-02-25 DIAGNOSIS — E66.01 MORBID OBESITY (H): ICD-10-CM

## 2020-02-25 LAB
ALBUMIN UR-MCNC: NEGATIVE MG/DL
APPEARANCE UR: CLEAR
BILIRUB UR QL STRIP: NEGATIVE
COLOR UR AUTO: YELLOW
ERYTHROCYTE [DISTWIDTH] IN BLOOD BY AUTOMATED COUNT: 15.1 % (ref 10–15)
GLUCOSE UR STRIP-MCNC: >=1000 MG/DL
HBA1C MFR BLD: 8.4 % (ref 0–5.6)
HCT VFR BLD AUTO: 42.7 % (ref 40–53)
HGB BLD-MCNC: 14 G/DL (ref 13.3–17.7)
HGB UR QL STRIP: NEGATIVE
KETONES UR STRIP-MCNC: NEGATIVE MG/DL
LEUKOCYTE ESTERASE UR QL STRIP: NEGATIVE
MCH RBC QN AUTO: 27.9 PG (ref 26.5–33)
MCHC RBC AUTO-ENTMCNC: 32.8 G/DL (ref 31.5–36.5)
MCV RBC AUTO: 85 FL (ref 78–100)
NITRATE UR QL: NEGATIVE
PH UR STRIP: 7 PH (ref 5–7)
PLATELET # BLD AUTO: 289 10E9/L (ref 150–450)
RBC # BLD AUTO: 5.02 10E12/L (ref 4.4–5.9)
SOURCE: ABNORMAL
SP GR UR STRIP: 1.02 (ref 1–1.03)
UROBILINOGEN UR STRIP-ACNC: 0.2 EU/DL (ref 0.2–1)
WBC # BLD AUTO: 8 10E9/L (ref 4–11)

## 2020-02-25 PROCEDURE — 36415 COLL VENOUS BLD VENIPUNCTURE: CPT | Performed by: INTERNAL MEDICINE

## 2020-02-25 PROCEDURE — 82043 UR ALBUMIN QUANTITATIVE: CPT | Performed by: INTERNAL MEDICINE

## 2020-02-25 PROCEDURE — 83036 HEMOGLOBIN GLYCOSYLATED A1C: CPT | Performed by: INTERNAL MEDICINE

## 2020-02-25 PROCEDURE — 80061 LIPID PANEL: CPT | Performed by: INTERNAL MEDICINE

## 2020-02-25 PROCEDURE — 81003 URINALYSIS AUTO W/O SCOPE: CPT | Performed by: INTERNAL MEDICINE

## 2020-02-25 PROCEDURE — G0103 PSA SCREENING: HCPCS | Performed by: INTERNAL MEDICINE

## 2020-02-25 PROCEDURE — 85027 COMPLETE CBC AUTOMATED: CPT | Performed by: INTERNAL MEDICINE

## 2020-02-25 PROCEDURE — 99214 OFFICE O/P EST MOD 30 MIN: CPT | Mod: 25 | Performed by: INTERNAL MEDICINE

## 2020-02-25 PROCEDURE — 84443 ASSAY THYROID STIM HORMONE: CPT | Performed by: INTERNAL MEDICINE

## 2020-02-25 PROCEDURE — 99396 PREV VISIT EST AGE 40-64: CPT | Performed by: INTERNAL MEDICINE

## 2020-02-25 PROCEDURE — 80053 COMPREHEN METABOLIC PANEL: CPT | Performed by: INTERNAL MEDICINE

## 2020-02-25 RX ORDER — GLIPIZIDE 10 MG/1
10 TABLET, FILM COATED, EXTENDED RELEASE ORAL 2 TIMES DAILY
Qty: 180 TABLET | Refills: 1 | Status: SHIPPED | OUTPATIENT
Start: 2020-02-25 | End: 2020-09-02

## 2020-02-25 RX ORDER — METFORMIN HCL 500 MG
TABLET, EXTENDED RELEASE 24 HR ORAL
Qty: 360 TABLET | Refills: 1 | Status: SHIPPED | OUTPATIENT
Start: 2020-02-25 | End: 2020-09-18

## 2020-02-25 RX ORDER — GLIPIZIDE 10 MG/1
10 TABLET, FILM COATED, EXTENDED RELEASE ORAL
Qty: 180 TABLET | Refills: 1 | Status: SHIPPED | OUTPATIENT
Start: 2020-02-25 | End: 2020-02-25

## 2020-02-25 ASSESSMENT — ENCOUNTER SYMPTOMS
HEMATOCHEZIA: 0
ARTHRALGIAS: 0
PARESTHESIAS: 0
EYE PAIN: 0
PALPITATIONS: 0
WEAKNESS: 0
JOINT SWELLING: 0
DIZZINESS: 0
SORE THROAT: 0
DYSURIA: 0
DIARRHEA: 0
CONSTIPATION: 0
COUGH: 0
NERVOUS/ANXIOUS: 0
CHILLS: 0
FEVER: 0
HEADACHES: 0
ABDOMINAL PAIN: 0
HEARTBURN: 0
MYALGIAS: 1
HEMATURIA: 0
SHORTNESS OF BREATH: 0
NAUSEA: 0
FREQUENCY: 0

## 2020-02-25 ASSESSMENT — MIFFLIN-ST. JEOR: SCORE: 2005.14

## 2020-02-25 NOTE — TELEPHONE ENCOUNTER
Fax from pharmacy asking if Glipizide XL change is supposed to be TID or BID?     Pt had OV today.     Please confirm if this is correct. Pt has been on BID before.       Lab Results   Component Value Date    A1C 8.4 02/25/2020    A1C 8.5 07/05/2019    A1C 8.5 02/01/2019    A1C 8.5 10/20/2018    A1C 9.1 07/24/2018

## 2020-02-25 NOTE — PROGRESS NOTES
SUBJECTIVE:   CC: Garrick Espinosa is an 59 year old male who presents for preventative health visit.     Healthy Habits:     Getting at least 3 servings of Calcium per day:  Yes    Bi-annual eye exam:  Yes    Dental care twice a year:  Yes    Sleep apnea or symptoms of sleep apnea:  Sleep apnea    Diet:  Regular (no restrictions)    Frequency of exercise:  None    Taking medications regularly:  Yes    Medication side effects:  None    PHQ-2 Total Score: 0    Additional concerns today:  Yes          PROBLEMS TO ADD ON...  Has h/o HTN. on medical treatment. BP has been controlled. No side effects from medications. No CP, HA, dizziness. good compliance with medications and low salt diet.  Has H/O hyperlipidemia. On medical treatment and diet. No side effects. No muscle weakness, myalgias or upset stomach.   Has H/O DM. On diet , exercise and oral treatment. Blood sugars are controlled. No parestesias. No hypoglycemias.  Has h/o obesity, no change in weight. Plans to start exercise.     Today's PHQ-2 Score:   PHQ-2 ( 1999 [employer]) 2/25/2020   Q1: Little interest or pleasure in doing things 0   Q2: Feeling down, depressed or hopeless 0   PHQ-2 Score 0   Q1: Little interest or pleasure in doing things Not at all   Q2: Feeling down, depressed or hopeless Not at all   PHQ-2 Score 0       Abuse: Current or Past(Physical, Sexual or Emotional)- No  Do you feel safe in your environment? Yes        Social History     Tobacco Use     Smoking status: Never Smoker     Smokeless tobacco: Never Used   Substance Use Topics     Alcohol use: Yes     Comment: Occ     If you drink alcohol do you typically have >3 drinks per day or >7 drinks per week? No    Alcohol Use 2/25/2020   Prescreen: >3 drinks/day or >7 drinks/week? No       Last PSA:   PSA   Date Value Ref Range Status   07/24/2018 0.32 0 - 4 ug/L Final     Comment:     Assay Method:  Chemiluminescence using Siemens Vista analyzer       Reviewed orders with patient. Reviewed health  "maintenance and updated orders accordingly - Yes  Lab work is in process  Labs reviewed in EPIC    Reviewed and updated as needed this visit by clinical staff  Tobacco  Allergies         Reviewed and updated as needed this visit by Provider            Review of Systems   Constitutional: Negative for chills and fever.   HENT: Negative for congestion, ear pain, hearing loss and sore throat.    Eyes: Negative for pain and visual disturbance.   Respiratory: Negative for cough and shortness of breath.    Cardiovascular: Negative for chest pain, palpitations and peripheral edema.   Gastrointestinal: Negative for abdominal pain, constipation, diarrhea, heartburn, hematochezia and nausea.   Genitourinary: Negative for discharge, dysuria, frequency, genital sores, hematuria, impotence and urgency.   Musculoskeletal: Positive for myalgias. Negative for arthralgias and joint swelling.   Skin: Negative for rash.   Neurological: Negative for dizziness, weakness, headaches and paresthesias.   Psychiatric/Behavioral: Negative for mood changes. The patient is not nervous/anxious.          OBJECTIVE:   /80   Pulse 94   Temp 98.3  F (36.8  C) (Oral)   Resp 16   Ht 1.727 m (5' 8\")   Wt 121.6 kg (268 lb)   SpO2 96%   BMI 40.75 kg/m      Physical Exam  GENERAL: obese, alert and no distress  EYES: Eyes grossly normal to inspection, PERRL and conjunctivae and sclerae normal  HENT: ear canals and TM's normal, nose and mouth without ulcers or lesions  NECK: no adenopathy, no asymmetry, masses, or scars and thyroid normal to palpation  RESP: lungs clear to auscultation - no rales, rhonchi or wheezes  CV: regular rate and rhythm, normal S1 S2, no S3 or S4, no murmur, click or rub, no peripheral edema and peripheral pulses strong  ABDOMEN: soft, nontender, no hepatosplenomegaly, no masses and bowel sounds normal  RECTAL: normal sphincter tone, no rectal masses, prostate normal size, smooth, nontender without nodules or " "masses  MS: no gross musculoskeletal defects noted, no edema  SKIN: no suspicious lesions or rashes  NEURO: Normal strength and tone, mentation intact and speech normal  PSYCH: mentation appears normal, affect normal/bright    Diagnostic Test Results:  Labs reviewed in Epic    ASSESSMENT/PLAN:       ICD-10-CM    1. Encounter for preventative adult health care examination Z00.00 CBC with platelets     Comprehensive metabolic panel     Lipid panel reflex to direct LDL Fasting     TSH with free T4 reflex     Prostate spec antigen screen     Hemoglobin A1c     *UA reflex to Microscopic     Albumin Random Urine Quantitative with Creat Ratio   2. Type 2 diabetes mellitus without complication, without long-term current use of insulin (H) E11.9 glipiZIDE (GLUCOTROL XL) 10 MG 24 hr tablet     metFORMIN (GLUCOPHAGE-XR) 500 MG 24 hr tablet     Hemoglobin A1c     OFFICE/OUTPT VISIT,EST,LEVL III   3. Essential hypertension, benign I10 CBC with platelets     Comprehensive metabolic panel     Lipid panel reflex to direct LDL Fasting     TSH with free T4 reflex     *UA reflex to Microscopic     Albumin Random Urine Quantitative with Creat Ratio     OFFICE/OUTPT VISIT,EST,LEVL III   4. Hyperlipidemia LDL goal <100 E78.5 OFFICE/OUTPT VISIT,EST,LEVL III   5. Screening for prostate cancer Z12.5 Prostate spec antigen screen   6. 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    Weight as of 7/5/19: 119.3 kg (263 lb).     Weight management plan: Discussed healthy diet and exercise guidelines     reports that " he has never smoked. He has never used smokeless tobacco.      Counseling Resources:  ATP IV Guidelines  Pooled Cohorts Equation Calculator  FRAX Risk Assessment  ICSI Preventive Guidelines  Dietary Guidelines for Americans, 2010  USDA's MyPlate  ASA Prophylaxis  Lung CA Screening    Ibrahima Smith MD  [facility]

## 2020-02-25 NOTE — NURSING NOTE
"Vital signs:  Temp: 98.3  F (36.8  C) Temp src: Oral BP: 138/80 Pulse: 94   Resp: 16 SpO2: 96 %     Height: 172.7 cm (5' 8\") Weight: 121.6 kg (268 lb)  Estimated body mass index is 40.75 kg/m  as calculated from the following:    Height as of this encounter: 1.727 m (5' 8\").    Weight as of this encounter: 121.6 kg (268 lb).          "

## 2020-02-26 LAB
ALBUMIN SERPL-MCNC: 3.7 G/DL (ref 3.4–5)
ALP SERPL-CCNC: 56 U/L (ref 40–150)
ALT SERPL W P-5'-P-CCNC: 26 U/L (ref 0–70)
ANION GAP SERPL CALCULATED.3IONS-SCNC: 7 MMOL/L (ref 3–14)
AST SERPL W P-5'-P-CCNC: 25 U/L (ref 0–45)
BILIRUB SERPL-MCNC: 0.3 MG/DL (ref 0.2–1.3)
BUN SERPL-MCNC: 15 MG/DL (ref 7–30)
CALCIUM SERPL-MCNC: 8.6 MG/DL (ref 8.5–10.1)
CHLORIDE SERPL-SCNC: 106 MMOL/L (ref 94–109)
CHOLEST SERPL-MCNC: 173 MG/DL
CO2 SERPL-SCNC: 25 MMOL/L (ref 20–32)
CREAT SERPL-MCNC: 0.91 MG/DL (ref 0.66–1.25)
CREAT UR-MCNC: 94 MG/DL
GFR SERPL CREATININE-BSD FRML MDRD: >90 ML/MIN/{1.73_M2}
GLUCOSE SERPL-MCNC: 138 MG/DL (ref 70–99)
HDLC SERPL-MCNC: 49 MG/DL
LDLC SERPL CALC-MCNC: 83 MG/DL
MICROALBUMIN UR-MCNC: 14 MG/L
MICROALBUMIN/CREAT UR: 14.41 MG/G CR (ref 0–17)
NONHDLC SERPL-MCNC: 124 MG/DL
POTASSIUM SERPL-SCNC: 4.3 MMOL/L (ref 3.4–5.3)
PROT SERPL-MCNC: 7.4 G/DL (ref 6.8–8.8)
PSA SERPL-ACNC: 0.44 UG/L (ref 0–4)
SODIUM SERPL-SCNC: 138 MMOL/L (ref 133–144)
TRIGL SERPL-MCNC: 207 MG/DL
TSH SERPL DL<=0.005 MIU/L-ACNC: 3.05 MU/L (ref 0.4–4)

## 2020-02-26 RX ORDER — PIOGLITAZONEHYDROCHLORIDE 45 MG/1
45 TABLET ORAL DAILY
Qty: 90 TABLET | Refills: 3 | Status: SHIPPED | OUTPATIENT
Start: 2020-02-26 | End: 2021-03-18

## 2020-02-26 ASSESSMENT — ASTHMA QUESTIONNAIRES: ACT_TOTALSCORE: 22

## 2020-02-27 RX ORDER — ALBUTEROL SULFATE 90 UG/1
2 AEROSOL, METERED RESPIRATORY (INHALATION) EVERY 6 HOURS
Qty: 6.7 INHALER | Refills: 1 | Status: SHIPPED | OUTPATIENT
Start: 2020-02-27 | End: 2021-04-21

## 2020-02-27 NOTE — TELEPHONE ENCOUNTER
"Requested Prescriptions   Pending Prescriptions Disp Refills     albuterol (PROAIR HFA/PROVENTIL HFA/VENTOLIN HFA) 108 (90 Base) MCG/ACT inhaler [Pharmacy Med Name: ALBUTEROL HFA (PROVENTIL) INH] 6.7 Inhaler 1     Sig: INHALE 2 PUFFS INTO THE LUNGS EVERY 6 HOURS       Asthma Maintenance Inhalers - Anticholinergics Failed - 2/25/2020  1:53 AM        Failed - Asthma control assessment score within normal limits in last 6 months     Please review ACT score.           Passed - Patient is age 12 years or older        Passed - Medication is active on med list        Passed - Recent (6 mo) or future (30 days) visit within the authorizing provider's specialty     Patient had office visit in the last 6 months or has a visit in the next 30 days with authorizing provider or within the authorizing provider's specialty.  See \"Patient Info\" tab in inbasket, or \"Choose Columns\" in Meds & Orders section of the refill encounter.              "

## 2020-02-27 NOTE — TELEPHONE ENCOUNTER
ACT Total Scores 2/25/2020   ACT TOTAL SCORE (Goal Greater than or Equal to 20) 22   In the past 12 months, how many times did you visit the emergency room for your asthma without being admitted to the hospital? 0   In the past 12 months, how many times were you hospitalized overnight because of your asthma? 0     Patient has a valid ACT within normal range.    Prescription approved per [facility].

## 2020-03-18 DIAGNOSIS — I10 ESSENTIAL HYPERTENSION, BENIGN: ICD-10-CM

## 2020-03-18 RX ORDER — LOSARTAN POTASSIUM 100 MG/1
100 TABLET ORAL DAILY
Qty: 90 TABLET | Refills: 1 | Status: CANCELLED | OUTPATIENT
Start: 2020-03-18

## 2020-03-18 NOTE — TELEPHONE ENCOUNTER
"Requested Prescriptions   Pending Prescriptions Disp Refills     losartan (COZAAR) 100 MG tablet 90 tablet 1     Sig: Take 1 tablet (100 mg) by mouth daily  Last Written Prescription Date:  12/12/19  Last Fill Quantity: 90,  # refills: 1   Last office visit: 2/25/2020 with prescribing provider:  Luis   Future Office Visit:         Angiotensin-II Receptors Passed - 3/18/2020 12:53 PM        Passed - Last blood pressure under 140/90 in past 12 months     BP Readings from Last 3 Encounters:   02/25/20 138/80   07/05/19 128/70   04/05/19 122/76                 Passed - Recent (12 mo) or future (30 days) visit within the authorizing provider's specialty     Patient has had an office visit with the authorizing provider or a provider within the authorizing providers department within the previous 12 mos or has a future within next 30 days. See \"Patient Info\" tab in inbasket, or \"Choose Columns\" in Meds & Orders section of the refill encounter.              Passed - Medication is active on med list        Passed - Patient is age 18 or older        Passed - Normal serum creatinine on file in past 12 months     Recent Labs   Lab Test 02/25/20  0907   CR 0.91       Ok to refill medication if creatinine is low          Passed - Normal serum potassium on file in past 12 months     Recent Labs   Lab Test 02/25/20  0907   POTASSIUM 4.3                       "

## 2020-03-19 NOTE — TELEPHONE ENCOUNTER
Fax from [facility]:    Having issues getting losartan 100 mg.  Please send an alternative.  (losartan 50 mg with appropriate directions.)    Lizette Rice CMA  [facility]  [place]

## 2020-03-20 RX ORDER — LOSARTAN POTASSIUM 50 MG/1
100 TABLET ORAL DAILY
Qty: 180 TABLET | Refills: 1 | Status: SHIPPED | OUTPATIENT
Start: 2020-03-20 | End: 2021-02-12

## 2020-03-20 NOTE — TELEPHONE ENCOUNTER
Losartan 100 mg not available.     Pended the 50 mg Rx to take 2 daily.     Last OV on 2/25/20     BP Readings from Last 3 Encounters:   02/25/20 138/80   07/05/19 128/70   04/05/19 122/76     Creatinine   Date Value Ref Range Status   02/25/2020 0.91 0.66 - 1.25 mg/dL Final

## 2020-03-21 ENCOUNTER — MYC REFILL (OUTPATIENT)
Dept: URGENT CARE | Facility: URGENT CARE | Age: 60
End: 2020-03-21

## 2020-03-21 DIAGNOSIS — M10.071 ACUTE IDIOPATHIC GOUT OF RIGHT ANKLE: ICD-10-CM

## 2020-03-21 RX ORDER — INDOMETHACIN 50 MG/1
50 CAPSULE ORAL 2 TIMES DAILY WITH MEALS
Qty: 30 CAPSULE | Refills: 0 | Status: SHIPPED | OUTPATIENT
Start: 2020-03-21 | End: 2021-04-21

## 2020-05-30 DIAGNOSIS — E11.9 TYPE 2 DIABETES MELLITUS WITHOUT COMPLICATION, WITHOUT LONG-TERM CURRENT USE OF INSULIN (H): ICD-10-CM

## 2020-06-01 RX ORDER — DAPAGLIFLOZIN 10 MG/1
TABLET, FILM COATED ORAL
Qty: 90 TABLET | Refills: 0 | Status: SHIPPED | OUTPATIENT
Start: 2020-06-01 | End: 2020-06-02

## 2020-06-01 NOTE — TELEPHONE ENCOUNTER
"Farxiga refill request.     Last OV on 2/25/20. Provider approval. Needs to recheck A1C soon?     Requested Prescriptions   Pending Prescriptions Disp Refills     FARXIGA 10 MG TABS tablet [Pharmacy Med Name: FARXIGA 10 MG TABLET] 90 tablet 0     Sig: TAKE 1 TABLET BY MOUTH EVERY DAY       Sodium Glucose Co-Transport Inhibitor Agents Failed - 5/30/2020  9:31 AM        Failed - Patient has documented A1c within the specified period of time.     If HgbA1C is 8 or greater, it needs to be on file within the past 3 months.  If less than 8, must be on file within the past 6 months.     Recent Labs   Lab Test 02/25/20  0907   A1C 8.4*             Passed - No creatinine >1.4 or GFR <45 within the past 12 mos     Recent Labs   Lab Test 02/25/20  0907   GFRESTIMATED >90   GFRESTBLACK >90       Recent Labs   Lab Test 02/25/20  0907   CR 0.91             Passed - Medication is active on med list        Passed - Patient is age 18 or older        Passed - Patient has documented normal Potassium within the last 12 mos.     Recent Labs   Lab Test 02/25/20  0907   POTASSIUM 4.3             Passed - Recent (6 mo) or future (30 days) visit within the authorizing provider's specialty     Patient had office visit in the last 6 months or has a visit in the next 30 days with authorizing provider or within the authorizing provider's specialty.  See \"Patient Info\" tab in inbasket, or \"Choose Columns\" in Meds & Orders section of the refill encounter.                 "

## 2020-06-02 DIAGNOSIS — E11.9 TYPE 2 DIABETES MELLITUS WITHOUT COMPLICATION, WITHOUT LONG-TERM CURRENT USE OF INSULIN (H): ICD-10-CM

## 2020-06-02 RX ORDER — DAPAGLIFLOZIN 10 MG/1
TABLET, FILM COATED ORAL
Qty: 90 TABLET | Refills: 0 | Status: SHIPPED | OUTPATIENT
Start: 2020-06-02 | End: 2020-06-30

## 2020-06-02 NOTE — TELEPHONE ENCOUNTER
Routing refill request to provider for review/approval because:  Labs out of range:  A1C  Labs not current:  A1C    Chinyere Varghese RN, BSN  [facility]

## 2020-06-04 ENCOUNTER — MYC REFILL (OUTPATIENT)
Dept: INTERNAL MEDICINE | Facility: CLINIC | Age: 60
End: 2020-06-04

## 2020-06-04 DIAGNOSIS — E11.9 TYPE 2 DIABETES MELLITUS WITHOUT COMPLICATION, WITHOUT LONG-TERM CURRENT USE OF INSULIN (H): ICD-10-CM

## 2020-06-04 RX ORDER — DAPAGLIFLOZIN 10 MG/1
10 TABLET, FILM COATED ORAL DAILY
Qty: 90 TABLET | Refills: 0 | Status: CANCELLED | OUTPATIENT
Start: 2020-06-04

## 2020-06-28 ENCOUNTER — NURSE TRIAGE (OUTPATIENT)
Dept: NURSING | Facility: CLINIC | Age: 60
End: 2020-06-28

## 2020-06-28 DIAGNOSIS — E11.9 TYPE 2 DIABETES MELLITUS WITHOUT COMPLICATION, WITHOUT LONG-TERM CURRENT USE OF INSULIN (H): ICD-10-CM

## 2020-06-28 NOTE — TELEPHONE ENCOUNTER
"[facility] calling - they never received the prescription that was sent on 6/2 for Farxiga - they were having computer issues at that time. Gave verbal order as written below. No further questions / concerns.  FARXIGA 10 MG TABS tablet  90 tablet  0  6/2/2020   No    Sig: TAKE 1 TABLET BY MOUTH EVERY DAY    Sent to pharmacy as: Farxiga 10 MG Oral Tablet (dapagliflozin)    Class: E-Prescribe    Order: [id number]      Ashley Kent RN on 6/28/2020 at 4:23 PM    Additional Information    Negative: Drug overdose and nurse unable to answer question    Negative: Caller requesting information not related to medicine    Negative: Caller requesting a prescription for Strep throat and has a positive culture result    Negative: Rash while taking a medication or within 3 days of stopping it    Negative: Immunization reaction suspected    Negative: [1] Asthma AND [2] having symptoms of asthma (cough, wheezing, etc)    Negative: MORE THAN A DOUBLE DOSE of a prescription or over-the-counter (OTC) drug    Negative: [1] DOUBLE DOSE (an extra dose or lesser amount) of over-the-counter (OTC) drug AND [2] any symptoms (e.g., dizziness, nausea, pain, sleepiness)    Negative: [1] DOUBLE DOSE (an extra dose or lesser amount) of prescription drug AND [2] any symptoms (e.g., dizziness, nausea, pain, sleepiness)    Negative: Took another person's prescription drug    Negative: [1] DOUBLE DOSE (an extra dose or lesser amount) of prescription drug AND [2] NO symptoms (Exception: a double dose of antibiotics)    Negative: Diabetes drug error or overdose (e.g., insulin or extra dose)    Negative: [1] Request for URGENT new prescription or refill of \"essential\" medication (i.e., likelihood of harm to patient if not taken) AND [2] triager unable to fill per unit policy    Negative: [1] Prescription not at pharmacy AND [2] was prescribed today by PCP    Negative: Pharmacy calling with prescription questions and triager unable to answer question    Negative: " Caller has urgent medication question about med that PCP prescribed and triager unable to answer question    Negative: Caller has NON-URGENT medication question about med that PCP prescribed and triager unable to answer question    Negative: Caller requesting a NON-URGENT new prescription or refill and triager unable to refill per unit policy    Negative: Caller has medication question about med not prescribed by PCP and triager unable to answer question (e.g., compatibility with other med, storage)    Negative: [1] DOUBLE DOSE (an extra dose or lesser amount) of over-the-counter (OTC) drug AND [2] NO symptoms    Negative: [1] DOUBLE DOSE (an extra dose or lesser amount) of antibiotic drug AND [2] NO symptoms    Negative: Caller has medication question only, adult not sick, and triager answers question    Negative: Caller has medication question, adult has minor symptoms, caller declines triage, and triager answers question    Negative: Caller requesting information about medication during pregnancy; adult is not ill and triager answers question    Negative: Caller requesting information about medication use with breastfeeding; neither adult nor infant is ill, and triager answers question    Negative: Caller requesting a refill, no triage required, and triager able to refill per unit policy    Pharmacy calling with prescription question and triager answers question    Protocols used: MEDICATION QUESTION CALL-A-

## 2020-06-30 RX ORDER — DAPAGLIFLOZIN 10 MG/1
TABLET, FILM COATED ORAL
Qty: 90 TABLET | Refills: 0 | Status: SHIPPED | OUTPATIENT
Start: 2020-06-30 | End: 2020-09-18

## 2020-06-30 NOTE — TELEPHONE ENCOUNTER
"Requested Prescriptions   Pending Prescriptions Disp Refills     FARXIGA 10 MG TABS tablet [Pharmacy Med Name: FARXIGA 10 MG TABLET] 90 tablet 0     Sig: TAKE 1 TABLET BY MOUTH EVERY DAY       Sodium Glucose Co-Transport Inhibitor Agents Failed - 6/28/2020  4:08 PM        Failed - Patient has documented A1c within the specified period of time.     If HgbA1C is 8 or greater, it needs to be on file within the past 3 months.  If less than 8, must be on file within the past 6 months.     Recent Labs   Lab Test 02/25/20 0907   A1C 8.4*             Passed - No creatinine >1.4 or GFR <45 within the past 12 mos     Recent Labs   Lab Test 02/25/20  0907   GFRESTIMATED >90   GFRESTBLACK >90       Recent Labs   Lab Test 02/25/20 0907   CR 0.91             Passed - Medication is active on med list        Passed - Patient is age 18 or older        Passed - Patient has documented normal Potassium within the last 12 mos.     Recent Labs   Lab Test 02/25/20  0907   POTASSIUM 4.3             Passed - Recent (6 mo) or future (30 days) visit within the authorizing provider's specialty     Patient had office visit in the last 6 months or has a visit in the next 30 days with authorizing provider or within the authorizing provider's specialty.  See \"Patient Info\" tab in inbasket, or \"Choose Columns\" in Meds & Orders section of the refill encounter.                 "

## 2020-06-30 NOTE — TELEPHONE ENCOUNTER
Pending Prescriptions:                       Disp   Refills    FARXIGA 10 MG TABS tablet [Pharmacy Med Na*90 tab*0        Sig: TAKE 1 TABLET BY MOUTH EVERY DAY    Routing refill request to provider for review/approval because:  Patient fails protocol     Pharmacy reports they did not receive last prescription

## 2020-07-23 DIAGNOSIS — I10 ESSENTIAL HYPERTENSION, BENIGN: ICD-10-CM

## 2020-07-23 RX ORDER — HYDROCHLOROTHIAZIDE 25 MG/1
TABLET ORAL
Qty: 90 TABLET | Refills: 1 | Status: SHIPPED | OUTPATIENT
Start: 2020-07-23 | End: 2021-03-02

## 2020-07-23 NOTE — TELEPHONE ENCOUNTER
Pending Prescriptions:                       Disp   Refills    hydrochlorothiazide (HYDRODIURIL) 25 MG t*90 tab*1            Sig: TAKE 1 TABLET BY MOUTH EVERY DAY    Prescription approved per [facility] Refill Protocol.

## 2020-08-28 ENCOUNTER — MYC MEDICAL ADVICE (OUTPATIENT)
Dept: PEDIATRICS | Facility: CLINIC | Age: 60
End: 2020-08-28

## 2020-08-28 DIAGNOSIS — E11.9 TYPE 2 DIABETES MELLITUS WITHOUT COMPLICATION, WITHOUT LONG-TERM CURRENT USE OF INSULIN (H): ICD-10-CM

## 2020-08-28 NOTE — LETTER
[facility]  [address]  [facility] [address]  Phone: [phone]        September 2, 2020      Garrick Espinosa                                                                                                                                [address]  [facility] [address]            Dear Mr. Espinosa,    We are concerned about your health care.  Many medications require routine follow-up with your Doctor.  Your due for a follow up appointment with Dr. Smith.  Please call the clinic and schedule an appointment.  This appointment could be in clinic or virtual.  Thanks for your attention to this matter.       Thank you,      [facility]

## 2020-08-28 NOTE — TELEPHONE ENCOUNTER
Patient is due for an appointment. No future appointments scheduled. [employer] message sent.

## 2020-09-02 RX ORDER — GLIPIZIDE 10 MG/1
TABLET, FILM COATED, EXTENDED RELEASE ORAL
Qty: 180 TABLET | Refills: 0 | Status: SHIPPED | OUTPATIENT
Start: 2020-09-02 | End: 2020-12-02

## 2020-09-02 NOTE — TELEPHONE ENCOUNTER
"Pt has not scheduled yet.     Provider approval needed.     Requested Prescriptions   Pending Prescriptions Disp Refills     glipiZIDE (GLUCOTROL XL) 10 MG 24 hr tablet [Pharmacy Med Name: GLIPIZIDE ER 10 MG TABLET] 180 tablet 0     Sig: TAKE 1 TABLET BY MOUTH TWICE A DAY       Sulfonylurea Agents Failed - 9/2/2020 10:27 AM        Failed - Patient has documented A1c within the specified period of time.     If HgbA1C is 8 or greater, it needs to be on file within the past 3 months.  If less than 8, must be on file within the past 6 months.     Recent Labs   Lab Test 02/25/20  0907   A1C 8.4*             Failed - Recent (6 mo) or future (30 days) visit within the authorizing provider's specialty     Patient had office visit in the last 6 months or has a visit in the next 30 days with authorizing provider or within the authorizing provider's specialty.  See \"Patient Info\" tab in inbasket, or \"Choose Columns\" in Meds & Orders section of the refill encounter.            Passed - Medication is active on med list        Passed - Patient is age 18 or older        Passed - Patient has a recent creatinine (normal) within the past 12 mos.     Recent Labs   Lab Test 02/25/20  0907   CR 0.91       Ok to refill medication if creatinine is low               "

## 2020-09-18 ENCOUNTER — VIRTUAL VISIT (OUTPATIENT)
Dept: INTERNAL MEDICINE | Facility: CLINIC | Age: 60
End: 2020-09-18
Payer: COMMERCIAL

## 2020-09-18 DIAGNOSIS — E78.5 HYPERLIPIDEMIA LDL GOAL <100: ICD-10-CM

## 2020-09-18 DIAGNOSIS — I10 ESSENTIAL HYPERTENSION, BENIGN: ICD-10-CM

## 2020-09-18 DIAGNOSIS — E11.9 TYPE 2 DIABETES MELLITUS WITHOUT COMPLICATION, WITHOUT LONG-TERM CURRENT USE OF INSULIN (H): Primary | ICD-10-CM

## 2020-09-18 PROCEDURE — 99214 OFFICE O/P EST MOD 30 MIN: CPT | Mod: 95 | Performed by: INTERNAL MEDICINE

## 2020-09-18 RX ORDER — METFORMIN HCL 500 MG
TABLET, EXTENDED RELEASE 24 HR ORAL
Qty: 360 TABLET | Refills: 1 | Status: SHIPPED | OUTPATIENT
Start: 2020-09-18 | End: 2021-03-15

## 2020-09-18 RX ORDER — DAPAGLIFLOZIN 10 MG/1
10 TABLET, FILM COATED ORAL DAILY
Qty: 90 TABLET | Refills: 1 | Status: SHIPPED | OUTPATIENT
Start: 2020-09-18 | End: 2021-03-15

## 2020-09-18 NOTE — PROGRESS NOTES
"Garrick Espinosa is a 60 year old male who is being evaluated via a billable video visit.      The patient has been notified of following:     \"This video visit will be conducted via a call between you and your physician/provider. We have found that certain health care needs can be provided without the need for an in-person physical exam.  This service lets us provide the care you need with a video conversation.  If a prescription is necessary we can send it directly to your pharmacy.  If lab work is needed we can place an order for that and you can then stop by our lab to have the test done at a later time.    Video visits are billed at different rates depending on your insurance coverage.  Please reach out to your insurance provider with any questions.    If during the course of the call the physician/provider feels a video visit is not appropriate, you will not be charged for this service.\"    Patient has given verbal consent for Video visit? Yes  How would you like to obtain your AVS? MyChart  If you are dropped from the video visit, the video invite should be resent to: Text to cell phone: [phone]  Will anyone else be joining your video visit? No    Subjective     Garrick Espinosa is a 60 year old male who presents today via video visit for the following health issues:    HPI    Diabetes Follow-up  Has H/O DM. On diet , exercise and oral treatment. Blood sugars are controlled. No parestesias. No hypoglycemias.  BG - 130-160, checking daily.     How often are you checking your blood sugar? A few times a week  What time of day are you checking your blood sugars (select all that apply)?  before breakfast   Have you had any blood sugars above 200?  No  Have you had any blood sugars below 70?  No    What symptoms do you notice when your blood sugar is low?  Weak    What concerns do you have today about your diabetes? None     Do you have any of these symptoms? (Select all that apply)  No numbness or tingling in " feet.  No redness, sores or blisters on feet.  No complaints of excessive thirst.  No reports of blurry vision.  No significant changes to weight.        Hyperlipidemia Follow-Up  Has H/O hyperlipidemia. On medical treatment and diet. No side effects. No muscle weakness, myalgias or upset stomach.       Are you regularly taking any medication or supplement to lower your cholesterol?   Yes- simvastatin     Are you having muscle aches or other side effects that you think could be caused by your cholesterol lowering medication?  No    Hypertension Follow-up   Has h/o HTN. on medical treatment. BP has been controlled. No side effects from medications. No CP, HA, dizziness. good compliance with medications and low salt diet.      Do you check your blood pressure regularly outside of the clinic? Yes     Are you following a low salt diet? Yes    Are your blood pressures ever more than 140 on the top number (systolic) OR more   than 90 on the bottom number (diastolic), for example 140/90? No    BP Readings from Last 2 Encounters:   02/25/20 138/80   07/05/19 128/70     Hemoglobin A1C (%)   Date Value   02/25/2020 8.4 (H)   07/05/2019 8.5 (H)     LDL Cholesterol Calculated (mg/dL)   Date Value   02/25/2020 83   02/01/2019 64         How many servings of fruits and vegetables do you eat daily?  4 or more    On average, how many sweetened beverages do you drink each day (Examples: soda, juice, sweet tea, etc.  Do NOT count diet or artificially sweetened beverages)?   0    How many days per week do you exercise enough to make your heart beat faster? 3 or less    How many minutes a day do you exercise enough to make your heart beat faster? 30 - 60    How many days per week do you miss taking your medication? 0         Video Start Time: 8:20AM        Review of Systems   Constitutional, HEENT, cardiovascular, pulmonary, gi and gu systems are negative, except as otherwise noted.      Objective           Vitals:  No vitals were  "obtained today due to virtual visit.    Physical Exam     GENERAL: obese, alert and no distress  EYES: Eyes grossly normal to inspection.  No discharge or erythema, or obvious scleral/conjunctival abnormalities.  RESP: No audible wheeze, cough, or visible cyanosis.  No visible retractions or increased work of breathing.    SKIN: Visible skin clear. No significant rash, abnormal pigmentation or lesions.  NEURO: Cranial nerves grossly intact.  Mentation and speech appropriate for age.  PSYCH: Mentation appears normal, affect normal/bright, judgement and insight intact, normal speech and appearance well-groomed.      No results found for this or any previous visit (from the past 24 hour(s)).        Assessment & Plan   Problem List Items Addressed This Visit     Essential hypertension, benign    Hyperlipidemia LDL goal <100 - Primary    Type 2 diabetes mellitus without complication, without long-term current use of insulin (H)    Relevant Medications    metFORMIN (GLUCOPHAGE-XR) 500 MG 24 hr tablet    dapagliflozin (FARXIGA) 10 MG TABS tablet             BMI:   Estimated body mass index is 40.75 kg/m  as calculated from the following:    Height as of 2/25/20: 1.727 m (5' 8\").    Weight as of 2/25/20: 121.6 kg (268 lb).   Weight management plan: Discussed healthy diet and exercise guidelines    Continue treatment  Keep diet and exercise   Medications refilled   Assess lab work       See Patient Instructions  Return in about 6 months (around 3/18/2021) for Physical Exam.    Ibrahima Smith MD  [facility]      Video-Visit Details    Type of service:  Video Visit    Video End Time:8:30 AM    Originating Location (pt. Location): Home    Distant Location (provider location):  [facility]     Platform used for Video Visit: Vito        "

## 2020-12-02 ENCOUNTER — MYC REFILL (OUTPATIENT)
Dept: INTERNAL MEDICINE | Facility: CLINIC | Age: 60
End: 2020-12-02

## 2020-12-02 DIAGNOSIS — E11.9 TYPE 2 DIABETES MELLITUS WITHOUT COMPLICATION, WITHOUT LONG-TERM CURRENT USE OF INSULIN (H): ICD-10-CM

## 2020-12-03 RX ORDER — SIMVASTATIN 40 MG
TABLET ORAL
Qty: 90 TABLET | Refills: 0 | Status: SHIPPED | OUTPATIENT
Start: 2020-12-03 | End: 2021-03-29

## 2020-12-04 RX ORDER — GLIPIZIDE 10 MG/1
10 TABLET, FILM COATED, EXTENDED RELEASE ORAL 2 TIMES DAILY
Qty: 180 TABLET | Refills: 1 | Status: SHIPPED | OUTPATIENT
Start: 2020-12-04 | End: 2023-01-13

## 2020-12-06 ENCOUNTER — HEALTH MAINTENANCE LETTER (OUTPATIENT)
Age: 60
End: 2020-12-06

## 2021-02-03 ENCOUNTER — TRANSFERRED RECORDS (OUTPATIENT)
Dept: HEALTH INFORMATION MANAGEMENT | Facility: CLINIC | Age: 61
End: 2021-02-03

## 2021-02-03 LAB — RETINOPATHY: NEGATIVE

## 2021-02-12 DIAGNOSIS — I10 ESSENTIAL HYPERTENSION, BENIGN: ICD-10-CM

## 2021-02-12 RX ORDER — LOSARTAN POTASSIUM 50 MG/1
TABLET ORAL
Qty: 180 TABLET | Refills: 1 | Status: SHIPPED | OUTPATIENT
Start: 2021-02-12 | End: 2021-08-16

## 2021-02-12 NOTE — TELEPHONE ENCOUNTER
Pending Prescriptions:                       Disp   Refills    losartan (COZAAR) 50 MG tablet [Pharmacy M*180 ta*1        Sig: TAKE 2 TABLETS BY MOUTH EVERY DAY    Routing refill request to provider for review/approval because:  Labs  this month, please advise

## 2021-02-28 DIAGNOSIS — I10 ESSENTIAL HYPERTENSION, BENIGN: ICD-10-CM

## 2021-03-01 NOTE — TELEPHONE ENCOUNTER
Pending Prescriptions:                       Disp   Refills    hydrochlorothiazide (HYDRODIURIL) 25 MG ta*90 tab*1        Sig: TAKE 1 TABLET BY MOUTH EVERY DAY    Routing refill request to provider for review/approval because:  Patient fails protocol    BP Readings from Last 3 Encounters:   02/25/20 138/80   07/05/19 128/70   04/05/19 122/76

## 2021-03-02 RX ORDER — HYDROCHLOROTHIAZIDE 25 MG/1
TABLET ORAL
Qty: 90 TABLET | Refills: 1 | Status: SHIPPED | OUTPATIENT
Start: 2021-03-02 | End: 2021-10-04

## 2021-03-14 DIAGNOSIS — E11.9 TYPE 2 DIABETES MELLITUS WITHOUT COMPLICATION, WITHOUT LONG-TERM CURRENT USE OF INSULIN (H): ICD-10-CM

## 2021-03-15 RX ORDER — DAPAGLIFLOZIN 10 MG/1
TABLET, FILM COATED ORAL
Qty: 90 TABLET | Refills: 0 | Status: SHIPPED | OUTPATIENT
Start: 2021-03-15 | End: 2021-06-11

## 2021-03-15 RX ORDER — METFORMIN HCL 500 MG
TABLET, EXTENDED RELEASE 24 HR ORAL
Qty: 360 TABLET | Refills: 0 | Status: SHIPPED | OUTPATIENT
Start: 2021-03-15 | End: 2021-06-14

## 2021-03-15 NOTE — TELEPHONE ENCOUNTER
"Requested Prescriptions   Pending Prescriptions Disp Refills     metFORMIN (GLUCOPHAGE-XR) 500 MG 24 hr tablet [Pharmacy Med Name: METFORMIN HCL  MG TABLET] 360 tablet 1     Sig: TAKE 4 TABLETS BY MOUTH EVERY DAY WITH DINNER       Biguanide Agents Failed - 3/14/2021  9:46 AM        Failed - Patient has documented A1c within the specified period of time.     If HgbA1C is 8 or greater, it needs to be on file within the past 3 months.  If less than 8, must be on file within the past 6 months.     Recent Labs   Lab Test 02/25/20  0907   A1C 8.4*             Failed - Patient's CR is NOT>1.4 OR Patient's EGFR is NOT<45 within past 12 mos.     Recent Labs   Lab Test 02/25/20  0907   GFRESTIMATED >90   GFRESTBLACK >90       Recent Labs   Lab Test 02/25/20  0907   CR 0.91             Passed - Patient is age 10 or older        Passed - Patient does NOT have a diagnosis of CHF.        Passed - Medication is active on med list        Passed - Recent (6 mo) or future (30 days) visit within the authorizing provider's specialty     Patient had office visit in the last 6 months or has a visit in the next 30 days with authorizing provider or within the authorizing provider's specialty.  See \"Patient Info\" tab in inbasket, or \"Choose Columns\" in Meds & Orders section of the refill encounter.               FARXIGA 10 MG TABS tablet [Pharmacy Med Name: FARXIGA 10 MG TABLET] 90 tablet 1     Sig: TAKE 1 TABLET BY MOUTH DAILY       Sodium Glucose Co-Transport Inhibitor Agents Failed - 3/14/2021  9:46 AM        Failed - Patient has documented A1c within the specified period of time.     If HgbA1C is 8 or greater, it needs to be on file within the past 3 months.  If less than 8, must be on file within the past 6 months.     Recent Labs   Lab Test 02/25/20  0907   A1C 8.4*             Failed - No creatinine >1.4 or GFR <45 within the past 12 mos     Recent Labs   Lab Test 02/25/20  0907   GFRESTIMATED >90   GFRESTBLACK >90       Recent " "Labs   Lab Test 02/25/20  0907   CR 0.91             Failed - Patient has documented normal Potassium within the last 12 mos.     Recent Labs   Lab Test 02/25/20  0907   POTASSIUM 4.3             Passed - Medication is active on med list        Passed - Patient is age 18 or older        Passed - Recent (6 mo) or future (30 days) visit within the authorizing provider's specialty     Patient had office visit in the last 6 months or has a visit in the next 30 days with authorizing provider or within the authorizing provider's specialty.  See \"Patient Info\" tab in inbasket, or \"Choose Columns\" in Meds & Orders section of the refill encounter.                 "

## 2021-03-15 NOTE — TELEPHONE ENCOUNTER
Medication is being filled for 1 time refill only due to:  patient is due for an appointment     [employer] message sent informing patient.

## 2021-03-17 DIAGNOSIS — E11.9 TYPE 2 DIABETES MELLITUS WITHOUT COMPLICATION, WITHOUT LONG-TERM CURRENT USE OF INSULIN (H): ICD-10-CM

## 2021-03-18 RX ORDER — PIOGLITAZONEHYDROCHLORIDE 45 MG/1
TABLET ORAL
Qty: 30 TABLET | Refills: 0 | Status: SHIPPED | OUTPATIENT
Start: 2021-03-18 | End: 2021-04-14

## 2021-03-18 NOTE — TELEPHONE ENCOUNTER
Pending Prescriptions:                       Disp   Refills    pioglitazone (ACTOS) 45 MG tablet [Pharmac*90 tab*3        Sig: TAKE 1 TABLET BY MOUTH EVERY DAY    Routing refill request to provider for review/approval because:  Patient fails protocol

## 2021-04-11 ENCOUNTER — HEALTH MAINTENANCE LETTER (OUTPATIENT)
Age: 61
End: 2021-04-11

## 2021-04-11 DIAGNOSIS — E11.9 TYPE 2 DIABETES MELLITUS WITHOUT COMPLICATION, WITHOUT LONG-TERM CURRENT USE OF INSULIN (H): ICD-10-CM

## 2021-04-14 RX ORDER — PIOGLITAZONEHYDROCHLORIDE 45 MG/1
TABLET ORAL
Qty: 30 TABLET | Refills: 0 | Status: SHIPPED | OUTPATIENT
Start: 2021-04-14 | End: 2021-05-13

## 2021-04-14 NOTE — TELEPHONE ENCOUNTER
Routing refill request to provider for review/approval because:  Adisy given x1 and patient did not follow up, please advise  John Child RN, BSN

## 2021-04-21 ENCOUNTER — OFFICE VISIT (OUTPATIENT)
Dept: INTERNAL MEDICINE | Facility: CLINIC | Age: 61
End: 2021-04-21
Payer: COMMERCIAL

## 2021-04-21 VITALS
TEMPERATURE: 98.8 F | HEIGHT: 68 IN | OXYGEN SATURATION: 96 % | BODY MASS INDEX: 39.4 KG/M2 | WEIGHT: 260 LBS | DIASTOLIC BLOOD PRESSURE: 80 MMHG | HEART RATE: 88 BPM | SYSTOLIC BLOOD PRESSURE: 122 MMHG

## 2021-04-21 DIAGNOSIS — Z00.00 ENCOUNTER FOR PREVENTATIVE ADULT HEALTH CARE EXAMINATION: ICD-10-CM

## 2021-04-21 DIAGNOSIS — E66.01 MORBID OBESITY (H): ICD-10-CM

## 2021-04-21 DIAGNOSIS — I10 ESSENTIAL HYPERTENSION, BENIGN: ICD-10-CM

## 2021-04-21 DIAGNOSIS — E11.9 TYPE 2 DIABETES MELLITUS WITHOUT COMPLICATION, WITHOUT LONG-TERM CURRENT USE OF INSULIN (H): ICD-10-CM

## 2021-04-21 DIAGNOSIS — I10 ESSENTIAL HYPERTENSION, BENIGN: Primary | ICD-10-CM

## 2021-04-21 DIAGNOSIS — Z12.5 SCREENING FOR PROSTATE CANCER: ICD-10-CM

## 2021-04-21 DIAGNOSIS — J45.20 MILD INTERMITTENT ASTHMA WITHOUT COMPLICATION: ICD-10-CM

## 2021-04-21 DIAGNOSIS — R07.9 CHEST PAIN, UNSPECIFIED TYPE: Primary | ICD-10-CM

## 2021-04-21 DIAGNOSIS — E78.5 HYPERLIPIDEMIA LDL GOAL <100: ICD-10-CM

## 2021-04-21 DIAGNOSIS — M10.071 ACUTE IDIOPATHIC GOUT OF RIGHT ANKLE: ICD-10-CM

## 2021-04-21 LAB
ALBUMIN SERPL-MCNC: 3.7 G/DL (ref 3.4–5)
ALBUMIN UR-MCNC: NEGATIVE MG/DL
ALP SERPL-CCNC: 67 U/L (ref 40–150)
ALT SERPL W P-5'-P-CCNC: 22 U/L (ref 0–70)
ANION GAP SERPL CALCULATED.3IONS-SCNC: 7 MMOL/L (ref 3–14)
APPEARANCE UR: CLEAR
AST SERPL W P-5'-P-CCNC: 21 U/L (ref 0–45)
BILIRUB SERPL-MCNC: 0.4 MG/DL (ref 0.2–1.3)
BILIRUB UR QL STRIP: NEGATIVE
BUN SERPL-MCNC: 21 MG/DL (ref 7–30)
CALCIUM SERPL-MCNC: 8.7 MG/DL (ref 8.5–10.1)
CHLORIDE SERPL-SCNC: 102 MMOL/L (ref 94–109)
CHOLEST SERPL-MCNC: 181 MG/DL
CO2 SERPL-SCNC: 25 MMOL/L (ref 20–32)
COLOR UR AUTO: YELLOW
CREAT SERPL-MCNC: 1 MG/DL (ref 0.66–1.25)
CREAT UR-MCNC: 98 MG/DL
ERYTHROCYTE [DISTWIDTH] IN BLOOD BY AUTOMATED COUNT: 14.1 % (ref 10–15)
GFR SERPL CREATININE-BSD FRML MDRD: 82 ML/MIN/{1.73_M2}
GLUCOSE SERPL-MCNC: 191 MG/DL (ref 70–99)
GLUCOSE UR STRIP-MCNC: >=1000 MG/DL
HBA1C MFR BLD: 10.2 % (ref 0–5.6)
HCT VFR BLD AUTO: 41.7 % (ref 40–53)
HDLC SERPL-MCNC: 50 MG/DL
HGB BLD-MCNC: 14 G/DL (ref 13.3–17.7)
HGB UR QL STRIP: NEGATIVE
KETONES UR STRIP-MCNC: NEGATIVE MG/DL
LDLC SERPL CALC-MCNC: ABNORMAL MG/DL
LDLC SERPL DIRECT ASSAY-MCNC: 86 MG/DL
LEUKOCYTE ESTERASE UR QL STRIP: NEGATIVE
MCH RBC QN AUTO: 28.9 PG (ref 26.5–33)
MCHC RBC AUTO-ENTMCNC: 33.6 G/DL (ref 31.5–36.5)
MCV RBC AUTO: 86 FL (ref 78–100)
MICROALBUMIN UR-MCNC: 12 MG/L
MICROALBUMIN/CREAT UR: 12.58 MG/G CR (ref 0–17)
NITRATE UR QL: NEGATIVE
NONHDLC SERPL-MCNC: 131 MG/DL
PH UR STRIP: 5 PH (ref 5–7)
PLATELET # BLD AUTO: 257 10E9/L (ref 150–450)
POTASSIUM SERPL-SCNC: 3.8 MMOL/L (ref 3.4–5.3)
PROT SERPL-MCNC: 7.1 G/DL (ref 6.8–8.8)
PSA SERPL-ACNC: 0.33 UG/L (ref 0–4)
RBC # BLD AUTO: 4.84 10E12/L (ref 4.4–5.9)
SODIUM SERPL-SCNC: 134 MMOL/L (ref 133–144)
SOURCE: ABNORMAL
SP GR UR STRIP: 1.01 (ref 1–1.03)
TRIGL SERPL-MCNC: 439 MG/DL
TSH SERPL DL<=0.005 MIU/L-ACNC: 3.86 MU/L (ref 0.4–4)
UROBILINOGEN UR STRIP-ACNC: 0.2 EU/DL (ref 0.2–1)
WBC # BLD AUTO: 7.9 10E9/L (ref 4–11)

## 2021-04-21 PROCEDURE — 83721 ASSAY OF BLOOD LIPOPROTEIN: CPT | Mod: XU | Performed by: INTERNAL MEDICINE

## 2021-04-21 PROCEDURE — 81003 URINALYSIS AUTO W/O SCOPE: CPT | Performed by: INTERNAL MEDICINE

## 2021-04-21 PROCEDURE — 85027 COMPLETE CBC AUTOMATED: CPT | Performed by: INTERNAL MEDICINE

## 2021-04-21 PROCEDURE — 80061 LIPID PANEL: CPT | Performed by: INTERNAL MEDICINE

## 2021-04-21 PROCEDURE — 80053 COMPREHEN METABOLIC PANEL: CPT | Performed by: INTERNAL MEDICINE

## 2021-04-21 PROCEDURE — G0103 PSA SCREENING: HCPCS | Performed by: INTERNAL MEDICINE

## 2021-04-21 PROCEDURE — 83036 HEMOGLOBIN GLYCOSYLATED A1C: CPT | Performed by: INTERNAL MEDICINE

## 2021-04-21 PROCEDURE — 82043 UR ALBUMIN QUANTITATIVE: CPT | Performed by: INTERNAL MEDICINE

## 2021-04-21 PROCEDURE — 36415 COLL VENOUS BLD VENIPUNCTURE: CPT | Performed by: INTERNAL MEDICINE

## 2021-04-21 PROCEDURE — 99396 PREV VISIT EST AGE 40-64: CPT | Performed by: INTERNAL MEDICINE

## 2021-04-21 PROCEDURE — 99213 OFFICE O/P EST LOW 20 MIN: CPT | Mod: 25 | Performed by: INTERNAL MEDICINE

## 2021-04-21 PROCEDURE — 84443 ASSAY THYROID STIM HORMONE: CPT | Performed by: INTERNAL MEDICINE

## 2021-04-21 RX ORDER — ALBUTEROL SULFATE 90 UG/1
2 AEROSOL, METERED RESPIRATORY (INHALATION) EVERY 6 HOURS
Qty: 18 G | Refills: 3 | Status: SHIPPED | OUTPATIENT
Start: 2021-04-21 | End: 2023-01-13

## 2021-04-21 RX ORDER — INDOMETHACIN 50 MG/1
50 CAPSULE ORAL 2 TIMES DAILY WITH MEALS
Qty: 30 CAPSULE | Refills: 0 | Status: SHIPPED | OUTPATIENT
Start: 2021-04-21 | End: 2023-01-13

## 2021-04-21 ASSESSMENT — ENCOUNTER SYMPTOMS
ARTHRALGIAS: 0
CONSTIPATION: 0
HEARTBURN: 0
ABDOMINAL PAIN: 0
EYE PAIN: 0
WEAKNESS: 0
SHORTNESS OF BREATH: 0
NAUSEA: 0
FREQUENCY: 0
HEMATOCHEZIA: 0
JOINT SWELLING: 0
CHILLS: 0
DIARRHEA: 0
DIZZINESS: 0
DYSURIA: 0
PARESTHESIAS: 0
SORE THROAT: 0
NERVOUS/ANXIOUS: 0
PALPITATIONS: 0
COUGH: 0
HEMATURIA: 0
HEADACHES: 0
MYALGIAS: 0
FEVER: 0

## 2021-04-21 ASSESSMENT — MIFFLIN-ST. JEOR: SCORE: 1963.85

## 2021-04-21 NOTE — PROGRESS NOTES
SUBJECTIVE:   CC: Garrick Espinosa is an 60 year old male who presents for preventative health visit.       Patient has been advised of split billing requirements and indicates understanding: Yes  Healthy Habits:     Getting at least 3 servings of Calcium per day:  Yes    Bi-annual eye exam:  Yes    Dental care twice a year:  Yes    Sleep apnea or symptoms of sleep apnea:  None    Diet:  Low salt    Frequency of exercise:  2-3 days/week    Duration of exercise:  15-30 minutes    Taking medications regularly:  Yes    Medication side effects:  None    PHQ-2 Total Score: 0    Additional concerns today:  Yes          PROBLEMS TO ADD ON...  Has h/o HTN. on medical treatment. BP has been controlled. No side effects from medications. No CP, HA, dizziness. good compliance with medications and low salt diet.  Has H/O hyperlipidemia. On medical treatment and diet. No side effects. No muscle weakness, myalgias or upset stomach.   Has H/O DM. On diet , exercise and oral treatment. Blood sugars are not well controlled. No parestesias. No hypoglycemias.  Has h/o gout, reports increased pain in the right ankle and mild swelling.   Concern for occasional chest pain, left anterior     Today's PHQ-2 Score:   PHQ-2 ( 1999 [employer]) 4/21/2021   Q1: Little interest or pleasure in doing things 0   Q2: Feeling down, depressed or hopeless 0   PHQ-2 Score 0   Q1: Little interest or pleasure in doing things Not at all   Q2: Feeling down, depressed or hopeless Not at all   PHQ-2 Score 0       Abuse: Current or Past(Physical, Sexual or Emotional)- No  Do you feel safe in your environment? Yes    Have you ever done Advance Care Planning? (For example, a Health Directive, POLST, or a discussion with a medical provider or your loved ones about your wishes): Yes, patient states has an Advance Care Planning document and will bring a copy to the clinic.    Social History     Tobacco Use     Smoking status: Never Smoker     Smokeless tobacco: Never Used  "  Substance Use Topics     Alcohol use: Yes     Comment: Occ     If you drink alcohol do you typically have >3 drinks per day or >7 drinks per week? No    Alcohol Use 4/21/2021   Prescreen: >3 drinks/day or >7 drinks/week? No   Prescreen: >3 drinks/day or >7 drinks/week? -       Last PSA:   PSA   Date Value Ref Range Status   02/25/2020 0.44 0 - 4 ug/L Final     Comment:     Assay Method:  Chemiluminescence using Siemens Vista analyzer       Reviewed orders with patient. Reviewed health maintenance and updated orders accordingly - Yes  Lab work is in process  Labs reviewed in EPIC    Reviewed and updated as needed this visit by clinical staff  Tobacco  Allergies  Meds  Problems  Med Hx  Surg Hx  Fam Hx  Soc Hx          Reviewed and updated as needed this visit by Provider  Tobacco  Allergies  Meds  Problems  Med Hx  Surg Hx  Fam Hx             Review of Systems   Constitutional: Negative for chills and fever.   HENT: Negative for congestion, ear pain, hearing loss and sore throat.    Eyes: Negative for pain and visual disturbance.   Respiratory: Negative for cough and shortness of breath.    Cardiovascular: Negative for chest pain, palpitations and peripheral edema.   Gastrointestinal: Negative for abdominal pain, constipation, diarrhea, heartburn, hematochezia and nausea.   Genitourinary: Negative for discharge, dysuria, frequency, genital sores, hematuria, impotence and urgency.   Musculoskeletal: Negative for arthralgias, joint swelling and myalgias.   Skin: Negative for rash.   Neurological: Negative for dizziness, weakness, headaches and paresthesias.   Psychiatric/Behavioral: Negative for mood changes. The patient is not nervous/anxious.          OBJECTIVE:   /80 (BP Location: Right arm, Patient Position: Sitting, Cuff Size: Adult Large)   Pulse 88   Temp 98.8  F (37.1  C) (Oral)   Ht 1.727 m (5' 8\")   Wt 117.9 kg (260 lb)   SpO2 96%   BMI 39.53 kg/m      Physical Exam  GENERAL: " obese, alert and no distress  EYES: Eyes grossly normal to inspection, PERRL and conjunctivae and sclerae normal  HENT: ear canals and TM's normal, nose and mouth without ulcers or lesions  NECK: no adenopathy, no asymmetry, masses, or scars and thyroid normal to palpation  RESP: lungs clear to auscultation - no rales, rhonchi or wheezes  CV: regular rate and rhythm, normal S1 S2, no S3 or S4, no murmur, click or rub, no peripheral edema and peripheral pulses strong  ABDOMEN: soft, nontender, no hepatosplenomegaly, no masses and bowel sounds normal  MS: no gross musculoskeletal defects noted, no edema, right ankle mild edema , erythema in the medial malleolus and palpatory tenderness  SKIN: no suspicious lesions or rashes  NEURO: Normal strength and tone, mentation intact and speech normal  PSYCH: mentation appears normal, affect normal/bright    Diagnostic Test Results:  Labs reviewed in Epic    ASSESSMENT/PLAN:       ICD-10-CM    1. Chest pain, unspecified type  R07.9 Echocardiogram Exercise Stress   2. Acute idiopathic gout of right ankle  M10.071 indomethacin (INDOCIN) 50 MG capsule     OFFICE/OUTPT VISIT,EST,LEVL III   3. Mild intermittent asthma without complication  J45.20 albuterol (PROAIR HFA/PROVENTIL HFA/VENTOLIN HFA) 108 (90 Base) MCG/ACT inhaler     OFFICE/OUTPT VISIT,EST,LEVL III   4. Type 2 diabetes mellitus without complication, without long-term current use of insulin (H)  E11.9 NUTRITION REFERRAL     OFFICE/OUTPT VISIT,EST,LEVL III   5. Morbid obesity (H)  E66.01 NUTRITION REFERRAL     OFFICE/OUTPT VISIT,EST,LEVL III   6. Encounter for preventative adult health care examination  Z00.00    7. Essential hypertension, benign  I10 OFFICE/OUTPT VISIT,EST,LEVL III   8. Hyperlipidemia LDL goal <100  E78.5 OFFICE/OUTPT VISIT,EST,LEVL III     Assess lab  Refer for stress test  Nutritionist referral   Form for [facility] filled in    Patient has been advised of split billing requirements and indicates  "understanding: Yes  COUNSELING:   Reviewed preventive health counseling, as reflected in patient instructions       Regular exercise       Healthy diet/nutrition       Vision screening       Hearing screening       Colon cancer screening       Prostate cancer screening    Estimated body mass index is 39.53 kg/m  as calculated from the following:    Height as of this encounter: 1.727 m (5' 8\").    Weight as of this encounter: 117.9 kg (260 lb).     Weight management plan: Discussed healthy diet and exercise guidelines    He reports that he has never smoked. He has never used smokeless tobacco.      Counseling Resources:  ATP IV Guidelines  Pooled Cohorts Equation Calculator  FRAX Risk Assessment  ICSI Preventive Guidelines  Dietary Guidelines for Americans, 2010  USDA's MyPlate  ASA Prophylaxis  Lung CA Screening    Ibrahima Smith MD  [facility]  "

## 2021-04-22 ASSESSMENT — ASTHMA QUESTIONNAIRES: ACT_TOTALSCORE: 23

## 2021-04-27 ENCOUNTER — TELEPHONE (OUTPATIENT)
Dept: INTERNAL MEDICINE | Facility: CLINIC | Age: 61
End: 2021-04-27

## 2021-04-27 DIAGNOSIS — E11.9 TYPE 2 DIABETES MELLITUS WITHOUT COMPLICATION, WITHOUT LONG-TERM CURRENT USE OF INSULIN (H): ICD-10-CM

## 2021-04-27 RX ORDER — DULAGLUTIDE 0.75 MG/.5ML
0.75 INJECTION, SOLUTION SUBCUTANEOUS
Qty: 2 ML | Refills: 3 | Status: SHIPPED | OUTPATIENT
Start: 2021-04-27 | End: 2021-08-13

## 2021-04-27 RX ORDER — DULAGLUTIDE 0.75 MG/.5ML
0.75 INJECTION, SOLUTION SUBCUTANEOUS
Qty: 0.5 ML | Refills: 3 | Status: SHIPPED | OUTPATIENT
Start: 2021-04-27 | End: 2021-04-27

## 2021-04-27 NOTE — TELEPHONE ENCOUNTER
Message from pharmacy stating this medication comes in a box of four pens. Please resend new rx for this if possible.

## 2021-05-10 ENCOUNTER — HOSPITAL ENCOUNTER (OUTPATIENT)
Dept: CARDIOLOGY | Facility: CLINIC | Age: 61
Discharge: HOME | End: 2021-05-10
Attending: INTERNAL MEDICINE | Admitting: INTERNAL MEDICINE
Payer: COMMERCIAL

## 2021-05-10 DIAGNOSIS — R07.9 CHEST PAIN, UNSPECIFIED TYPE: ICD-10-CM

## 2021-05-10 PROCEDURE — 93321 DOPPLER ECHO F-UP/LMTD STD: CPT | Mod: TC

## 2021-05-10 PROCEDURE — 93018 CV STRESS TEST I&R ONLY: CPT | Performed by: INTERNAL MEDICINE

## 2021-05-10 PROCEDURE — 93321 DOPPLER ECHO F-UP/LMTD STD: CPT | Mod: 26 | Performed by: INTERNAL MEDICINE

## 2021-05-10 PROCEDURE — 93016 CV STRESS TEST SUPVJ ONLY: CPT | Performed by: INTERNAL MEDICINE

## 2021-05-10 PROCEDURE — 93350 STRESS TTE ONLY: CPT | Mod: 26 | Performed by: INTERNAL MEDICINE

## 2021-05-10 PROCEDURE — 93325 DOPPLER ECHO COLOR FLOW MAPG: CPT | Mod: 26 | Performed by: INTERNAL MEDICINE

## 2021-05-10 PROCEDURE — 255N000002 HC RX 255 OP 636: Performed by: INTERNAL MEDICINE

## 2021-05-10 RX ADMIN — HUMAN ALBUMIN MICROSPHERES AND PERFLUTREN 3 ML: 10; .22 INJECTION, SOLUTION INTRAVENOUS at 09:33

## 2021-05-13 DIAGNOSIS — E11.9 TYPE 2 DIABETES MELLITUS WITHOUT COMPLICATION, WITHOUT LONG-TERM CURRENT USE OF INSULIN (H): ICD-10-CM

## 2021-05-13 RX ORDER — PIOGLITAZONEHYDROCHLORIDE 45 MG/1
TABLET ORAL
Qty: 90 TABLET | Refills: 2 | Status: SHIPPED | OUTPATIENT
Start: 2021-05-13 | End: 2022-03-02

## 2021-05-13 NOTE — TELEPHONE ENCOUNTER
Pending Prescriptions:                       Disp   Refills    pioglitazone (ACTOS) 45 MG tablet [Pharma*90 tab*2            Sig: TAKE 1 TABLET BY MOUTH EVERY DAY    Prescription approved per [facility] Refill Protocol.

## 2021-06-08 ENCOUNTER — VIRTUAL VISIT (OUTPATIENT)
Dept: NUTRITION | Facility: CLINIC | Age: 61
End: 2021-06-08
Attending: INTERNAL MEDICINE
Payer: COMMERCIAL

## 2021-06-08 DIAGNOSIS — E66.01 MORBID OBESITY (H): Primary | ICD-10-CM

## 2021-06-08 DIAGNOSIS — E11.9 TYPE 2 DIABETES MELLITUS WITHOUT COMPLICATION, WITHOUT LONG-TERM CURRENT USE OF INSULIN (H): ICD-10-CM

## 2021-06-08 PROCEDURE — G0108 DIAB MANAGE TRN  PER INDIV: HCPCS | Mod: GT

## 2021-06-08 NOTE — PROGRESS NOTES
Medical Nutrition Therapy  Visit Type:Initial assessment and intervention    Garrick Espinosa presents today for MNT and education related to type 2 diabetes and weight management.   He is accompanied by self.     Type of service:  Video Visit    If the video visit is dropped, the video visit invitation should be resent by: Other e-mail: [email or web address]    Originating Location (pt. Location): Home  Distant Location (provider location): Home  Mode of Communication:  Video Conference via Virtual Web    Video Start Time: 9:30  Video End Time (time video stopped): 10:05    How would patient like to obtain AVS? Minnie    ASSESSMENT:   Patient comments/concerns relating to nutrition: would like to work on healthy eating,  His wife also has diabetes and is hoping to make healthy changes to benefit his family.   Blood sugar: 200's fasting    NUTRITION HISTORY:    Breakfast: wake up around 6:15, 6:30- cereal (cheerios or raisin bran)  with milk, sometimes with banana OR weekend- eggs, georges, hash browns, toast OR waffles, pancakes -- sometimes eating 11 am on weekend.   Lunch: sandwich and chips + soda OR soups and soda   Dinner: 5:00-5:30 pm: tacos -- tortilla with hamburger meat, hot sauce, salsa (2 tacos)  OR pot Pies -- usually pre-made foods OR burgers or hot dogs on the grill OR spaghetti  Snacks: cereal bar or pistachios, trail mix  Beverages:water, vitamin water, diet soda, occasionally beer    Vegetebltes: corn, green beans, salad  Fruits: apples, oranges, grapes, bananas    Misses meals? Often skips lunch during the weekend  Eats out:  1-2 meals/per week - Marciano barahona Leann Chin    Previous diet education:  Yes     Food allergies/intolerances: none    EXERCISE: walking at lunch time- 45 minutes    SOCIO/ECONOMIC:   Lives with: self, spouse and 2 teenagers    MEDICATIONS:  Current Outpatient Medications   Medication     albuterol (PROAIR HFA/PROVENTIL HFA/VENTOLIN HFA) 108 (90 Base) MCG/ACT inhaler     ASPIRIN PO      blood glucose calibration (NO BRAND SPECIFIED) solution     blood glucose monitoring (NO BRAND SPECIFIED) test strip     blood glucose monitoring (ONE TOUCH ULTRA 2) meter device kit     doxylamine (UNISOM) 25 MG TABS tablet     dulaglutide (TRULICITY) 0.75 MG/0.5ML pen     FARXIGA 10 MG TABS tablet     glipiZIDE (GLUCOTROL XL) 10 MG 24 hr tablet     hydrochlorothiazide (HYDRODIURIL) 25 MG tablet     indomethacin (INDOCIN) 50 MG capsule     Lancet Devices (SIMPLE DIAGNOSTICS LANCING DEV) MISC     losartan (COZAAR) 50 MG tablet     MELATONIN PO     metFORMIN (GLUCOPHAGE-XR) 500 MG 24 hr tablet     MULTI-DAY VITAMINS OR TABS     pioglitazone (ACTOS) 45 MG tablet     simvastatin (ZOCOR) 40 MG tablet     Thiamine HCl (VITAMIN B-1 PO)     thin (NO BRAND SPECIFIED) lancets     triamcinolone (KENALOG) 0.1 % external cream     TYLENOL EXTRA STRENGTH 500 MG PO TABS     No current facility-administered medications for this visit.        LABS:  Last Basic Metabolic Panel:  Lab Results   Component Value Date     04/21/2021      Lab Results   Component Value Date    POTASSIUM 3.8 04/21/2021     Lab Results   Component Value Date    CHLORIDE 102 04/21/2021     Lab Results   Component Value Date    ANTOINETTE 8.7 04/21/2021     Lab Results   Component Value Date    CO2 25 04/21/2021     Lab Results   Component Value Date    BUN 21 04/21/2021     Lab Results   Component Value Date    CR 1.00 04/21/2021     Lab Results   Component Value Date     04/21/2021       ANTHROPOMETRICS:  Vitals: There were no vitals taken for this visit.  There is no height or weight on file to calculate BMI.      Wt Readings from Last 5 Encounters:   04/21/21 117.9 kg (260 lb)   02/25/20 121.6 kg (268 lb)   07/05/19 119.3 kg (263 lb)   04/05/19 122.4 kg (269 lb 14.4 oz)   03/29/19 117.9 kg (260 lb)       Weight Change: stable  Down to 252    NUTRITION DIAGNOSIS: Food- and nutrition-related knowledge deficit related to patient's questions as  evidenced by patient statements and new referral.    NUTRITION INTERVENTION:  Education given to support: general nutrition guidelines, weight reduction, consistent meals, carb counting, labeling, fat modification, exercise, fiber, behavior modification, portion control and heart healthy diet  Education Materials Provided: My Plate Planner/Choose My Plate and Healthy Living with diabetes.  Motivational Interviewing    PATIENT'S BEHAVIOR CHANGE GOALS:   See Patient Instructions for patient stated behavior change goals. AVS was printed and given to patient at today's appointment.    MONITOR / EVALUATE:  RD will monitor/evaluate:  Beliefs and attitudes related to food  Progress toward meeting stated nutrition-related goals  Readiness to change nutrition-related behaviors  Weight change    FOLLOW-UP:  Follow up with RD as needed.  Call RD with questions/concerns.     Marlyn Foley, HEATHER, LD, CDE    Time spent in minutes: 35  Encounter: Individual

## 2021-06-08 NOTE — PATIENT INSTRUCTIONS
Simple Tips for Quick and Balanced Meals    4 Components of a Balanced Meal  - Protein (P)  - Carbohydrate (starch/grain, fruit, milk/yogurt) (C)  - Fat (F)  - Vegetable (V)    Protein (P) lean sources preferred   - Poultry (chicken/turkey)  - Ham  - Pork (sausage, georges, chop, ground)  - Beef  - Eggs  - Cheese/Cottage Cheese   - Tofu (Vegetarian/Vegan)  - Tempeh (Vegetarian/Vegan)  - Edamame (Vegetarian/Vegan)  - Soy (Vegetarian/Vegan)    Carbohydrate (C) whole grains preferred   - Bread   - Pasta/Rice/Barley/Couscous/etc.  - Quinoa *high in protein  - Corn  - Peas  - Beans/Lentils *high in protein  - Fruit  - Milk *high in protein  - Yogurt *Greek yogurt is higher in protein    Fat (F)  - Oils  - Butter/Margarine  - Salad Dressing  - Cream Cheese  - Nuts/Seeds/Nut [place]  - Avocado  - Coconut  - Hummus    Vegetable (V)  - Many  (note- corn, peas, potatoes, lentils and winter squash are carbohydrate sources)          Tips    1. Every meal should have 3-4 components (ideally all 4).  Choose 1 from each category to make your meal complete  Aim for vegetables with every meal and try to have fruit/milk/yogurt at least 3 times/day.  2. Prepping 1+ components ahead of times can speed up meal prep (see below)    Wash & chop your produce in bulk once weekly for easy daily consumption    Set aside a portion of one day/week to allow for meal prep. Consider using the crock-pot, stovetop and oven simultaneously for batch cooking.  3. Spices and seasonings should be added for taste as desired. Those with salt can raise blood pressure and are not considered  free foods  as most other spices/herbs are     Pre-Cooking    Protein  - Bake in oven or crock pot ahead of time and refrigerate for use throughout week (chicken, turkey, beef, etc.)  - Buy pre-diced/pre-cooked meat OR deli meat to add to meals (chicken, turkey, ham) recommended to limit consumption of processed meats  - Buy pre-cooked frozen meat to decrease cooking time  (Ex- [place] pre-cooked chicken strips)  - Consider rotisserie chicken or turkey as an occasional option for ready to eat protein. this option is higher in sodium and may be higher in fat  - Make batch of hard boiled eggs and refrigerate  - Add shredded or sliced cheese to meals cheese is higher in calories/fat  - Buy frozen steamer bag of edamame and use microwave to increase speed              Carbohydrate    - Cook large pot of rice/quinoa/pasta and refrigerate for weekly consumption. Reheat before serving. Rinse pasta with cold water before storing to decrease starch adherence of noodles  - Cook lentils from bag ahead of time and refrigerate for weekly consumption  - Have stock of canned corn, peas, and beans. Consider buying no salt added or low sodium. Always rinse them off with tap water before cooking to decrease salt consumption  - Avoid canned fruit that is in heavy syrup. Try to find canned fruit in water, light syrup, or own juice. *Note- canned fruit that is  no sugar added  still have natural sugar and often contain a sugar substitute  - Wash/cut produce in bulk once a week to allow for easy consumption during the week  - Cooking potatoes in microwave is quickest method. Cooking potatoes in crockpot is longest method. Both require little prep.  - Cow s milk and soymilk are high in protein. Nut and rice milks are not. Watch out for added sugars in non-dairy milk. They may vary in carbohydrate amount if not cow/soy milk    Fat    - Require very little prep. Be sure to always have staples on hand (olive, canola oil, nut butters)  - Do not omit as fat promotes satiety  - Limit to 1-2 servings at meals as fats contain more calories than any other macronutrient       Vegetables    - Wash/cut produce in bulk once/week to allow for easy consumption during the week  - Consider purchasing frozen steam vegetables to microwave and add to meals. Avoid ones with added sauces as they may be high in  salt/fat  - Fresh/Frozen are typically free of added salt. Rinse out canned vegetables prior to consuming to get rid of excess salt, or buy no salt added canned vegetables  - If your budget allows, buy pre-chopped fruits/vegetables to save time on prep  - Have them at EVERY meal!      How to add components to everyday meals (Examples)    - Macaroni and cheese (C, F) add pre-baked diced chicken (P) and steamed green beans (V)    - Frozen Pizza (C, P, F) add side salad with extra protein OR steamed vegetable    - Deli Meat [place] (C, P) add cheese (P,F) ,vegetables (V) and condiments as desired (F)    - Frozen meal (Lean Cuisine/Healthy Choice/etc (C, P, F) add protein and vegetable as many of these meals are not sufficient sources of these       Simple Meal Combinations    3 oz [place] (baked or smoked) (3P) + 2/3 cup rice (2C) + 1 tsp Olive oil or butter (1F) +   plate Green Beans (V)      3 oz lean ground beef with taco seasoning (3P) + 1-2 Taco Shells (1-2C) +  oz Cheese (1F or 1P) + 1 tbsp Sour Cream (1F) + Fixings (V)      Grilled chicken salad: car lettuce (V)+ 2-3 oz chicken breast (2-3P) + 1 Tbsp Caesar dressing (1-2F) + 1 cup strawberries (1C) + 8 oz milk (1C)       4-5 Meatballs-frozen/pre-cooked (3P) + 2/3 cup Pasta (2C) +   cup Spaghetti Sauce (1C +1 V) + Side salad (V)      Deli Meat [place]: 2 slices whole grain bread (2C) + 2 oz Turkey breast (2P) + 1 slice cheese (1F or 1P) + lettuce/tomato/onion (V) + 1 small apple (1C)       3 oz Pork Chops (grilled) (3P) + 2/3 cup Quinoa (2C) + Asparagus in 1-2 tsp Olive Oil (1V,1F)      1-2 hard-boiled eggs (1-2P) + 6 oz yogurt (1C) + handful of mixed nuts (1F) +   cup peaches (1C)       1-2 eggs (1-2P) + 1-2 slices whole grain toast (1-2C) + 1 tbsp peanut butter (1F)        of a medium potato or 1 small sweet potato (1C) + 1-2 fried eggs (or 2 oz meat) (2P) + vegetables (V) (spinach, mushrooms, onions, peppers, etc.), sautéed in olive oil (1-2F)      3-4 oz  lean ground turkey (3-4P) +   to 1 cup cubed sweet potatoes (1-2C) + vegetables (V)       1 banana (2C) + 1 Tbsp peanut butter (1F) +   cup cottage cheese (2P)                     Snack Ideas for your Meal Plan     Protein (1 serving = 6-8 grams of protein each)    Beef Jerky ( 2 pieces)    Beef Sticks, plain (1 stick)    Cheese/Cheese Stick (1 oz)    Chicken breast (1 oz)    Cottage cheese, low fat (1/4 cup)    Crab, Imitation (2 oz)    Deli Meat (2 oz)    Edamame, boiled (1/2 cup)    Egg, hardboiled (1)    Shrimp, small (10 pieces)     Turkey Breast (1 oz)    Tuna, canned in water (1 oz)    Fairlife Milk (1/2 cup)    Yogurt, Greek : Siggis, Oikos Triple Zero, Dannon Light and Fit, etc (6 oz)    Carb (1 carb choice/serving = 15 grams)     Apple (medium)    Applesauce, unsweetened (1/2 cup)    Apricots, dried (4 whole)    Banana, medium (1/2)    Bread, 1 slice     Cantaloupe/Melon (1 cup)    Crackers 4-6 (varies by brand)    Cherries (15)    Cranberries, Dried (2 tbsp)    Dark Chocolate (1 oz)    Dates, dried (2-3 pieces)    Fruit cocktail, in own juice (1/2 cup)    Granola Bar (vary by brand)    Grapes (1/2 cup)    Ice cream, slow churned/low fat (1/2 cup)    Kiwi (~2)    Mixed Berries (1 cup)    Orange (1 medium)    Peach (1 medium)    Pear (1/2 medium)    Plums (2)    Pomegranate (1 small)    Popcorn, stove popped (2 cups)    Pretzels (3/4 oz)    Pudding, sugar free (1/2 cup)    Raisins (2 Tbsp)    Rice Cake, (2)    Skim or 1% milk (1 cup)    Tortilla Chips (1 oz)    Yogurt (greek or plain)   cup    Fat (1 serving = 100 calories)    Almonds (2 Tbsp)    Avocado (1/3 fruit OR 3 Tbsp)    Cashews (11 whole)    Cheese (1 oz)    Chocolate, dark (3/4 oz)    Coconut, unsweetened (1/3 c shredded)    Hummus (3 Tbsp)    Peanuts (20 pieces)    Peanut/Nut Butter (1 Tbsp)    Pecans (10 halves)    Pumpkin seeds, unshelled (2 Tbsp)    Salad dressing  (1-2 Tbsp)    Sunflower seeds (2 Tbsp)    Vegetable Dip (1-2 Tbsp)    Walnuts  (8 halves)    Free Foods    Bell Peppers    Broccoli    Carrots     Cauliflower     Celery    Coffee, black (regular or decaf)    Cucumber    Gelatin, Sugar Free    Herbal Tea, unsweetened     Pea Pods    Pickles (high in sodium)     Popsicle, Sugar Free    Salsa (2 Tbsp)    Tomatoes    Combination Snacks    Apple + 1 Tbsp peanut butter (carbohydrate + fat)    Handful crackers + 1 oz cheese (carbohydrate + fat or protein)    Carrot sticks + Hummus (free food + fat)    1 piece of peanut butter toast (carbohydrate + fat)    Greek yogurt + 2 Tbsp sunflower seeds (carbohydrate + fat)    Hard-boiled egg +   cup grapes (protein + carbohydrate)    1 piece of avocado toast (carbohydrate + fat)    Cucumbers + dip (free food + fat)    Snack Ideas for your Meal Plan     Protein (1 serving = 6-8 grams of protein each)    Beef Jerky ( 2 pieces)    Beef Sticks, plain (1 stick)    Cheese/Cheese Stick (1 oz)    Chicken breast (1 oz)    Cottage cheese, low fat (1/4 cup)    Crab, Imitation (2 oz)    Deli Meat (2 oz)    Edamame, boiled (1/2 cup)    Egg, hardboiled (1)    Shrimp, small (10 pieces)     Turkey Breast (1 oz)    Tuna, canned in water (1 oz)    Fairlife Milk (1/2 cup)    Yogurt, Greek : Siggis, Oikos Triple Zero, Dannon Light and Fit, etc (6 oz)    Carb (1 carb choice/serving = 15 grams)     Apple (medium)    Applesauce, unsweetened (1/2 cup)    Apricots, dried (4 whole)    Banana, medium (1/2)    Bread, 1 slice     Cantaloupe/Melon (1 cup)    Crackers 4-6 (varies by brand)    Cherries (15)    Cranberries, Dried (2 tbsp)    Dark Chocolate (1 oz)    Dates, dried (2-3 pieces)    Fruit cocktail, in own juice (1/2 cup)    Granola Bar (vary by brand)    Grapes (1/2 cup)    Ice cream, slow churned/low fat (1/2 cup)    Kiwi (~2)    Mixed Berries (1 cup)    Orange (1 medium)    Peach (1 medium)    Pear (1/2 medium)    Plums (2)    Pomegranate (1 small)    Popcorn, stove popped (2 cups)    Pretzels (3/4 oz)    Pudding, sugar free  (1/2 cup)    Raisins (2 Tbsp)    Rice Cake, (2)    Skim or 1% milk (1 cup)    Tortilla Chips (1 oz)    Yogurt (greek or plain)   cup    Fat (1 serving = 100 calories)    Almonds (2 Tbsp)    Avocado (1/3 fruit OR 3 Tbsp)    Cashews (11 whole)    Cheese (1 oz)    Chocolate, dark (3/4 oz)    Coconut, unsweetened (1/3 c shredded)    Hummus (3 Tbsp)    Peanuts (20 pieces)    Peanut/Nut Butter (1 Tbsp)    Pecans (10 halves)    Pumpkin seeds, unshelled (2 Tbsp)    Salad dressing  (1-2 Tbsp)    Sunflower seeds (2 Tbsp)    Vegetable Dip (1-2 Tbsp)    Walnuts (8 halves)    Free Foods    Bell Peppers    Broccoli    Carrots     Cauliflower     Celery    Coffee, black (regular or decaf)    Cucumber    Gelatin, Sugar Free    Herbal Tea, unsweetened     Pea Pods    Pickles (high in sodium)     Popsicle, Sugar Free    Salsa (2 Tbsp)    Tomatoes    Combination Snacks    Apple + 1 Tbsp peanut butter (carbohydrate + fat)    Handful crackers + 1 oz cheese (carbohydrate + fat or protein)    Carrot sticks + Hummus (free food + fat)    1 piece of peanut butter toast (carbohydrate + fat)    Greek yogurt + 2 Tbsp sunflower seeds (carbohydrate + fat)    Hard-boiled egg +   cup grapes (protein + carbohydrate)    1 piece of avocado toast (carbohydrate + fat)    Cucumbers + dip (free food + fat)

## 2021-06-11 DIAGNOSIS — E11.9 TYPE 2 DIABETES MELLITUS WITHOUT COMPLICATION, WITHOUT LONG-TERM CURRENT USE OF INSULIN (H): ICD-10-CM

## 2021-06-11 RX ORDER — DAPAGLIFLOZIN 10 MG/1
TABLET, FILM COATED ORAL
Qty: 90 TABLET | Refills: 0 | Status: SHIPPED | OUTPATIENT
Start: 2021-06-11 | End: 2021-12-23

## 2021-06-11 NOTE — TELEPHONE ENCOUNTER
Pending Prescriptions:                       Disp   Refills    FARXIGA 10 MG TABS tablet [Pharmacy Med N*90 tab*0            Sig: TAKE 1 TABLET BY MOUTH DAILY    Prescription approved per [employer] Refill Protocol.

## 2021-06-14 RX ORDER — METFORMIN HCL 500 MG
TABLET, EXTENDED RELEASE 24 HR ORAL
Qty: 360 TABLET | Refills: 0 | Status: SHIPPED | OUTPATIENT
Start: 2021-06-14 | End: 2021-09-21

## 2021-06-14 NOTE — TELEPHONE ENCOUNTER
"Prescription approved per [facility] Refill Protocol.      Requested Prescriptions   Pending Prescriptions Disp Refills     metFORMIN (GLUCOPHAGE-XR) 500 MG 24 hr tablet 360 tablet 0     Sig: Take 4 tablets by mouth daily wtih dinner       Biguanide Agents Passed - 6/11/2021  2:42 PM        Passed - Patient is age 10 or older        Passed - Patient has documented A1c within the specified period of time.     If HgbA1C is 8 or greater, it needs to be on file within the past 3 months.  If less than 8, must be on file within the past 6 months.     Recent Labs   Lab Test 04/21/21 0721   A1C 10.2*             Passed - Patient's CR is NOT>1.4 OR Patient's EGFR is NOT<45 within past 12 mos.     Recent Labs   Lab Test 04/21/21 0721   GFRESTIMATED 82   GFRESTBLACK >90       Recent Labs   Lab Test 04/21/21 0721   CR 1.00             Passed - Patient does NOT have a diagnosis of CHF.        Passed - Medication is active on med list        Passed - Recent (6 mo) or future (30 days) visit within the authorizing provider's specialty     Patient had office visit in the last 6 months or has a visit in the next 30 days with authorizing provider or within the authorizing provider's specialty.  See \"Patient Info\" tab in inbasket, or \"Choose Columns\" in Meds & Orders section of the refill encounter.                 "

## 2021-07-16 DIAGNOSIS — E11.9 TYPE 2 DIABETES MELLITUS WITHOUT COMPLICATION, WITHOUT LONG-TERM CURRENT USE OF INSULIN (H): ICD-10-CM

## 2021-07-16 RX ORDER — SIMVASTATIN 40 MG
TABLET ORAL
Qty: 90 TABLET | Refills: 2 | Status: SHIPPED | OUTPATIENT
Start: 2021-07-16 | End: 2022-03-16

## 2021-07-16 NOTE — TELEPHONE ENCOUNTER
Pending Prescriptions:                       Disp   Refills    simvastatin (ZOCOR) 40 MG tablet [Pharmac*90 tab*2            Sig: TAKE 1 TABLET BY MOUTH EVERYDAY AT BEDTIME    Prescription approved per [facility] Refill Protocol.

## 2021-08-13 DIAGNOSIS — E11.9 TYPE 2 DIABETES MELLITUS WITHOUT COMPLICATION, WITHOUT LONG-TERM CURRENT USE OF INSULIN (H): ICD-10-CM

## 2021-08-13 RX ORDER — DULAGLUTIDE 0.75 MG/.5ML
0.75 INJECTION, SOLUTION SUBCUTANEOUS
Qty: 1 ML | Refills: 1 | Status: SHIPPED | OUTPATIENT
Start: 2021-08-13 | End: 2021-10-05

## 2021-08-13 NOTE — TELEPHONE ENCOUNTER
Pending Prescriptions:                       Disp   Refills    TRULICITY 0.75 MG/0.5ML pen [Pharmacy Med *       3        Sig: Inject 0.75 mg Subcutaneous every 7 days    Routing refill request to provider for review/approval because:  Lab Results   Component Value Date    A1C 10.2 04/21/2021    A1C 8.4 02/25/2020    A1C 8.5 07/05/2019    A1C 8.5 02/01/2019    A1C 8.5 10/20/2018

## 2021-09-18 DIAGNOSIS — E11.9 TYPE 2 DIABETES MELLITUS WITHOUT COMPLICATION, WITHOUT LONG-TERM CURRENT USE OF INSULIN (H): ICD-10-CM

## 2021-09-21 RX ORDER — METFORMIN HCL 500 MG
TABLET, EXTENDED RELEASE 24 HR ORAL
Qty: 360 TABLET | Refills: 0 | Status: SHIPPED | OUTPATIENT
Start: 2021-09-21 | End: 2022-09-29

## 2021-09-21 NOTE — TELEPHONE ENCOUNTER
Routing refill request to provider for review/approval because:  Labs out of range:    Labs not current:

## 2021-09-25 ENCOUNTER — HEALTH MAINTENANCE LETTER (OUTPATIENT)
Age: 61
End: 2021-09-25

## 2021-10-01 DIAGNOSIS — E11.9 TYPE 2 DIABETES MELLITUS WITHOUT COMPLICATION, WITHOUT LONG-TERM CURRENT USE OF INSULIN (H): ICD-10-CM

## 2021-10-04 DIAGNOSIS — I10 ESSENTIAL HYPERTENSION, BENIGN: ICD-10-CM

## 2021-10-04 RX ORDER — HYDROCHLOROTHIAZIDE 25 MG/1
25 TABLET ORAL DAILY
Qty: 90 TABLET | Refills: 1 | Status: SHIPPED | OUTPATIENT
Start: 2021-10-04 | End: 2022-03-16

## 2021-10-05 RX ORDER — DULAGLUTIDE 0.75 MG/.5ML
0.75 INJECTION, SOLUTION SUBCUTANEOUS
Qty: 1.5 ML | Refills: 1 | Status: SHIPPED | OUTPATIENT
Start: 2021-10-05 | End: 2021-10-06

## 2021-10-06 RX ORDER — DULAGLUTIDE 0.75 MG/.5ML
0.75 INJECTION, SOLUTION SUBCUTANEOUS
Qty: 2 ML | Refills: 1 | Status: SHIPPED | OUTPATIENT
Start: 2021-10-06 | End: 2021-12-02

## 2021-10-29 ENCOUNTER — IMMUNIZATION (OUTPATIENT)
Dept: INTERNAL MEDICINE | Facility: CLINIC | Age: 61
End: 2021-10-29
Payer: COMMERCIAL

## 2021-10-29 DIAGNOSIS — Z23 NEED FOR PROPHYLACTIC VACCINATION AND INOCULATION AGAINST INFLUENZA: Primary | ICD-10-CM

## 2021-10-29 PROCEDURE — 90682 RIV4 VACC RECOMBINANT DNA IM: CPT

## 2021-10-29 PROCEDURE — 99207 PR NO CHARGE NURSE ONLY: CPT

## 2021-10-29 PROCEDURE — 90471 IMMUNIZATION ADMIN: CPT

## 2021-11-19 DIAGNOSIS — E11.9 TYPE 2 DIABETES MELLITUS WITHOUT COMPLICATION, WITHOUT LONG-TERM CURRENT USE OF INSULIN (H): ICD-10-CM

## 2021-11-20 ENCOUNTER — HEALTH MAINTENANCE LETTER (OUTPATIENT)
Age: 61
End: 2021-11-20

## 2021-11-23 NOTE — TELEPHONE ENCOUNTER
Routing refill request to provider for review/approval because:  Patient needs to be seen because:  due for diabetes check    Pending Prescriptions:                       Disp   Refills    blood glucose (NO BRAND SPECIFIED) test st*100 st*6        Sig: Use to test blood sugar 1-2 times daily or as           directed.  Blood Glucose Monitor Brands: per           insurance.

## 2021-12-02 DIAGNOSIS — E11.9 TYPE 2 DIABETES MELLITUS WITHOUT COMPLICATION, WITHOUT LONG-TERM CURRENT USE OF INSULIN (H): ICD-10-CM

## 2021-12-02 RX ORDER — DULAGLUTIDE 0.75 MG/.5ML
0.75 INJECTION, SOLUTION SUBCUTANEOUS
Qty: 6 ML | Refills: 1 | Status: SHIPPED | OUTPATIENT
Start: 2021-12-02 | End: 2022-04-06

## 2021-12-02 NOTE — TELEPHONE ENCOUNTER
Routing refill request to provider for review/approval because:  Failed protocol    Pending Prescriptions:                       Disp   Refills    TRULICITY 0.75 MG/0.5ML pen [Pharmacy Med *       1        Sig: Inject 0.75 mg Subcutaneous every 7 days

## 2021-12-23 DIAGNOSIS — E11.9 TYPE 2 DIABETES MELLITUS WITHOUT COMPLICATION, WITHOUT LONG-TERM CURRENT USE OF INSULIN (H): ICD-10-CM

## 2021-12-23 RX ORDER — DAPAGLIFLOZIN 10 MG/1
TABLET, FILM COATED ORAL
Qty: 90 TABLET | Refills: 0 | Status: SHIPPED | OUTPATIENT
Start: 2021-12-23 | End: 2022-04-05

## 2021-12-23 NOTE — TELEPHONE ENCOUNTER
Farxiga refill request. Last refill on 6/11/21 for #90    Routing refill request to provider for review/approval because:  A break in medication    Last OV on 4/21/21.   Pt was supposed to recheck A1C, overdue.     Lab Results   Component Value Date    A1C 10.2 04/21/2021    A1C 8.4 02/25/2020    A1C 8.5 07/05/2019    A1C 8.5 02/01/2019    A1C 8.5 10/20/2018

## 2022-03-02 DIAGNOSIS — E11.9 TYPE 2 DIABETES MELLITUS WITHOUT COMPLICATION, WITHOUT LONG-TERM CURRENT USE OF INSULIN (H): ICD-10-CM

## 2022-03-02 RX ORDER — PIOGLITAZONEHYDROCHLORIDE 45 MG/1
TABLET ORAL
Qty: 90 TABLET | Refills: 2 | Status: SHIPPED | OUTPATIENT
Start: 2022-03-02 | End: 2022-04-05

## 2022-03-02 NOTE — TELEPHONE ENCOUNTER
Routing refill request to provider for review/approval because:  Labs out of range:  A1C  Labs not current:  A1C  Patient needs to be seen because it has been more than 1 year since last office visit.

## 2022-03-16 DIAGNOSIS — E11.9 TYPE 2 DIABETES MELLITUS WITHOUT COMPLICATION, WITHOUT LONG-TERM CURRENT USE OF INSULIN (H): ICD-10-CM

## 2022-03-16 DIAGNOSIS — I10 ESSENTIAL HYPERTENSION, BENIGN: ICD-10-CM

## 2022-03-16 RX ORDER — SIMVASTATIN 40 MG
TABLET ORAL
Qty: 90 TABLET | Refills: 0 | Status: SHIPPED | OUTPATIENT
Start: 2022-03-16 | End: 2022-04-05

## 2022-03-16 RX ORDER — HYDROCHLOROTHIAZIDE 25 MG/1
TABLET ORAL
Qty: 90 TABLET | Refills: 0 | Status: SHIPPED | OUTPATIENT
Start: 2022-03-16 | End: 2022-04-05

## 2022-03-16 NOTE — TELEPHONE ENCOUNTER
Pending Prescriptions:                       Disp   Refills    hydrochlorothiazide (HYDRODIURIL) 25 MG t*90 tab*0            Sig: TAKE 1 TABLET BY MOUTH EVERY DAY      Medication is being filled for 1 time refill only due to:  Patient needs to be seen because due for an appointment.      Enriqueta DORSEY RN   [facility]

## 2022-03-16 NOTE — TELEPHONE ENCOUNTER
Pending Prescriptions:                       Disp   Refills    simvastatin (ZOCOR) 40 MG tablet [Pharmac*90 tab*0            Sig: TAKE 1 TABLET BY MOUTH EVERYDAY AT BEDTIME      Medication is being filled for 1 time refill only due to:  Patient needs to be seen because due for an appointment.       Enriqueta DORSEY RN   [facility]

## 2022-04-03 DIAGNOSIS — I10 ESSENTIAL HYPERTENSION, BENIGN: ICD-10-CM

## 2022-04-03 DIAGNOSIS — E11.9 TYPE 2 DIABETES MELLITUS WITHOUT COMPLICATION, WITHOUT LONG-TERM CURRENT USE OF INSULIN (H): ICD-10-CM

## 2022-04-05 NOTE — TELEPHONE ENCOUNTER
Trulicity 0.75 mg/0.5ml pen  Routing refill request to provider for review/approval because:  Labs out of range:  A1C  Patient needs to be seen because it has been more than 1 year since last office visit.    LOV 4/21/2021    Call placed to patient. Message left requesting an appointment. [employer] message sent to patient.

## 2022-04-06 RX ORDER — DULAGLUTIDE 0.75 MG/.5ML
0.75 INJECTION, SOLUTION SUBCUTANEOUS
Qty: 1.5 ML | Refills: 1 | Status: SHIPPED | OUTPATIENT
Start: 2022-04-06 | End: 2022-04-07

## 2022-04-06 RX ORDER — LOSARTAN POTASSIUM 50 MG/1
TABLET ORAL
Qty: 180 TABLET | Refills: 3 | Status: SHIPPED | OUTPATIENT
Start: 2022-04-06 | End: 2023-06-08

## 2022-04-06 RX ORDER — DAPAGLIFLOZIN 10 MG/1
10 TABLET, FILM COATED ORAL DAILY
Qty: 90 TABLET | Refills: 0 | Status: SHIPPED | OUTPATIENT
Start: 2022-04-06 | End: 2022-07-14

## 2022-04-06 RX ORDER — HYDROCHLOROTHIAZIDE 25 MG/1
25 TABLET ORAL DAILY
Qty: 90 TABLET | Refills: 0 | Status: SHIPPED | OUTPATIENT
Start: 2022-04-06 | End: 2022-06-21

## 2022-04-06 RX ORDER — SIMVASTATIN 40 MG
TABLET ORAL
Qty: 90 TABLET | Refills: 0 | Status: SHIPPED | OUTPATIENT
Start: 2022-04-06 | End: 2022-09-29

## 2022-04-06 RX ORDER — PIOGLITAZONEHYDROCHLORIDE 45 MG/1
45 TABLET ORAL DAILY
Qty: 90 TABLET | Refills: 2 | Status: SHIPPED | OUTPATIENT
Start: 2022-04-06 | End: 2023-03-08

## 2022-04-07 ENCOUNTER — TELEPHONE (OUTPATIENT)
Dept: INTERNAL MEDICINE | Facility: CLINIC | Age: 62
End: 2022-04-07
Payer: COMMERCIAL

## 2022-04-07 DIAGNOSIS — E11.9 TYPE 2 DIABETES MELLITUS WITHOUT COMPLICATION, WITHOUT LONG-TERM CURRENT USE OF INSULIN (H): ICD-10-CM

## 2022-04-07 RX ORDER — DULAGLUTIDE 0.75 MG/.5ML
0.75 INJECTION, SOLUTION SUBCUTANEOUS
Qty: 6 ML | Refills: 1 | Status: SHIPPED | OUTPATIENT
Start: 2022-04-07 | End: 2022-06-28 | Stop reason: DRUGHIGH

## 2022-04-21 ENCOUNTER — TRANSFERRED RECORDS (OUTPATIENT)
Dept: HEALTH INFORMATION MANAGEMENT | Facility: CLINIC | Age: 62
End: 2022-04-21
Payer: COMMERCIAL

## 2022-04-21 LAB — RETINOPATHY: NEGATIVE

## 2022-05-11 ENCOUNTER — OFFICE VISIT (OUTPATIENT)
Dept: INTERNAL MEDICINE | Facility: CLINIC | Age: 62
End: 2022-05-11
Payer: COMMERCIAL

## 2022-05-11 VITALS
HEIGHT: 68 IN | WEIGHT: 259 LBS | BODY MASS INDEX: 39.25 KG/M2 | OXYGEN SATURATION: 96 % | TEMPERATURE: 98.5 F | SYSTOLIC BLOOD PRESSURE: 113 MMHG | DIASTOLIC BLOOD PRESSURE: 73 MMHG | HEART RATE: 89 BPM

## 2022-05-11 DIAGNOSIS — E66.01 MORBID OBESITY (H): ICD-10-CM

## 2022-05-11 DIAGNOSIS — I10 ESSENTIAL HYPERTENSION, BENIGN: ICD-10-CM

## 2022-05-11 DIAGNOSIS — Z12.5 SCREENING FOR PROSTATE CANCER: ICD-10-CM

## 2022-05-11 DIAGNOSIS — Z13.220 SCREENING FOR HYPERLIPIDEMIA: ICD-10-CM

## 2022-05-11 DIAGNOSIS — E11.9 TYPE 2 DIABETES MELLITUS WITHOUT COMPLICATION, WITHOUT LONG-TERM CURRENT USE OF INSULIN (H): ICD-10-CM

## 2022-05-11 DIAGNOSIS — Z11.59 NEED FOR HEPATITIS C SCREENING TEST: ICD-10-CM

## 2022-05-11 DIAGNOSIS — Z00.00 ENCOUNTER FOR PREVENTATIVE ADULT HEALTH CARE EXAMINATION: Primary | ICD-10-CM

## 2022-05-11 DIAGNOSIS — Z11.4 SCREENING FOR HIV (HUMAN IMMUNODEFICIENCY VIRUS): ICD-10-CM

## 2022-05-11 LAB
ALBUMIN SERPL-MCNC: 3.8 G/DL (ref 3.4–5)
ALBUMIN UR-MCNC: NEGATIVE MG/DL
ALP SERPL-CCNC: 59 U/L (ref 40–150)
ALT SERPL W P-5'-P-CCNC: 23 U/L (ref 0–70)
ANION GAP SERPL CALCULATED.3IONS-SCNC: 7 MMOL/L (ref 3–14)
APPEARANCE UR: CLEAR
AST SERPL W P-5'-P-CCNC: 20 U/L (ref 0–45)
BACTERIA #/AREA URNS HPF: NORMAL /HPF
BILIRUB SERPL-MCNC: 0.4 MG/DL (ref 0.2–1.3)
BILIRUB UR QL STRIP: NEGATIVE
BUN SERPL-MCNC: 19 MG/DL (ref 7–30)
CALCIUM SERPL-MCNC: 9.5 MG/DL (ref 8.5–10.1)
CHLORIDE BLD-SCNC: 103 MMOL/L (ref 94–109)
CHOLEST SERPL-MCNC: 173 MG/DL
CO2 SERPL-SCNC: 26 MMOL/L (ref 20–32)
COLOR UR AUTO: YELLOW
CREAT SERPL-MCNC: 0.92 MG/DL (ref 0.66–1.25)
CREAT UR-MCNC: 47 MG/DL
ERYTHROCYTE [DISTWIDTH] IN BLOOD BY AUTOMATED COUNT: 14.5 % (ref 10–15)
FASTING STATUS PATIENT QL REPORTED: YES
GFR SERPL CREATININE-BSD FRML MDRD: >90 ML/MIN/1.73M2
GLUCOSE BLD-MCNC: 221 MG/DL (ref 70–99)
GLUCOSE UR STRIP-MCNC: >=1000 MG/DL
HBA1C MFR BLD: 10 % (ref 0–5.6)
HCT VFR BLD AUTO: 42.9 % (ref 40–53)
HCV AB SERPL QL IA: NONREACTIVE
HDLC SERPL-MCNC: 47 MG/DL
HGB BLD-MCNC: 14.4 G/DL (ref 13.3–17.7)
HGB UR QL STRIP: NEGATIVE
HIV 1+2 AB+HIV1 P24 AG SERPL QL IA: NONREACTIVE
KETONES UR STRIP-MCNC: NEGATIVE MG/DL
LDLC SERPL CALC-MCNC: 63 MG/DL
LEUKOCYTE ESTERASE UR QL STRIP: NEGATIVE
MCH RBC QN AUTO: 29.1 PG (ref 26.5–33)
MCHC RBC AUTO-ENTMCNC: 33.6 G/DL (ref 31.5–36.5)
MCV RBC AUTO: 87 FL (ref 78–100)
MICROALBUMIN UR-MCNC: <5 MG/L
MICROALBUMIN/CREAT UR: NORMAL MG/G{CREAT}
NITRATE UR QL: NEGATIVE
NONHDLC SERPL-MCNC: 126 MG/DL
PH UR STRIP: 6 [PH] (ref 5–7)
PLATELET # BLD AUTO: 269 10E3/UL (ref 150–450)
POTASSIUM BLD-SCNC: 4.4 MMOL/L (ref 3.4–5.3)
PROT SERPL-MCNC: 7.7 G/DL (ref 6.8–8.8)
PSA SERPL-MCNC: 0.4 UG/L (ref 0–4)
RBC # BLD AUTO: 4.95 10E6/UL (ref 4.4–5.9)
RBC #/AREA URNS AUTO: NORMAL /HPF
SODIUM SERPL-SCNC: 136 MMOL/L (ref 133–144)
SP GR UR STRIP: 1.01 (ref 1–1.03)
TRIGL SERPL-MCNC: 313 MG/DL
TSH SERPL DL<=0.005 MIU/L-ACNC: 2.97 MU/L (ref 0.4–4)
UROBILINOGEN UR STRIP-ACNC: 0.2 E.U./DL
WBC # BLD AUTO: 5.9 10E3/UL (ref 4–11)
WBC #/AREA URNS AUTO: NORMAL /HPF

## 2022-05-11 PROCEDURE — 86803 HEPATITIS C AB TEST: CPT | Performed by: INTERNAL MEDICINE

## 2022-05-11 PROCEDURE — 80053 COMPREHEN METABOLIC PANEL: CPT | Performed by: INTERNAL MEDICINE

## 2022-05-11 PROCEDURE — 83036 HEMOGLOBIN GLYCOSYLATED A1C: CPT | Performed by: INTERNAL MEDICINE

## 2022-05-11 PROCEDURE — 82043 UR ALBUMIN QUANTITATIVE: CPT | Performed by: INTERNAL MEDICINE

## 2022-05-11 PROCEDURE — 99213 OFFICE O/P EST LOW 20 MIN: CPT | Mod: 25 | Performed by: INTERNAL MEDICINE

## 2022-05-11 PROCEDURE — 85027 COMPLETE CBC AUTOMATED: CPT | Performed by: INTERNAL MEDICINE

## 2022-05-11 PROCEDURE — 84443 ASSAY THYROID STIM HORMONE: CPT | Performed by: INTERNAL MEDICINE

## 2022-05-11 PROCEDURE — 36415 COLL VENOUS BLD VENIPUNCTURE: CPT | Performed by: INTERNAL MEDICINE

## 2022-05-11 PROCEDURE — 87389 HIV-1 AG W/HIV-1&-2 AB AG IA: CPT | Performed by: INTERNAL MEDICINE

## 2022-05-11 PROCEDURE — 80061 LIPID PANEL: CPT | Performed by: INTERNAL MEDICINE

## 2022-05-11 PROCEDURE — 81001 URINALYSIS AUTO W/SCOPE: CPT | Performed by: INTERNAL MEDICINE

## 2022-05-11 PROCEDURE — 99396 PREV VISIT EST AGE 40-64: CPT | Performed by: INTERNAL MEDICINE

## 2022-05-11 PROCEDURE — G0103 PSA SCREENING: HCPCS | Performed by: INTERNAL MEDICINE

## 2022-05-11 ASSESSMENT — ENCOUNTER SYMPTOMS
CONSTIPATION: 0
JOINT SWELLING: 0
ABDOMINAL PAIN: 0
HEARTBURN: 0
EYE PAIN: 0
ARTHRALGIAS: 0
HEADACHES: 0
HEMATOCHEZIA: 0
SHORTNESS OF BREATH: 0
DIZZINESS: 0
DIARRHEA: 0
NAUSEA: 0
WEAKNESS: 0
MYALGIAS: 0
SORE THROAT: 0
NERVOUS/ANXIOUS: 0
FREQUENCY: 0
FEVER: 0
DYSURIA: 0
COUGH: 0
HEMATURIA: 0
PARESTHESIAS: 0
CHILLS: 0
PALPITATIONS: 0

## 2022-05-11 NOTE — PROGRESS NOTES
SUBJECTIVE:   CC: Garrick Espinosa is an 61 year old male who presents for preventative health visit.       Patient has been advised of split billing requirements and indicates understanding: Yes  Healthy Habits:     Getting at least 3 servings of Calcium per day:  Yes    Bi-annual eye exam:  Yes    Dental care twice a year:  Yes    Sleep apnea or symptoms of sleep apnea:  None    Diet:  Low salt and Breakfast skipped    Frequency of exercise:  4-5 days/week    Duration of exercise:  30-45 minutes    Taking medications regularly:  Yes    Medication side effects:  None    PHQ-2 Total Score: 0    Additional concerns today:  No          PROBLEMS TO ADD ON...  Has h/o HTN. on medical treatment. BP has been controlled. No side effects from medications. No CP, HA, dizziness. good compliance with medications and low salt diet.  Has H/O hyperlipidemia. On medical treatment and diet. No side effects. No muscle weakness, myalgias or upset stomach.   Has H/O DM. On diet , exercise , oral treatment and injections. Blood sugars are not well controlled. No parestesias. No hypoglycemias. Glucose is from 140-250 level.      Today's PHQ-2 Score:   PHQ-2 ( 1999 [employer]) 5/11/2022   Q1: Little interest or pleasure in doing things 0   Q2: Feeling down, depressed or hopeless 0   PHQ-2 Score 0   PHQ-2 Total Score (12-17 Years)- Positive if 3 or more points; Administer PHQ-A if positive -   Q1: Little interest or pleasure in doing things Not at all   Q2: Feeling down, depressed or hopeless Not at all   PHQ-2 Score 0       Abuse: Current or Past(Physical, Sexual or Emotional)- No  Do you feel safe in your environment? Yes        Social History     Tobacco Use     Smoking status: Never Smoker     Smokeless tobacco: Never Used   Substance Use Topics     Alcohol use: Yes     Comment: Occ     If you drink alcohol do you typically have >3 drinks per day or >7 drinks per week? No    Alcohol Use 5/11/2022   Prescreen: >3 drinks/day or >7  "drinks/week? No   Prescreen: >3 drinks/day or >7 drinks/week? -       Last PSA:   PSA   Date Value Ref Range Status   04/21/2021 0.33 0 - 4 ug/L Final     Comment:     Assay Method:  Chemiluminescence using Siemens Vista analyzer       Reviewed orders with patient. Reviewed health maintenance and updated orders accordingly - Yes  Lab work is in process  Labs reviewed in EPIC    Reviewed and updated as needed this visit by clinical staff   Tobacco  Allergies  Meds   Med Hx  Surg Hx  Fam Hx  Soc Hx          Reviewed and updated as needed this visit by Provider                       Review of Systems   Constitutional: Negative for chills and fever.   HENT: Negative for congestion, ear pain, hearing loss and sore throat.    Eyes: Negative for pain and visual disturbance.   Respiratory: Negative for cough and shortness of breath.    Cardiovascular: Negative for chest pain, palpitations and peripheral edema.   Gastrointestinal: Negative for abdominal pain, constipation, diarrhea, heartburn, hematochezia and nausea.   Genitourinary: Negative for dysuria, frequency, genital sores, hematuria, impotence, penile discharge and urgency.   Musculoskeletal: Negative for arthralgias, joint swelling and myalgias.   Skin: Negative for rash.   Neurological: Negative for dizziness, weakness, headaches and paresthesias.   Psychiatric/Behavioral: Negative for mood changes. The patient is not nervous/anxious.          OBJECTIVE:   /73 (BP Location: Left arm, Patient Position: Sitting, Cuff Size: Adult Large)   Pulse 89   Temp 98.5  F (36.9  C) (Oral)   Ht 1.727 m (5' 8\")   Wt 117.5 kg (259 lb)   SpO2 96%   BMI 39.38 kg/m      Physical Exam  GENERAL: overweight, healthy, alert and no distress  EYES: Eyes grossly normal to inspection, PERRL and conjunctivae and sclerae normal  HENT: ear canals and TM's normal, nose and mouth without ulcers or lesions  NECK: no adenopathy, no asymmetry, masses, or scars and thyroid normal " to palpation  RESP: lungs clear to auscultation - no rales, rhonchi or wheezes  CV: regular rate and rhythm, normal S1 S2, no S3 or S4, no murmur, click or rub, no peripheral edema and peripheral pulses strong  ABDOMEN: soft, nontender, no hepatosplenomegaly, no masses and bowel sounds normal  MS: no gross musculoskeletal defects noted, no edema  SKIN: no suspicious lesions or rashes  NEURO: Normal strength and tone, mentation intact and speech normal  PSYCH: mentation appears normal, affect normal/bright    Diagnostic Test Results:  Labs reviewed in Epic    ASSESSMENT/PLAN:       ICD-10-CM    1. Encounter for preventative adult health care examination  Z00.00 HEMOGLOBIN A1C     Albumin Random Urine Quantitative with Creat Ratio     CBC with platelets     Comprehensive metabolic panel (BMP + Alb, Alk Phos, ALT, AST, Total. Bili, TP)     PSA, screen     UA with Microscopic reflex to Culture - lab collect     TSH with free T4 reflex   2. Screening for HIV (human immunodeficiency virus)  Z11.4 HIV Antigen Antibody Combo   3. Need for hepatitis C screening test  Z11.59 Hepatitis C Screen Reflex to HCV RNA Quant and Genotype   4. Screening for hyperlipidemia  Z13.220 Lipid panel reflex to direct LDL Fasting   5. Morbid obesity (H)  E66.01    6. Type 2 diabetes mellitus without complication, without long-term current use of insulin (H)  E11.9 HEMOGLOBIN A1C     OFFICE/OUTPT VISIT,EST,LEVL III   7. Screening for prostate cancer  Z12.5 PSA, screen   8. Essential hypertension, benign  I10 Albumin Random Urine Quantitative with Creat Ratio     CBC with platelets     Comprehensive metabolic panel (BMP + Alb, Alk Phos, ALT, AST, Total. Bili, TP)     UA with Microscopic reflex to Culture - lab collect     TSH with free T4 reflex     OFFICE/OUTPT VISIT,EST,LEVL III   assess lab work   Cont treatment  May  Need to increase Metformin , currently on 500 mg daily.     Patient has been advised of split billing requirements and  "indicates understanding: Yes    COUNSELING:   Reviewed preventive health counseling, as reflected in patient instructions       Regular exercise       Healthy diet/nutrition       Vision screening       Hearing screening       Colorectal cancer screening       Prostate cancer screening    Estimated body mass index is 39.38 kg/m  as calculated from the following:    Height as of this encounter: 1.727 m (5' 8\").    Weight as of this encounter: 117.5 kg (259 lb).     Weight management plan: Discussed healthy diet and exercise guidelines    He reports that he has never smoked. He has never used smokeless tobacco.      Counseling Resources:  ATP IV Guidelines  Pooled Cohorts Equation Calculator  FRAX Risk Assessment  ICSI Preventive Guidelines  Dietary Guidelines for Americans, 2010  USDA's MyPlate  ASA Prophylaxis  Lung CA Screening    Ibrahima Smith MD  [facility]  "

## 2022-05-20 ENCOUNTER — TELEPHONE (OUTPATIENT)
Dept: INTERNAL MEDICINE | Facility: CLINIC | Age: 62
End: 2022-05-20
Payer: COMMERCIAL

## 2022-05-20 DIAGNOSIS — E11.9 TYPE 2 DIABETES MELLITUS WITHOUT COMPLICATION, WITHOUT LONG-TERM CURRENT USE OF INSULIN (H): Primary | ICD-10-CM

## 2022-05-20 RX ORDER — INSULIN GLARGINE 100 [IU]/ML
6 INJECTION, SOLUTION SUBCUTANEOUS DAILY
Qty: 15 ML | Refills: 1 | Status: SHIPPED | OUTPATIENT
Start: 2022-05-20 | End: 2023-01-13

## 2022-06-20 DIAGNOSIS — I10 ESSENTIAL HYPERTENSION, BENIGN: ICD-10-CM

## 2022-06-21 RX ORDER — HYDROCHLOROTHIAZIDE 25 MG/1
25 TABLET ORAL DAILY
Qty: 90 TABLET | Refills: 2 | Status: SHIPPED | OUTPATIENT
Start: 2022-06-21 | End: 2023-06-01

## 2022-06-22 NOTE — TELEPHONE ENCOUNTER
Pending Prescriptions:                       Disp   Refills    hydrochlorothiazide (HYDRODIURIL) 25 MG t*90 tab*2            Sig: Take 1 tablet (25 mg) by mouth daily    Prescription approved per [facility] Refill Protocol.

## 2022-06-27 ENCOUNTER — VIRTUAL VISIT (OUTPATIENT)
Dept: EDUCATION SERVICES | Facility: CLINIC | Age: 62
End: 2022-06-27
Attending: INTERNAL MEDICINE
Payer: COMMERCIAL

## 2022-06-27 DIAGNOSIS — E11.9 TYPE 2 DIABETES MELLITUS WITHOUT COMPLICATION, WITHOUT LONG-TERM CURRENT USE OF INSULIN (H): ICD-10-CM

## 2022-06-27 PROCEDURE — G0108 DIAB MANAGE TRN  PER INDIV: HCPCS | Mod: AE | Performed by: DIETITIAN, REGISTERED

## 2022-06-27 NOTE — PATIENT INSTRUCTIONS
Sample 3 Carb Breakfast Choices- Carbohydrate choices found in (  )    cup cooked cereal (1.5)  1 cup blueberries (1)  4 oz skim milk (0.5)  2 tablespoons of nuts (0)   1 whole grain English muffin (2)    cup mixed fruit (1)  1 boiled egg (0) 6 oz light yogurt (1)  1 small orange (1)  1 slice whole grain toast (1)  1 slice low fat cheese (0)    1 small whole grain tortilla (1)  1 cup skim milk (1)     banana (1)  1 Tablespoon peanut butter (0)   8 oz skim milk (1) + 1 packet of instant breakfast mix (2)   2 slices whole grain toast (2)   1 cup skim milk (1)  1 scrambled egg (0) 1 cup cubed sweet potatoes (2)  1 scrambled egg  (0)  Vegetables (0)  1 cup skim milk (1)   1 cup of skim milk (1)  1 medium banana (2)  1-2 tablespoons of peanut butter  (0)     3 Carbohydrate Choice Sample Meal Plans for Lunch or Supper   Carbohydrate choices found in (   )  2/3 cup whole grain spaghetti noodles (2) +    cup low sodium pasta sauce (1)+ 4 oz lean ground beef or turkey (0) +  2 cups lettuce + veggies (0) + 1-2 tsbp salad dressing (0) 2 slices of whole grain bread (2)  3 oz lean turkey (0) + lettuce/tomato (0)  2 tsp sanders (0)  1 small apple (1) 1 cup of low sodium broth based soup (1) +  2 slices of whole grain bread (2)   2 oz of low fat cheese (0) + 1 tsp butter (0)  Carrots/celery (0) 2 cups lettuce salad (0) +   cup garbanzo beans (1) + +   cup tuna (0) + 2 Tablespoons low-fat vinaigrette salad dressing (0) +   cup grapes (1) + 6 oz light yogurt (1)   1 cup low sodium broth based soup (1) + 6 saltine crackers (1) + 1 small apple (1) + broccoli/cauliflower and low fat dip (0)   3 oz lean steak (0) + 1 small 3 oz baked potato (1) + 1 tsp low fat sour cream (0) + 1 cup green beans (0) + 1 small dinner roll (1)   1 cup berries (1) + 1 tbsp light whipped cream (0)  2 cups lettuce salad (0)   3 oz grilled chicken (0)  1-2 Tsbp light Caesar dressing (0)  + 1 Tbps grated cheese (0)    cup grapes (1)  5 Triscuit crackers (1)  8 oz  skim milk (1)   3 oz chicken (0)  1 cup sweet potato (2)  1 cup asparagus (0)  1 small apple (1)  16 oz sparkling water (unsweetened)   1 hamburger wayne (0) on hamburger bun (2) + 1/2 order of small carrillo (1) + + 1 garden salad (0) with 1 package of vinaigrette (0) + 1 cup baby carrots + 1/2 cup green beans-cooked (0)  2 slices whole grain bread (2)+ 2 oz lean ham (0) + leaf lettuce/tomato/onion (0)  2 teaspoons sanders (0)  1 small pear (1)  1 cup raw veggie sticks (0) 2 slices thin crust pizza (2) + 2 cups lettuce salad (0) + 10 cherry tomatoes (0) + 2 Tbsp light salad dressing (0) + 1 small peach (1) 2 small whole grain tortillas (2) +   cup fat free refried beans (1) + 3 oz shredded lean beef (0) + 2 Tsbp salsa (0)+ lettuce/tomato (0) + 1 Tbsp light sour cream (0)   1 cup low sodium chicken noodle soup (1) + 2 slices whole grain bread (2) + 2 oz lean turkey (0) + 1 oz low fat cheese (0) + 1-2 teaspoons of butter or margarine  3 oz turkey (0) + 1 cup mashed potatoes (2) + 1 tsp butter (0) + 1 cup green beans (0) +   cup unsweetened apple sauce (1) 3 oz chicken + 2 medium pierogis (2) + 1/3 cup cabbage (0) +   cup grapes (1) +   cup green beans (0) I cup of beef stroganoff (0) + 2/3 cup egg noodles (2) + 1 cup broccoli (0) + 1 cup carrots (0) + 1 cup of watermelon (1)

## 2022-06-27 NOTE — LETTER
6/27/2022         RE: Garrick Espinosa  [address]  [facility] [address]        Dear Colleague,    Thank you for referring your patient, Garrick Espinosa, to the [facility]. Please see a copy of my visit note below.    Diabetes Self-Management Education & Support    Presents for: Individual review    Type of Visit: Telephone Visit    How would patient like to obtain AVS? MyChart    ASSESSMENT:    Garrick state that he wasn't as active over the winter and hasn't watching his diet as close. He states that he has started walking for 45 minutes, 4-5 days/week and has been working on his diet. He is wondering if he needs to start insulin, or if it would be okay to work in lifestyle changes to get BG down first. Today we reviewed healthy eating for diabetes, recommended that Garrick work on adding in some protein with breakfast and include fiber rich carbohydrate foods. We discussed his current medication regimen. He has not picked up the insulin yet. We discussed increasing the Trulicity dose first and working on lifestyle changes. Explained that he could  the insulin to get his BG down faster. We discussed the potential to increase the Trulicity dose to 1.5 mg, then 3 mg, then 4 mg if needed. We discussed BG monitoring, recommended that patient vary the times that he is testing his BG. Recommended that he add some strength training to his exercise routine.     Patient's most recent   Lab Results   Component Value Date    A1C 10.0 05/11/2022    A1C 10.2 04/21/2021    is not meeting goal of <7.0    Diabetes knowledge and skills assessment:   Patient is knowledgeable in diabetes management concepts related to: Healthy Eating, Being Active, Monitoring, Taking Medication, Problem Solving, Reducing Risks and Healthy Coping    Continue education with the following diabetes management concepts: Healthy Eating, Being Active, Monitoring, Taking Medication, Problem Solving, Reducing Risks and  Healthy Coping    Based on learning assessment above, most appropriate setting for further diabetes education would be: Individual setting.    INTERVENTIONS:    Education provided today on:  AADE Self-Care Behaviors:  Diabetes Pathophysiology  Healthy Eating: carbohydrate counting, consistency in amount, composition, and timing of food intake, weight reduction, portion control, plate planning method and label reading  Being Active: relationship to blood glucose and describe appropriate activity program  Monitoring: purpose, log and interpret results, individual blood glucose targets and frequency of monitoring  Taking Medication: action of prescribed medication, drawing up, administering and storing injectable diabetes medications, proper site selection and rotation for injections, side effects of prescribed medications and when to take medications  Problem Solving: high blood glucose - causes, signs/symptoms, treatment and prevention  Reducing Risks: major complications of diabetes and prevention, early diagnostic measures and treatment of complications    Opportunities for ongoing education and support in diabetes-self management were discussed. Pt verbalized understanding of concepts discussed and recommendations provided today.       Education Materials Provided:  [employer] Healthy Living with Diabetes Book, BG Log Sheet and My Plate Planner          PLAN    Increase Trulicity dose: 0.75 mg weekly --> 1.5 mg weekly  Add some protein with breakfast  Add in strength training      Topics to cover at upcoming visits: Healthy Eating, Being Active, Monitoring, Taking Medication, Problem Solving, Reducing Risks and Healthy Coping  Follow-up: 8/9    See Goals Section for co-developed, patient-stated behavior change goals.  AVS provided to patient today.          SUBJECTIVE / OBJECTIVE:  Presents for: Individual review  Accompanied by: Self  Diabetes education in the past 24mo: No  Focus of Visit: Taking  "Medication, Insulin Start  Diabetes type: Type 2  Disease course: Worsening  Diabetes management related comments/concerns: wondering about taking the insulin  Transportation concerns: No  Difficulty affording diabetes medication?: No  Difficulty affording diabetes testing supplies?: No  Other concerns:: None  Cultural Influences/Ethnic Background:  Choose not to answer    Diabetes Symptoms & Complications:  Fatigue: No  Neuropathy: No  Polydipsia: No  Polyphagia: No  Polyuria: No  Visual change: Yes  Slow healing wounds: No  Symptom course: Stable  Weight trend: Decreasing  Complications assessed today?: Yes    Patient Problem List and Family Medical History reviewed for relevant medical history, current medical status, and diabetes risk factors.    Vitals:  There were no vitals taken for this visit.  Estimated body mass index is 39.38 kg/m  as calculated from the following:    Height as of 5/11/22: 1.727 m (5' 8\").    Weight as of 5/11/22: 117.5 kg (259 lb).   Last 3 BP:   BP Readings from Last 3 Encounters:   05/11/22 113/73   04/21/21 122/80   02/25/20 138/80       History   Smoking Status     Never Smoker   Smokeless Tobacco     Never Used       Labs:  Lab Results   Component Value Date    A1C 10.0 05/11/2022    A1C 10.2 04/21/2021     Lab Results   Component Value Date     05/11/2022     04/21/2021     Lab Results   Component Value Date    LDL 63 05/11/2022    LDL  04/21/2021     Cannot estimate LDL when triglyceride exceeds 400 mg/dL    LDL 86 04/21/2021     HDL Cholesterol   Date Value Ref Range Status   04/21/2021 50 >39 mg/dL Final     Direct Measure HDL   Date Value Ref Range Status   05/11/2022 47 >=40 mg/dL Final   ]  GFR Estimate   Date Value Ref Range Status   05/11/2022 >90 >60 mL/min/1.73m2 Final     Comment:     Effective December 21, 2021 eGFRcr in adults is calculated using the 2021 CKD-EPI creatinine equation which includes age and gender (Shayna et al., NEJM, DOI: " 10.1056/UYDQba8968910)   04/21/2021 82 >60 mL/min/[1.73_m2] Final     Comment:     Non  GFR Calc  Starting 12/18/2018, serum creatinine based estimated GFR (eGFR) will be   calculated using the Chronic Kidney Disease Epidemiology Collaboration   (CKD-EPI) equation.       GFR Estimate If Black   Date Value Ref Range Status   04/21/2021 >90 >60 mL/min/[1.73_m2] Final     Comment:      GFR Calc  Starting 12/18/2018, serum creatinine based estimated GFR (eGFR) will be   calculated using the Chronic Kidney Disease Epidemiology Collaboration   (CKD-EPI) equation.       Lab Results   Component Value Date    CR 0.92 05/11/2022    CR 1.00 04/21/2021     No results found for: MICROALBUMIN    Healthy Eating:  Healthy Eating Assessed Today: Yes  Cultural/Jewish diet restrictions?: No  Meals include: Breakfast, Lunch, Dinner  Breakfast: package of instant oatmeal (flavored- low sugar) with raisins, sometimes with orange juice OR cheerios and milk, mocha drink (drinking all morning)- espresso machine- with 2% milk and a little bit of chocolate syrup  Lunch: 1 can of soup OR leftovers OR sandwich and a few chips  Dinner: meat, vegetable, carb OR chicken kebabs with peppers and salad, sometimes with rice or potatoes  Snacks: trying to cut back on snacking - might have some nuts  Beverages: Water, Coffee drinks, Diet soda, Juice (vitamin water)  Has patient met with a dietitian in the past?: Yes    Being Active:  Being Active Assessed Today: Yes  Exercise:: Yes  Days per week of moderate to strenuous exercise (like a brisk walk): 5 (2.5-3.5 miles)  On average, minutes per day of exercise at this level: 50  Exercise Minutes per Week: 250    Monitoring:  Monitoring Assessed Today: Yes  Did patient bring glucose meter to appointment? : Yes  Times checking blood sugar at home (number): 1  Times checking blood sugar at home (per): Day  Blood glucose trend: Decreasing    Glucose data:  BG readings- all  fasting    180  207  213  196  190  180  170  155  190  165        Taking Medications:  Diabetes Medication(s)     Biguanides       metFORMIN (GLUCOPHAGE-XR) 500 MG 24 hr tablet    TAKE 4 TABLETS BY MOUTH DAILY WTIH DINNER     Patient taking differently: Taking 1 tablet by mouth daily wtih dinner    Insulin       insulin glargine (BASAGLAR KWIKPEN) 100 UNIT/ML pen    Inject 6 Units Subcutaneous daily    Sodium-Glucose Co-Transporter 2 (SGLT2) Inhibitors       dapagliflozin (FARXIGA) 10 MG TABS tablet    Take 1 tablet (10 mg) by mouth daily    Sulfonylureas       glipiZIDE (GLUCOTROL XL) 10 MG 24 hr tablet    Take 1 tablet (10 mg) by mouth 2 times daily    Insulin Sensitizing Agents       pioglitazone (ACTOS) 45 MG tablet    Take 1 tablet (45 mg) by mouth daily    Incretin Mimetic Agents (GLP-1 Receptor Agonists)       dulaglutide (TRULICITY) 0.75 MG/0.5ML pen    Inject 0.75 mg Subcutaneous every 7 days INJECT 0.75 MG SUBCUTANEOUS EVERY 7 DAYS          Taking Medication Assessed Today: Yes  Current Treatments: Non-insulin Injectables, Oral Medication (taken by mouth)  Problems taking diabetes medications regularly?: No  Diabetes medication side effects?: No (constipation, rarely diarrhea)    Problem Solving:  Problem Solving Assessed Today: Yes  Is the patient at risk for hypoglycemia?: No  Is the patient at risk for DKA?: No  Does patient have severe weather/disaster plan for diabetes management?: No  Does patient have sick day plan for diabetes management?: No              Reducing Risks:  Reducing Risks Assessed Today: Yes  Diabetes Risks: Age over 45 years, Sedentary Lifestyle  CAD Risks: Diabetes Mellitus, Family history, Male sex, Obesity, Sedentary lifestyle, Stress  Has dilated eye exam at least once a year?: Yes  Sees dentist every 6 months?: Yes  Feet checked by healthcare provider in the last year?: Yes    Healthy Coping:  Healthy Coping Assessed Today: Yes  Patient Activation Measure Survey Score:  CHRISTIN  Score (Last Two) 6/3/2011   CHRISTIN Raw Score 39   Activation Score 56.4   CHRISTIN Level 3             Marlyn Foley RD LD [facility]  Time Spent: 50 minutes  Encounter Type: Individual        Any diabetes medication dose changes were made via the Certified Diabetes Care & Education Protocol in collaboration with the patient's referring provider. A copy of this encounter was shared with the provider.

## 2022-06-27 NOTE — PROGRESS NOTES
Diabetes Self-Management Education & Support    Presents for: Individual review    Type of Visit: Telephone Visit    How would patient like to obtain AVS? Minnie    ASSESSMENT:    Garrick state that he wasn't as active over the winter and hasn't watching his diet as close. He states that he has started walking for 45 minutes, 4-5 days/week and has been working on his diet. He is wondering if he needs to start insulin, or if it would be okay to work in lifestyle changes to get BG down first. Today we reviewed healthy eating for diabetes, recommended that Garrick work on adding in some protein with breakfast and include fiber rich carbohydrate foods. We discussed his current medication regimen. He has not picked up the insulin yet. We discussed increasing the Trulicity dose first and working on lifestyle changes. Explained that he could  the insulin to get his BG down faster. We discussed the potential to increase the Trulicity dose to 1.5 mg, then 3 mg, then 4 mg if needed. We discussed BG monitoring, recommended that patient vary the times that he is testing his BG. Recommended that he add some strength training to his exercise routine.     Patient's most recent   Lab Results   Component Value Date    A1C 10.0 05/11/2022    A1C 10.2 04/21/2021    is not meeting goal of <7.0    Diabetes knowledge and skills assessment:   Patient is knowledgeable in diabetes management concepts related to: Healthy Eating, Being Active, Monitoring, Taking Medication, Problem Solving, Reducing Risks and Healthy Coping    Continue education with the following diabetes management concepts: Healthy Eating, Being Active, Monitoring, Taking Medication, Problem Solving, Reducing Risks and Healthy Coping    Based on learning assessment above, most appropriate setting for further diabetes education would be: Individual setting.    INTERVENTIONS:    Education provided today on:  AADE Self-Care Behaviors:  Diabetes Pathophysiology  Healthy  Eating: carbohydrate counting, consistency in amount, composition, and timing of food intake, weight reduction, portion control, plate planning method and label reading  Being Active: relationship to blood glucose and describe appropriate activity program  Monitoring: purpose, log and interpret results, individual blood glucose targets and frequency of monitoring  Taking Medication: action of prescribed medication, drawing up, administering and storing injectable diabetes medications, proper site selection and rotation for injections, side effects of prescribed medications and when to take medications  Problem Solving: high blood glucose - causes, signs/symptoms, treatment and prevention  Reducing Risks: major complications of diabetes and prevention, early diagnostic measures and treatment of complications    Opportunities for ongoing education and support in diabetes-self management were discussed. Pt verbalized understanding of concepts discussed and recommendations provided today.       Education Materials Provided:  [employer] Healthy Living with Diabetes Book, BG Log Sheet and My Plate Planner          PLAN    Increase Trulicity dose: 0.75 mg weekly --> 1.5 mg weekly  Add some protein with breakfast  Add in strength training      Topics to cover at upcoming visits: Healthy Eating, Being Active, Monitoring, Taking Medication, Problem Solving, Reducing Risks and Healthy Coping  Follow-up: 8/9    See Goals Section for co-developed, patient-stated behavior change goals.  AVS provided to patient today.          SUBJECTIVE / OBJECTIVE:  Presents for: Individual review  Accompanied by: Self  Diabetes education in the past 24mo: No  Focus of Visit: Taking Medication, Insulin Start  Diabetes type: Type 2  Disease course: Worsening  Diabetes management related comments/concerns: wondering about taking the insulin  Transportation concerns: No  Difficulty affording diabetes medication?: No  Difficulty affording  "diabetes testing supplies?: No  Other concerns:: None  Cultural Influences/Ethnic Background:  Choose not to answer    Diabetes Symptoms & Complications:  Fatigue: No  Neuropathy: No  Polydipsia: No  Polyphagia: No  Polyuria: No  Visual change: Yes  Slow healing wounds: No  Symptom course: Stable  Weight trend: Decreasing  Complications assessed today?: Yes    Patient Problem List and Family Medical History reviewed for relevant medical history, current medical status, and diabetes risk factors.    Vitals:  There were no vitals taken for this visit.  Estimated body mass index is 39.38 kg/m  as calculated from the following:    Height as of 5/11/22: 1.727 m (5' 8\").    Weight as of 5/11/22: 117.5 kg (259 lb).   Last 3 BP:   BP Readings from Last 3 Encounters:   05/11/22 113/73   04/21/21 122/80   02/25/20 138/80       History   Smoking Status     Never Smoker   Smokeless Tobacco     Never Used       Labs:  Lab Results   Component Value Date    A1C 10.0 05/11/2022    A1C 10.2 04/21/2021     Lab Results   Component Value Date     05/11/2022     04/21/2021     Lab Results   Component Value Date    LDL 63 05/11/2022    LDL  04/21/2021     Cannot estimate LDL when triglyceride exceeds 400 mg/dL    LDL 86 04/21/2021     HDL Cholesterol   Date Value Ref Range Status   04/21/2021 50 >39 mg/dL Final     Direct Measure HDL   Date Value Ref Range Status   05/11/2022 47 >=40 mg/dL Final   ]  GFR Estimate   Date Value Ref Range Status   05/11/2022 >90 >60 mL/min/1.73m2 Final     Comment:     Effective December 21, 2021 eGFRcr in adults is calculated using the 2021 CKD-EPI creatinine equation which includes age and gender (Shayna trinidad al., NEJM, DOI: 10.1056/SBQQsy0013370)   04/21/2021 82 >60 mL/min/[1.73_m2] Final     Comment:     Non  GFR Calc  Starting 12/18/2018, serum creatinine based estimated GFR (eGFR) will be   calculated using the Chronic Kidney Disease Epidemiology Collaboration   (CKD-EPI) " equation.       GFR Estimate If Black   Date Value Ref Range Status   04/21/2021 >90 >60 mL/min/[1.73_m2] Final     Comment:      GFR Calc  Starting 12/18/2018, serum creatinine based estimated GFR (eGFR) will be   calculated using the Chronic Kidney Disease Epidemiology Collaboration   (CKD-EPI) equation.       Lab Results   Component Value Date    CR 0.92 05/11/2022    CR 1.00 04/21/2021     No results found for: MICROALBUMIN    Healthy Eating:  Healthy Eating Assessed Today: Yes  Cultural/Yazidi diet restrictions?: No  Meals include: Breakfast, Lunch, Dinner  Breakfast: package of instant oatmeal (flavored- low sugar) with raisins, sometimes with orange juice OR cheerios and milk, mocha drink (drinking all morning)- espresso machine- with 2% milk and a little bit of chocolate syrup  Lunch: 1 can of soup OR leftovers OR sandwich and a few chips  Dinner: meat, vegetable, carb OR chicken kebabs with peppers and salad, sometimes with rice or potatoes  Snacks: trying to cut back on snacking - might have some nuts  Beverages: Water, Coffee drinks, Diet soda, Juice (vitamin water)  Has patient met with a dietitian in the past?: Yes    Being Active:  Being Active Assessed Today: Yes  Exercise:: Yes  Days per week of moderate to strenuous exercise (like a brisk walk): 5 (2.5-3.5 miles)  On average, minutes per day of exercise at this level: 50  Exercise Minutes per Week: 250    Monitoring:  Monitoring Assessed Today: Yes  Did patient bring glucose meter to appointment? : Yes  Times checking blood sugar at home (number): 1  Times checking blood sugar at home (per): Day  Blood glucose trend: Decreasing    Glucose data:  BG readings- all fasting    180  207  213  196  190  180  170  155  190  165        Taking Medications:  Diabetes Medication(s)     Biguanides       metFORMIN (GLUCOPHAGE-XR) 500 MG 24 hr tablet    TAKE 4 TABLETS BY MOUTH DAILY WTIH DINNER     Patient taking differently: Taking 1 tablet by  mouth daily wtih dinner    Insulin       insulin glargine (BASAGLAR KWIKPEN) 100 UNIT/ML pen    Inject 6 Units Subcutaneous daily    Sodium-Glucose Co-Transporter 2 (SGLT2) Inhibitors       dapagliflozin (FARXIGA) 10 MG TABS tablet    Take 1 tablet (10 mg) by mouth daily    Sulfonylureas       glipiZIDE (GLUCOTROL XL) 10 MG 24 hr tablet    Take 1 tablet (10 mg) by mouth 2 times daily    Insulin Sensitizing Agents       pioglitazone (ACTOS) 45 MG tablet    Take 1 tablet (45 mg) by mouth daily    Incretin Mimetic Agents (GLP-1 Receptor Agonists)       dulaglutide (TRULICITY) 0.75 MG/0.5ML pen    Inject 0.75 mg Subcutaneous every 7 days INJECT 0.75 MG SUBCUTANEOUS EVERY 7 DAYS          Taking Medication Assessed Today: Yes  Current Treatments: Non-insulin Injectables, Oral Medication (taken by mouth)  Problems taking diabetes medications regularly?: No  Diabetes medication side effects?: No (constipation, rarely diarrhea)    Problem Solving:  Problem Solving Assessed Today: Yes  Is the patient at risk for hypoglycemia?: No  Is the patient at risk for DKA?: No  Does patient have severe weather/disaster plan for diabetes management?: No  Does patient have sick day plan for diabetes management?: No              Reducing Risks:  Reducing Risks Assessed Today: Yes  Diabetes Risks: Age over 45 years, Sedentary Lifestyle  CAD Risks: Diabetes Mellitus, Family history, Male sex, Obesity, Sedentary lifestyle, Stress  Has dilated eye exam at least once a year?: Yes  Sees dentist every 6 months?: Yes  Feet checked by healthcare provider in the last year?: Yes    Healthy Coping:  Healthy Coping Assessed Today: Yes  Patient Activation Measure Survey Score:  CHRISTIN Score (Last Two) 6/3/2011   CHRISTIN Raw Score 39   Activation Score 56.4   CHRISTIN Level 3             Marlyn Foley RD LD [facility]  Time Spent: 50 minutes  Encounter Type: Individual        Any diabetes medication dose changes were made via the Certified Diabetes Care & Education  Protocol in collaboration with the patient's referring provider. A copy of this encounter was shared with the provider.

## 2022-06-28 RX ORDER — DULAGLUTIDE 1.5 MG/.5ML
1.5 INJECTION, SOLUTION SUBCUTANEOUS
Qty: 2 ML | Refills: 1 | Status: SHIPPED | OUTPATIENT
Start: 2022-06-28 | End: 2022-11-02

## 2022-07-12 DIAGNOSIS — E11.9 TYPE 2 DIABETES MELLITUS WITHOUT COMPLICATION, WITHOUT LONG-TERM CURRENT USE OF INSULIN (H): ICD-10-CM

## 2022-07-14 RX ORDER — DAPAGLIFLOZIN 10 MG/1
TABLET, FILM COATED ORAL
Qty: 90 TABLET | Refills: 1 | Status: SHIPPED | OUTPATIENT
Start: 2022-07-14 | End: 2023-02-27

## 2022-08-07 ENCOUNTER — NURSE TRIAGE (OUTPATIENT)
Dept: NURSING | Facility: CLINIC | Age: 62
End: 2022-08-07

## 2022-08-07 NOTE — TELEPHONE ENCOUNTER
Pt is calling in about Covid symptoms that started on 8/2/2022. Pt reports cough, and congestion, runny nose and nasal congestion, headache, body fatigue, mild chest tightness, and mild shortness of breath. Pt denies diarrhea, body aches, chills, fever difficulty breathing. Pt has not taken a Covid test yet.     Care advice given:    COVID-19 - HOW TO PROTECT OTHERS - WHEN YOU ARE SICK WITH COVID-19:  * STAY HOME A MINIMUM OF 5 DAYS: Home isolation is needed for at least 5 days after the symptoms started. Stay home from school or work if you are sick. Do NOT go to [employer] services,  centers, shopping, or other public places. Do NOT use public transportation (e.g., bus, taxis, ride-sharing). Do NOT allow any visitors to your home. Leave the house only if you need to seek urgent medical care.  * WEAR A MASK FOR 10 DAYS: Wear a well-fitted mask for 10 full days any time you are around others inside your home or in public.     GENERAL CARE ADVICE FOR COVID-19 SYMPTOMS:  * The symptoms are generally treated the same whether you have COVID-19, influenza or some other respiratory virus.  * Cough: Use cough drops.  * Feeling dehydrated: Drink extra liquids. If the air in your home is dry, use a humidifier.  * Fever: For fever over 101 F (38.3 C), take acetaminophen every 4 to 6 hours (Adults 650 mg) OR ibuprofen every 6 to 8 hours (Adults 400 mg). Before taking any medicine, read all the instructions on the package. Do not take aspirin unless your doctor has prescribed it for you.  * Muscle aches, headache, and other pains: Often this comes and goes with the fever. Take acetaminophen every 4 to 6 hours (Adults 650 mg) OR ibuprofen every 6 to 8 hours (Adults 400 mg). Before taking any medicine, read all the instructions on the package.   * Sore throat: Try throat lozenges, hard candy or warm chicken broth.    * POSITIVE VIRAL TEST: After a positive test, repeat tests are generally not recommended for 90 days (3  months). Reason: Even after it is safe to stop isolation (usually 5 days), tests may stay positive. Further, re-infection appears to be rare during the initial 90 days after symptom onset of the preceding infection. However, if you have new symptoms of COVID-19 within 14 days of exposure to someone with COVID-19, you should stay home (isolate).    Per protocol pt should be able to treat this at home. Pt is interested in Covid treatment if he should test positive, so he will plan on doing a home test today, and will call back if it is positive.    Pt was advised to:    CALL BACK IF:   * Fever over 103 F (39.4 C)  * Fever lasts over 3 days  * Fever returns after being gone for 24 hour  * Chest pain or difficulty breathing occurs  * You become worse    Pt verbalized understanding.     Vu Campos RN on 8/7/2022 at 10:27 AM      COVID 19 Nurse Triage Plan/Patient Instructions    Please be aware that novel coronavirus (COVID-19) may be circulating in the community. If you develop symptoms such as fever, cough, or SOB or if you have concerns about the presence of another infection including coronavirus (COVID-19), please contact your health care provider or visit [email or web address].     Disposition/Instructions    Home care recommended. Follow home care protocol based instructions.    Thank you for taking steps to prevent the spread of this virus.  o Limit your contact with others.  o Wear a simple mask to cover your cough.  o Wash your hands well and often.    Resources    [facility]: About COVID-19: [email or web address]    CDC: What to Do If You're Sick: www.cdc.gov/coronavirus/2019-ncov/about/steps-when-sick.html    CDC: Ending Home Isolation: www.cdc.gov/coronavirus/2019-ncov/hcp/disposition-in-home-patients.html     CDC: Caring for Someone: www.cdc.gov/coronavirus/2019-ncov/if-you-are-sick/care-for-someone.html     [facility]: Interim Guidance for Hospital Discharge to Home:  [email or web address]/coronavirus/hcp/hospdischarge.pdf    [facility] clinical trials (COVID-19 research studies): [email or web address]     Below are the COVID-19 hotlines at the [place] Department of Health ([facility]). Interpreters are available.   o For health questions: Call [phone] or 1-475.914.4533 (7 a.m. to 7 p.m.)  o For questions about schools and childcare: Call 720-872-9306 or 1-688.176.6277 (7 a.m. to 7 p.m.)     Reason for Disposition    [1] COVID-19 infection suspected by caller or triager AND [2] mild symptoms (cough, fever, or others) AND [3] has not gotten tested yet    Additional Information    Negative: SEVERE difficulty breathing (e.g., struggling for each breath, speaks in single words)    Negative: Difficult to awaken or acting confused (e.g., disoriented, slurred speech)    Negative: Bluish (or gray) lips or face now    Negative: Shock suspected (e.g., cold/pale/clammy skin, too weak to stand, low BP, rapid pulse)    Negative: Sounds like a life-threatening emergency to the triager    Negative: [1] Diagnosed or suspected COVID-19 AND [2] symptoms lasting 3 or more weeks    Negative: [1] COVID-19 exposure AND [2] no symptoms    Negative: COVID-19 vaccine reaction suspected (e.g., fever, headache, muscle aches) occurring 1 to 3 days after getting vaccine    Negative: COVID-19 vaccine, questions about    Negative: [1] Lives with someone known to have influenza (flu test positive) AND [2] flu-like symptoms (e.g., cough, runny nose, sore throat, SOB; with or without fever)    Negative: [1] Adult with possible COVID-19 symptoms AND [2] triager concerned about severity of symptoms or other causes    Negative: COVID-19 and breastfeeding, questions about    Negative: SEVERE or constant chest pain or pressure  (Exception: Mild central chest pain, present only when coughing.)    Negative: MODERATE difficulty breathing (e.g., speaks in phrases, SOB even at  rest, pulse 100-120)    Negative: [1] Headache AND [2] stiff neck (can't touch chin to chest)    Negative: Oxygen level (e.g., pulse oximetry) 90 percent or lower    Negative: Chest pain or pressure    Negative: Patient sounds very sick or weak to the triager    Negative: MILD difficulty breathing (e.g., minimal/no SOB at rest, SOB with walking, pulse <100)    Negative: Fever > 103 F (39.4 C)    Negative: [1] Fever > 101 F (38.3 C) AND [2] age > 60 years    Negative: [1] Fever > 100.0 F (37.8 C) AND [2] bedridden (e.g., nursing home patient, CVA, chronic illness, recovering from surgery)    Negative: HIGH RISK for severe COVID complications (e.g., weak immune system, age > 64 years, obesity with BMI > 25, pregnant, chronic lung disease or other chronic medical condition)  (Exception: Already seen by PCP and no new or worsening symptoms.)    Negative: [1] HIGH RISK patient AND [2] influenza is widespread in the community AND [3] ONE OR MORE respiratory symptoms: cough, sore throat, runny or stuffy nose    Negative: [1] HIGH RISK patient AND [2] influenza exposure within the last 7 days AND [3] ONE OR MORE respiratory symptoms: cough, sore throat, runny or stuffy nose    Negative: Oxygen level (e.g., pulse oximetry) 91 to 94 percent    Negative: Fever present > 3 days (72 hours)    Negative: [1] Fever returns after gone for over 24 hours AND [2] symptoms worse or not improved    Negative: [1] Continuous (nonstop) coughing interferes with work or school AND [2] no improvement using cough treatment per Care Advice    Negative: [1] COVID-19 infection suspected by caller or triager AND [2] mild symptoms (cough, fever, or others) AND [3] negative COVID-19 rapid test    Negative: Cough present > 3 weeks    Negative: [1] COVID-19 diagnosed by positive lab test (e.g., PCR, rapid self-test kit) AND [2] NO symptoms (e.g., cough, fever, others)    Negative: [1] COVID-19 diagnosed by positive lab test (e.g., PCR, rapid self-test  kit) AND [2] mild symptoms (e.g., cough, fever, others) AND [3] no complications or SOB    Negative: [1] COVID-19 diagnosed by doctor (or NP/PA) AND [2] mild symptoms (e.g., cough, fever, others) AND [3] no complications or SOB    Negative: [1] COVID-19 diagnosed AND [2] has mild nausea, vomiting or diarrhea    Protocols used: CORONAVIRUS (COVID-19) DIAGNOSED OR [id number] 1.18.2022

## 2022-08-08 ENCOUNTER — OFFICE VISIT (OUTPATIENT)
Dept: URGENT CARE | Facility: URGENT CARE | Age: 62
End: 2022-08-08

## 2022-08-08 ENCOUNTER — E-VISIT (OUTPATIENT)
Dept: URGENT CARE | Facility: CLINIC | Age: 62
End: 2022-08-08
Payer: COMMERCIAL

## 2022-08-08 VITALS
RESPIRATION RATE: 16 BRPM | WEIGHT: 260 LBS | SYSTOLIC BLOOD PRESSURE: 128 MMHG | DIASTOLIC BLOOD PRESSURE: 82 MMHG | TEMPERATURE: 98.6 F | HEART RATE: 83 BPM | OXYGEN SATURATION: 99 % | BODY MASS INDEX: 39.53 KG/M2

## 2022-08-08 DIAGNOSIS — J20.8 VIRAL BRONCHITIS: Primary | ICD-10-CM

## 2022-08-08 DIAGNOSIS — R05.9 COUGH: Primary | ICD-10-CM

## 2022-08-08 DIAGNOSIS — J02.9 VIRAL PHARYNGITIS: ICD-10-CM

## 2022-08-08 LAB
DEPRECATED S PYO AG THROAT QL EIA: NEGATIVE
GROUP A STREP BY PCR: NOT DETECTED

## 2022-08-08 PROCEDURE — 99207 PR NON-BILLABLE SERV PER CHARTING: CPT | Performed by: FAMILY MEDICINE

## 2022-08-08 PROCEDURE — 87651 STREP A DNA AMP PROBE: CPT | Performed by: PHYSICIAN ASSISTANT

## 2022-08-08 PROCEDURE — U0003 INFECTIOUS AGENT DETECTION BY NUCLEIC ACID (DNA OR RNA); SEVERE ACUTE RESPIRATORY SYNDROME CORONAVIRUS 2 (SARS-COV-2) (CORONAVIRUS DISEASE [COVID-19]), AMPLIFIED PROBE TECHNIQUE, MAKING USE OF HIGH THROUGHPUT TECHNOLOGIES AS DESCRIBED BY CMS-2020-01-R: HCPCS | Performed by: PHYSICIAN ASSISTANT

## 2022-08-08 PROCEDURE — 99213 OFFICE O/P EST LOW 20 MIN: CPT | Mod: CS | Performed by: PHYSICIAN ASSISTANT

## 2022-08-08 PROCEDURE — U0005 INFEC AGEN DETEC AMPLI PROBE: HCPCS | Performed by: PHYSICIAN ASSISTANT

## 2022-08-08 RX ORDER — CODEINE PHOSPHATE AND GUAIFENESIN 10; 100 MG/5ML; MG/5ML
1-2 SOLUTION ORAL
Qty: 50 ML | Refills: 0 | Status: SHIPPED | OUTPATIENT
Start: 2022-08-08 | End: 2022-08-13

## 2022-08-08 RX ORDER — BENZONATATE 100 MG/1
100 CAPSULE ORAL 3 TIMES DAILY PRN
Qty: 30 CAPSULE | Refills: 0 | Status: SHIPPED | OUTPATIENT
Start: 2022-08-08 | End: 2022-08-15

## 2022-08-08 NOTE — PROGRESS NOTES
URGENT CARE VISIT:    SUBJECTIVE:   Garrick Espinosa is a 61 year old male presenting with a chief complaint of cough - productive and sore throat.  Onset was 6 day(s) ago.   He denies the following symptoms: fever, chills, runny nose and stuffy nose  Course of illness is same.    Treatment measures tried include OTC Cough med with no relief of symptoms.  Predisposing factors include none. Had a negative at home COVID test.    PMH:   Past Medical History:   Diagnosis Date     Allergic rhinitis, cause unspecified      Diabetes mellitus (H) 6/09      Essential hypertension, benign      Other and unspecified hyperlipidemia      Psoriasis      Sciatica     R leg radiation; remote;; resolved      Allergies: Accupril, Ace inhibitors, Cats, Dogs, and Seasonal allergies   Medications:   Current Outpatient Medications   Medication Sig Dispense Refill     ASPIRIN PO Take 162 mg by mouth daily       benzonatate (TESSALON) 100 MG capsule Take 1 capsule (100 mg) by mouth 3 times daily as needed for cough 30 capsule 0     blood glucose (NO BRAND SPECIFIED) test strip Use to test blood sugar 1-2 times daily or as directed.  Blood Glucose Monitor Brands: per insurance. 100 strip 6     blood glucose calibration (NO BRAND SPECIFIED) solution To accompany: Blood Glucose Monitor Brands: per insurance. 1 Bottle 3     blood glucose monitoring (ONE TOUCH ULTRA 2) meter device kit USE TO TEST BLOOD SUGAR 1-2 TIMES DAILY OR AS DIRECTED. 1 kit 0     doxylamine (UNISOM) 25 MG TABS tablet Take 25 mg by mouth At Bedtime       dulaglutide (TRULICITY) 1.5 MG/0.5ML pen Inject 1.5 mg Subcutaneous every 7 days 2 mL 1     FARXIGA 10 MG TABS tablet TAKE 1 TABLET (10 MG) BY MOUTH DAILY. 90 tablet 1     glipiZIDE (GLUCOTROL XL) 10 MG 24 hr tablet Take 1 tablet (10 mg) by mouth 2 times daily 180 tablet 1     guaiFENesin-codeine (ROBITUSSIN AC) 100-10 MG/5ML solution Take 5-10 mLs by mouth nightly as needed for cough 50 mL 0     hydrochlorothiazide  (HYDRODIURIL) 25 MG tablet Take 1 tablet (25 mg) by mouth daily 90 tablet 2     indomethacin (INDOCIN) 50 MG capsule Take 1 capsule (50 mg) by mouth 2 times daily (with meals) (Patient taking differently: Take 50 mg by mouth 2 times daily (with meals) As needed) 30 capsule 0     insulin glargine (BASAGLAR KWIKPEN) 100 UNIT/ML pen Inject 6 Units Subcutaneous daily 15 mL 1     losartan (COZAAR) 50 MG tablet TAKE 2 TABLETS BY MOUTH EVERY  tablet 3     MELATONIN PO Take by mouth nightly as needed       metFORMIN (GLUCOPHAGE-XR) 500 MG 24 hr tablet TAKE 4 TABLETS BY MOUTH DAILY WTIH DINNER (Patient taking differently: Taking 1 tablet by mouth daily wtih dinner) 360 tablet 0     MULTI-DAY VITAMINS OR TABS 1 TABLET DAILY       pioglitazone (ACTOS) 45 MG tablet Take 1 tablet (45 mg) by mouth daily 90 tablet 2     simvastatin (ZOCOR) 40 MG tablet TAKE 1 TABLET BY MOUTH EVERYDAY AT BEDTIME 90 tablet 0     Thiamine HCl (VITAMIN B-1 PO) Take by mouth daily Taking on summer time only       thin (NO BRAND SPECIFIED) lancets Use with lanceting device. To accompany: Blood Glucose Monitor Brands: per insurance. 100 each 2     triamcinolone (KENALOG) 0.1 % external cream APPLY THIN LAYER TO RASH ON SKIN TWICE DAILY FOR NO MORE THAN 10-14 DAYS 80 g 3     albuterol (PROAIR HFA/PROVENTIL HFA/VENTOLIN HFA) 108 (90 Base) MCG/ACT inhaler Inhale 2 puffs into the lungs every 6 hours (Patient not taking: Reported on 8/8/2022) 18 g 3     TYLENOL EXTRA STRENGTH 500 MG PO TABS 1 TABLET EVERY 4 HOURS AS NEEDED (Patient not taking: Reported on 8/8/2022)       Social History:   Social History     Tobacco Use     Smoking status: Never Smoker     Smokeless tobacco: Never Used   Substance Use Topics     Alcohol use: Yes     Comment: Occ       ROS:  Review of systems negative except as stated above.    OBJECTIVE:  /82 (BP Location: Right arm, Patient Position: Chair, Cuff Size: Adult Regular)   Pulse 83   Temp 98.6  F (37  C) (Oral)    Resp 16   Wt 117.9 kg (260 lb)   SpO2 99%   BMI 39.53 kg/m    GENERAL APPEARANCE: healthy, alert and no distress  EYES: EOMI,  PERRL, conjunctiva clear  HENT: ear canals and TM's normal.  Nose and mouth without ulcers, erythema or lesions  NECK: supple, nontender, no lymphadenopathy  RESP: lungs clear to auscultation - no rales, rhonchi or wheezes  CV: regular rates and rhythm, normal S1 S2, no murmur noted  SKIN: no suspicious lesions or rashes    Labs:    Results for orders placed or performed in visit on 08/08/22   Streptococcus A Rapid Scr w Reflx to PCR - Lab Collect     Status: Normal    Specimen: Throat; Swab   Result Value Ref Range    Group A Strep antigen Negative Negative       ASSESSMENT:    ICD-10-CM    1. Viral bronchitis  J20.8 Symptomatic; Yes; 8/2/2022 COVID-19 Virus (Coronavirus) by PCR Nose     guaiFENesin-codeine (ROBITUSSIN AC) 100-10 MG/5ML solution     benzonatate (TESSALON) 100 MG capsule   2. Viral pharyngitis  J02.9 Streptococcus A Rapid Scr w Reflx to PCR - Lab Collect     Streptococcus A Rapid Scr w Reflx to PCR - Lab Collect     Group A Streptococcus PCR Throat Swab       PLAN:  Patient Instructions   Patient was educated on the natural course of condition. Take medications as directed. Side effects discussed. Conservative measures discussed including rest, increased fluids, humidifier, and albuterol inhaler. See your primary care provider if symptoms do not improve in 7 days. Seek emergency care if you develop shortness of breath or fever over 103.    Patient verbalized understanding and is agreeable to plan. The patient was discharged ambulatory and in stable condition.    Radha Tinajero PA-C ....................  8/8/2022   3:04 PM

## 2022-08-08 NOTE — PATIENT INSTRUCTIONS
Dear Garrick Espinosa,    We are sorry you are not feeling well. Based on the responses you provided, it is recommended that you be seen in-person in urgent care so we can better evaluate your symptoms. Please click here to find the nearest urgent care location to you.   You will not be charged for this Visit. Thank you for trusting us with your care.    Yani Mckeon MD

## 2022-08-08 NOTE — PATIENT INSTRUCTIONS
Patient was educated on the natural course of condition. Take medications as directed. Side effects discussed. Conservative measures discussed including rest, increased fluids, humidifier, and albuterol inhaler. See your primary care provider if symptoms do not improve in 7 days. Seek emergency care if you develop shortness of breath or fever over 103.     No

## 2022-08-09 ENCOUNTER — VIRTUAL VISIT (OUTPATIENT)
Dept: EDUCATION SERVICES | Facility: CLINIC | Age: 62
End: 2022-08-09
Payer: COMMERCIAL

## 2022-08-09 DIAGNOSIS — E11.9 TYPE 2 DIABETES MELLITUS WITHOUT COMPLICATION, WITHOUT LONG-TERM CURRENT USE OF INSULIN (H): Primary | ICD-10-CM

## 2022-08-09 LAB — SARS-COV-2 RNA RESP QL NAA+PROBE: NEGATIVE

## 2022-08-09 PROCEDURE — 99207 PR NO BILLABLE SERVICE THIS VISIT: CPT | Performed by: DIETITIAN, REGISTERED

## 2022-08-09 NOTE — LETTER
8/9/2022         RE: Garrick Espinosa  [address]  [facility] [address]        Dear Colleague,    Thank you for referring your patient, Garrick Espinosa, to the [facility]. Please see a copy of my visit note below.    Diabetes Self-Management Education & Support    Presents for: Follow-up    Type of Visit: Video Visit    How would patient like to obtain AVS? MyChart    ASSESSMENT:    Garrick states that he was diagnosed with bronchitis and is not feeling well today, for that reason we kept our appointment brief. He states that things are going well with diabetes management. He feels that the 1.5 mg dose of Trulicity is working well, he has lost some weight and BG have come down. BG ranging from 120-180 mg/dl. No questions/concerns today.     Patient's most recent   Lab Results   Component Value Date    A1C 10.0 05/11/2022    A1C 10.2 04/21/2021    is not meeting goal of <7.0    Diabetes knowledge and skills assessment:   Patient is knowledgeable in diabetes management concepts related to: Healthy Eating, Being Active, Monitoring, Taking Medication, Problem Solving, Reducing Risks and Healthy Coping    Continue education with the following diabetes management concepts: Healthy Eating, Being Active, Monitoring, Taking Medication, Problem Solving, Reducing Risks and Healthy Coping    Based on learning assessment above, most appropriate setting for further diabetes education would be: Individual setting.    INTERVENTIONS:    Education provided today on:  AADE Self-Care Behaviors:  Monitoring: purpose, individual blood glucose targets and frequency of monitoring  Problem Solving: sick day arrangements    Opportunities for ongoing education and support in diabetes-self management were discussed. Pt verbalized understanding of concepts discussed and recommendations provided today.       Education Materials Provided:  No new materials provided today    PLAN    Continue with current  "plan    Topics to cover at upcoming visits: Healthy Eating, Being Active, Monitoring, Taking Medication, Problem Solving, Reducing Risks and Healthy Coping  Follow-up: 9/13    See Goals Section for co-developed, patient-stated behavior change goals.  AVS provided to patient today.          SUBJECTIVE / OBJECTIVE:  Presents for: Follow-up  Accompanied by: Self  Diabetes education in the past 24mo: Yes  Focus of Visit: Taking Medication  Diabetes type: Type 2  Disease course: Worsening  Transportation concerns: No  Difficulty affording diabetes medication?: No  Difficulty affording diabetes testing supplies?: No  Other concerns:: None  Cultural Influences/Ethnic Background:  Choose not to answer    Diabetes Symptoms & Complications:  Fatigue: No  Neuropathy: No  Polydipsia: No  Polyphagia: No  Polyuria: No  Visual change: Yes  Slow healing wounds: No  Symptom course: Stable  Weight trend: Decreasing  Complications assessed today?: Yes    Patient Problem List and Family Medical History reviewed for relevant medical history, current medical status, and diabetes risk factors.    Vitals:  There were no vitals taken for this visit.  Estimated body mass index is 39.53 kg/m  as calculated from the following:    Height as of 5/11/22: 1.727 m (5' 8\").    Weight as of 8/8/22: 117.9 kg (260 lb).   Last 3 BP:   BP Readings from Last 3 Encounters:   08/08/22 128/82   05/11/22 113/73   04/21/21 122/80       History   Smoking Status     Never Smoker   Smokeless Tobacco     Never Used       Labs:  Lab Results   Component Value Date    A1C 10.0 05/11/2022    A1C 10.2 04/21/2021     Lab Results   Component Value Date     05/11/2022     04/21/2021     Lab Results   Component Value Date    LDL 63 05/11/2022    LDL  04/21/2021     Cannot estimate LDL when triglyceride exceeds 400 mg/dL    LDL 86 04/21/2021     HDL Cholesterol   Date Value Ref Range Status   04/21/2021 50 >39 mg/dL Final     Direct Measure HDL   Date Value " Ref Range Status   05/11/2022 47 >=40 mg/dL Final   ]  GFR Estimate   Date Value Ref Range Status   05/11/2022 >90 >60 mL/min/1.73m2 Final     Comment:     Effective December 21, 2021 eGFRcr in adults is calculated using the 2021 CKD-EPI creatinine equation which includes age and gender (Shayna trinidad al., [place], DOI: 10.1056/KDLHyu5606950)   04/21/2021 82 >60 mL/min/[1.73_m2] Final     Comment:     Non  GFR Calc  Starting 12/18/2018, serum creatinine based estimated GFR (eGFR) will be   calculated using the Chronic Kidney Disease Epidemiology Collaboration   (CKD-EPI) equation.       GFR Estimate If Black   Date Value Ref Range Status   04/21/2021 >90 >60 mL/min/[1.73_m2] Final     Comment:      GFR Calc  Starting 12/18/2018, serum creatinine based estimated GFR (eGFR) will be   calculated using the Chronic Kidney Disease Epidemiology Collaboration   (CKD-EPI) equation.       Lab Results   Component Value Date    CR 0.92 05/11/2022    CR 1.00 04/21/2021     No results found for: MICROALBUMIN    Healthy Eating:  Healthy Eating Assessed Today: No  Cultural/Mandaeism diet restrictions?: No  Meals include: Breakfast, Lunch, Dinner  Breakfast: package of instant oatmeal (flavored- low sugar) with raisins, sometimes with orange juice OR cheerios and milk, mocha drink (drinking all morning)- espresso machine- with 2% milk and a little bit of chocolate syrup  Lunch: 1 can of soup OR leftovers OR sandwich and a few chips  Dinner: meat, vegetable, carb OR chicken kebabs with peppers and salad, sometimes with rice or potatoes  Snacks: trying to cut back on snacking - might have some nuts  Beverages: Water, Coffee drinks, Diet soda, Juice (vitamin water)  Has patient met with a dietitian in the past?: Yes    Being Active:  Being Active Assessed Today: Yes  Exercise:: Yes  Days per week of moderate to strenuous exercise (like a brisk walk): 5 (2.5-3.5 miles)  On average, minutes per day of exercise at  this level: 50  Exercise Minutes per Week: 250    Monitoring:  Monitoring Assessed Today: Yes  Did patient bring glucose meter to appointment? : Yes  Times checking blood sugar at home (number): 1  Times checking blood sugar at home (per): Day  Blood glucose trend: Decreasing    Glucose data:  BG ranging from 120-180    Taking Medications:  Diabetes Medication(s)     Biguanides       metFORMIN (GLUCOPHAGE-XR) 500 MG 24 hr tablet    TAKE 4 TABLETS BY MOUTH DAILY WTIH DINNER     Patient taking differently: Taking 1 tablet by mouth daily wtih dinner    Insulin       insulin glargine (BASAGLAR KWIKPEN) 100 UNIT/ML pen    Inject 6 Units Subcutaneous daily    Sodium-Glucose Co-Transporter 2 (SGLT2) Inhibitors       FARXIGA 10 MG TABS tablet    TAKE 1 TABLET (10 MG) BY MOUTH DAILY.    Sulfonylureas       glipiZIDE (GLUCOTROL XL) 10 MG 24 hr tablet    Take 1 tablet (10 mg) by mouth 2 times daily    Insulin Sensitizing Agents       pioglitazone (ACTOS) 45 MG tablet    Take 1 tablet (45 mg) by mouth daily    Incretin Mimetic Agents (GLP-1 Receptor Agonists)       dulaglutide (TRULICITY) 1.5 MG/0.5ML pen    Inject 1.5 mg Subcutaneous every 7 days          Taking Medication Assessed Today: Yes  Current Treatments: Non-insulin Injectables, Oral Medication (taken by mouth)  Problems taking diabetes medications regularly?: No  Diabetes medication side effects?: No (constipation, rarely diarrhea)    Problem Solving:  Problem Solving Assessed Today: Yes  Is the patient at risk for hypoglycemia?: No  Is the patient at risk for DKA?: No  Does patient have severe weather/disaster plan for diabetes management?: No  Does patient have sick day plan for diabetes management?: No              Reducing Risks:  Reducing Risks Assessed Today: Yes  Diabetes Risks: Age over 45 years, Sedentary Lifestyle  CAD Risks: Diabetes Mellitus, Family history, Male sex, Obesity, Sedentary lifestyle, Stress  Has dilated eye exam at least once a year?:  Yes  Sees dentist every 6 months?: Yes  Feet checked by healthcare provider in the last year?: Yes    Healthy Coping:  Healthy Coping Assessed Today: Yes  Patient Activation Measure Survey Score:  CHRISTIN Score (Last Two) 6/3/2011   CHRISTIN Raw Score 39   Activation Score 56.4   CHRISTIN Level 3             [facility]  Time Spent: 10 minutes  Encounter Type: Individual        Any diabetes medication dose changes were made via the Certified Diabetes Care & Education Protocol in collaboration with the patient's referring provider. A copy of this encounter was shared with the provider.

## 2022-08-09 NOTE — PROGRESS NOTES
Diabetes Self-Management Education & Support    Presents for: Follow-up    Type of Visit: Video Visit    How would patient like to obtain AVS? Minnie    ASSESSMENT:    Garrick states that he was diagnosed with bronchitis and is not feeling well today, for that reason we kept our appointment brief. He states that things are going well with diabetes management. He feels that the 1.5 mg dose of Trulicity is working well, he has lost some weight and BG have come down. BG ranging from 120-180 mg/dl. No questions/concerns today.     Patient's most recent   Lab Results   Component Value Date    A1C 10.0 05/11/2022    A1C 10.2 04/21/2021    is not meeting goal of <7.0    Diabetes knowledge and skills assessment:   Patient is knowledgeable in diabetes management concepts related to: Healthy Eating, Being Active, Monitoring, Taking Medication, Problem Solving, Reducing Risks and Healthy Coping    Continue education with the following diabetes management concepts: Healthy Eating, Being Active, Monitoring, Taking Medication, Problem Solving, Reducing Risks and Healthy Coping    Based on learning assessment above, most appropriate setting for further diabetes education would be: Individual setting.    INTERVENTIONS:    Education provided today on:  AADE Self-Care Behaviors:  Monitoring: purpose, individual blood glucose targets and frequency of monitoring  Problem Solving: sick day arrangements    Opportunities for ongoing education and support in diabetes-self management were discussed. Pt verbalized understanding of concepts discussed and recommendations provided today.       Education Materials Provided:  No new materials provided today    PLAN    Continue with current plan    Topics to cover at upcoming visits: Healthy Eating, Being Active, Monitoring, Taking Medication, Problem Solving, Reducing Risks and Healthy Coping  Follow-up: 9/13    See Goals Section for co-developed, patient-stated behavior change goals.  AVS  "provided to patient today.          SUBJECTIVE / OBJECTIVE:  Presents for: Follow-up  Accompanied by: Self  Diabetes education in the past 24mo: Yes  Focus of Visit: Taking Medication  Diabetes type: Type 2  Disease course: Worsening  Transportation concerns: No  Difficulty affording diabetes medication?: No  Difficulty affording diabetes testing supplies?: No  Other concerns:: None  Cultural Influences/Ethnic Background:  Choose not to answer    Diabetes Symptoms & Complications:  Fatigue: No  Neuropathy: No  Polydipsia: No  Polyphagia: No  Polyuria: No  Visual change: Yes  Slow healing wounds: No  Symptom course: Stable  Weight trend: Decreasing  Complications assessed today?: Yes    Patient Problem List and Family Medical History reviewed for relevant medical history, current medical status, and diabetes risk factors.    Vitals:  There were no vitals taken for this visit.  Estimated body mass index is 39.53 kg/m  as calculated from the following:    Height as of 5/11/22: 1.727 m (5' 8\").    Weight as of 8/8/22: 117.9 kg (260 lb).   Last 3 BP:   BP Readings from Last 3 Encounters:   08/08/22 128/82   05/11/22 113/73   04/21/21 122/80       History   Smoking Status     Never Smoker   Smokeless Tobacco     Never Used       Labs:  Lab Results   Component Value Date    A1C 10.0 05/11/2022    A1C 10.2 04/21/2021     Lab Results   Component Value Date     05/11/2022     04/21/2021     Lab Results   Component Value Date    LDL 63 05/11/2022    LDL  04/21/2021     Cannot estimate LDL when triglyceride exceeds 400 mg/dL    LDL 86 04/21/2021     HDL Cholesterol   Date Value Ref Range Status   04/21/2021 50 >39 mg/dL Final     Direct Measure HDL   Date Value Ref Range Status   05/11/2022 47 >=40 mg/dL Final   ]  GFR Estimate   Date Value Ref Range Status   05/11/2022 >90 >60 mL/min/1.73m2 Final     Comment:     Effective December 21, 2021 eGFRcr in adults is calculated using the 2021 CKD-EPI creatinine equation " which includes age and gender (Shayna trinidad al., NEJM, DOI: 10.1056/SICAmq6201343)   04/21/2021 82 >60 mL/min/[1.73_m2] Final     Comment:     Non  GFR Calc  Starting 12/18/2018, serum creatinine based estimated GFR (eGFR) will be   calculated using the Chronic Kidney Disease Epidemiology Collaboration   (CKD-EPI) equation.       GFR Estimate If Black   Date Value Ref Range Status   04/21/2021 >90 >60 mL/min/[1.73_m2] Final     Comment:      GFR Calc  Starting 12/18/2018, serum creatinine based estimated GFR (eGFR) will be   calculated using the Chronic Kidney Disease Epidemiology Collaboration   (CKD-EPI) equation.       Lab Results   Component Value Date    CR 0.92 05/11/2022    CR 1.00 04/21/2021     No results found for: MICROALBUMIN    Healthy Eating:  Healthy Eating Assessed Today: No  Cultural/Jehovah's witness diet restrictions?: No  Meals include: Breakfast, Lunch, Dinner  Breakfast: package of instant oatmeal (flavored- low sugar) with raisins, sometimes with orange juice OR cheerios and milk, mocha drink (drinking all morning)- espresso machine- with 2% milk and a little bit of chocolate syrup  Lunch: 1 can of soup OR leftovers OR sandwich and a few chips  Dinner: meat, vegetable, carb OR chicken kebabs with peppers and salad, sometimes with rice or potatoes  Snacks: trying to cut back on snacking - might have some nuts  Beverages: Water, Coffee drinks, Diet soda, Juice (vitamin water)  Has patient met with a dietitian in the past?: Yes    Being Active:  Being Active Assessed Today: Yes  Exercise:: Yes  Days per week of moderate to strenuous exercise (like a brisk walk): 5 (2.5-3.5 miles)  On average, minutes per day of exercise at this level: 50  Exercise Minutes per Week: 250    Monitoring:  Monitoring Assessed Today: Yes  Did patient bring glucose meter to appointment? : Yes  Times checking blood sugar at home (number): 1  Times checking blood sugar at home (per): Day  Blood glucose  trend: Decreasing    Glucose data:  BG ranging from 120-180    Taking Medications:  Diabetes Medication(s)     Biguanides       metFORMIN (GLUCOPHAGE-XR) 500 MG 24 hr tablet    TAKE 4 TABLETS BY MOUTH DAILY WTIH DINNER     Patient taking differently: Taking 1 tablet by mouth daily wtih dinner    Insulin       insulin glargine (BASAGLAR KWIKPEN) 100 UNIT/ML pen    Inject 6 Units Subcutaneous daily    Sodium-Glucose Co-Transporter 2 (SGLT2) Inhibitors       FARXIGA 10 MG TABS tablet    TAKE 1 TABLET (10 MG) BY MOUTH DAILY.    Sulfonylureas       glipiZIDE (GLUCOTROL XL) 10 MG 24 hr tablet    Take 1 tablet (10 mg) by mouth 2 times daily    Insulin Sensitizing Agents       pioglitazone (ACTOS) 45 MG tablet    Take 1 tablet (45 mg) by mouth daily    Incretin Mimetic Agents (GLP-1 Receptor Agonists)       dulaglutide (TRULICITY) 1.5 MG/0.5ML pen    Inject 1.5 mg Subcutaneous every 7 days          Taking Medication Assessed Today: Yes  Current Treatments: Non-insulin Injectables, Oral Medication (taken by mouth)  Problems taking diabetes medications regularly?: No  Diabetes medication side effects?: No (constipation, rarely diarrhea)    Problem Solving:  Problem Solving Assessed Today: Yes  Is the patient at risk for hypoglycemia?: No  Is the patient at risk for DKA?: No  Does patient have severe weather/disaster plan for diabetes management?: No  Does patient have sick day plan for diabetes management?: No              Reducing Risks:  Reducing Risks Assessed Today: Yes  Diabetes Risks: Age over 45 years, Sedentary Lifestyle  CAD Risks: Diabetes Mellitus, Family history, Male sex, Obesity, Sedentary lifestyle, Stress  Has dilated eye exam at least once a year?: Yes  Sees dentist every 6 months?: Yes  Feet checked by healthcare provider in the last year?: Yes    Healthy Coping:  Healthy Coping Assessed Today: Yes  Patient Activation Measure Survey Score:  CHRISTIN Score (Last Two) 6/3/2011   CHRISTIN Raw Score 39   Activation Score  56.4   CHRISTIN Level 3             HEATHER KimES  Time Spent: 10 minutes  Encounter Type: Individual        Any diabetes medication dose changes were made via the Certified Diabetes Care & Education Protocol in collaboration with the patient's referring provider. A copy of this encounter was shared with the provider.

## 2022-09-13 ENCOUNTER — VIRTUAL VISIT (OUTPATIENT)
Dept: EDUCATION SERVICES | Facility: CLINIC | Age: 62
End: 2022-09-13
Payer: COMMERCIAL

## 2022-09-13 DIAGNOSIS — E11.9 TYPE 2 DIABETES MELLITUS WITHOUT COMPLICATION, WITHOUT LONG-TERM CURRENT USE OF INSULIN (H): Primary | ICD-10-CM

## 2022-09-13 PROCEDURE — 98966 PH1 ASSMT&MGMT NQHP 5-10: CPT | Performed by: DIETITIAN, REGISTERED

## 2022-09-13 NOTE — PROGRESS NOTES
Diabetes Education Follow-up    Subjective/Objective:    Video visit for BG review.    Diabetes is being managed with   Diabetes Medications   Diabetes Medication(s)     Biguanides       metFORMIN (GLUCOPHAGE-XR) 500 MG 24 hr tablet    TAKE 4 TABLETS BY MOUTH DAILY WTIH DINNER     Patient taking differently: Taking 1 tablet by mouth daily wtih dinner    Insulin       insulin glargine (BASAGLAR KWIKPEN) 100 UNIT/ML pen    Inject 6 Units Subcutaneous daily    Sodium-Glucose Co-Transporter 2 (SGLT2) Inhibitors       FARXIGA 10 MG TABS tablet    TAKE 1 TABLET (10 MG) BY MOUTH DAILY.    Sulfonylureas       glipiZIDE (GLUCOTROL XL) 10 MG 24 hr tablet    Take 1 tablet (10 mg) by mouth 2 times daily    Insulin Sensitizing Agents       pioglitazone (ACTOS) 45 MG tablet    Take 1 tablet (45 mg) by mouth daily    Incretin Mimetic Agents (GLP-1 Receptor Agonists)       dulaglutide (TRULICITY) 1.5 MG/0.5ML pen    Inject 1.5 mg Subcutaneous every 7 days          BG/Food Log:   BG logs: Down below 120 mg/dl , highest 160 mg/dl     Assessment:  Garrick states that his GB have come down nicely with 1.5 mg dose of Trulicity. His BG are mostly in range, occasional high reading. Garrick has been taking on more responsibility at work so hasn't had as much time for walking. He has gained a few pounds. Recommended to continue current medication dosing. He has not started taking insulin.       Plan/Response:  Continue with current plan  Garrick to reach out if BG patterns change    Marlyn Foley RD LD [facility]  Time Spent: 6 minutes    Any diabetes medication dose changes were made via the CDE Protocol and Collaborative Practice Agreement with the patient's referring provider. A copy of this encounter was shared with the provider.

## 2022-09-13 NOTE — LETTER
9/13/2022         RE: Garrick Espinosa  [address]  [facility] [address]        Dear Colleague,    Thank you for referring your patient, Garrick Espinosa, to the [facility]. Please see a copy of my visit note below.    Diabetes Education Follow-up    Subjective/Objective:    Video visit for BG review.    Diabetes is being managed with   Diabetes Medications   Diabetes Medication(s)     Biguanides       metFORMIN (GLUCOPHAGE-XR) 500 MG 24 hr tablet    TAKE 4 TABLETS BY MOUTH DAILY WTIH DINNER     Patient taking differently: Taking 1 tablet by mouth daily wtih dinner    Insulin       insulin glargine (BASAGLAR KWIKPEN) 100 UNIT/ML pen    Inject 6 Units Subcutaneous daily    Sodium-Glucose Co-Transporter 2 (SGLT2) Inhibitors       FARXIGA 10 MG TABS tablet    TAKE 1 TABLET (10 MG) BY MOUTH DAILY.    Sulfonylureas       glipiZIDE (GLUCOTROL XL) 10 MG 24 hr tablet    Take 1 tablet (10 mg) by mouth 2 times daily    Insulin Sensitizing Agents       pioglitazone (ACTOS) 45 MG tablet    Take 1 tablet (45 mg) by mouth daily    Incretin Mimetic Agents (GLP-1 Receptor Agonists)       dulaglutide (TRULICITY) 1.5 MG/0.5ML pen    Inject 1.5 mg Subcutaneous every 7 days          BG/Food Log:   BG logs: Down below 120 mg/dl , highest 160 mg/dl     Assessment:  Garrick states that his GB have come down nicely with 1.5 mg dose of Trulicity. His BG are mostly in range, occasional high reading. Garrick has been taking on more responsibility at work so hasn't had as much time for walking. He has gained a few pounds. Recommended to continue current medication dosing. He has not started taking insulin.       Plan/Response:  Continue with current plan  Garrick to reach out if BG patterns change    Marlyn Foley RD LD [facility]  Time Spent: 6 minutes    Any diabetes medication dose changes were made via the CDE Protocol and Collaborative Practice Agreement with the patient's referring provider. A copy of this  encounter was shared with the provider.

## 2022-09-29 DIAGNOSIS — E11.9 TYPE 2 DIABETES MELLITUS WITHOUT COMPLICATION, WITHOUT LONG-TERM CURRENT USE OF INSULIN (H): ICD-10-CM

## 2022-09-29 RX ORDER — METFORMIN HCL 500 MG
TABLET, EXTENDED RELEASE 24 HR ORAL
Qty: 90 TABLET | Refills: 0 | Status: SHIPPED | OUTPATIENT
Start: 2022-09-29 | End: 2022-11-21

## 2022-09-29 RX ORDER — SIMVASTATIN 40 MG
TABLET ORAL
Qty: 90 TABLET | Refills: 0 | Status: SHIPPED | OUTPATIENT
Start: 2022-09-29 | End: 2022-11-25

## 2022-09-29 NOTE — TELEPHONE ENCOUNTER
Provider approval needed. Pt is due for lab work, A1C. Pt has been seeing the Diabetic ED.     BG logs: Down below 120 mg/dl , highest 160 mg/dl      Lab Results   Component Value Date    A1C 10.0 05/11/2022    A1C 10.2 04/21/2021    A1C 8.4 02/25/2020    A1C 8.5 07/05/2019    A1C 8.5 02/01/2019    A1C 8.5 10/20/2018     Recent Labs   Lab Test 05/11/22  0749 04/21/21  0721   CHOL 173 181   HDL 47 50   LDL 63 Cannot estimate LDL when triglyceride exceeds 400 mg/dL  86   TRIG 313* 439*

## 2022-10-30 DIAGNOSIS — E11.9 TYPE 2 DIABETES MELLITUS WITHOUT COMPLICATION, WITHOUT LONG-TERM CURRENT USE OF INSULIN (H): ICD-10-CM

## 2022-11-02 RX ORDER — DULAGLUTIDE 1.5 MG/.5ML
INJECTION, SOLUTION SUBCUTANEOUS
Qty: 1 ML | Refills: 3 | Status: SHIPPED | OUTPATIENT
Start: 2022-11-02 | End: 2023-04-10

## 2022-11-02 NOTE — TELEPHONE ENCOUNTER
Trulicity  Routing refill request to provider for review/approval because:  Labs out of range:  Hgb A1C  Lab Results   Component Value Date    A1C 10.0 05/11/2022    A1C 10.2 04/21/2021    A1C 8.4 02/25/2020    A1C 8.5 07/05/2019    A1C 8.5 02/01/2019    A1C 8.5 10/20/2018     LOV 05/11/2022

## 2022-11-08 ENCOUNTER — TELEPHONE (OUTPATIENT)
Dept: INTERNAL MEDICINE | Facility: CLINIC | Age: 62
End: 2022-11-08

## 2022-11-08 NOTE — TELEPHONE ENCOUNTER
Patient Quality Outreach      Summary:    Patient has the following on his problem list/HM:   Diabetes    Last A1C:  Lab Results   Component Value Date    A1C 10.0 05/11/2022    A1C 10.2 04/21/2021    A1C 8.4 02/25/2020       Last LDL:    Lab Results   Component Value Date    LDL 63 05/11/2022    LDL  04/21/2021     Cannot estimate LDL when triglyceride exceeds 400 mg/dL    LDL 86 04/21/2021       Is the patient on a Statin? Yes          Is the patient on Aspirin? Yes    Medications     HMG CoA Reductase Inhibitors     simvastatin (ZOCOR) 40 MG tablet       Salicylates     ASPIRIN PO             Last three blood pressure readings:  BP Readings from Last 3 Encounters:   08/08/22 128/82   05/11/22 113/73   04/21/21 122/80          Tobacco Use      Smoking status: Never      Smokeless tobacco: Never          Patient is due/failing the following:   Diabetes -  Diabetic Follow-Up Visit and Foot Exam  Asthma  -  ACT needed    Type of outreach:    Sent Mindscore message.    Questions for provider review:    None                                                                                                                                     Dionne Garza MA       Chart routed to .

## 2022-11-20 DIAGNOSIS — E11.9 TYPE 2 DIABETES MELLITUS WITHOUT COMPLICATION, WITHOUT LONG-TERM CURRENT USE OF INSULIN (H): ICD-10-CM

## 2022-11-21 RX ORDER — METFORMIN HCL 500 MG
TABLET, EXTENDED RELEASE 24 HR ORAL
Qty: 90 TABLET | Refills: 0 | Status: SHIPPED | OUTPATIENT
Start: 2022-11-21 | End: 2022-12-05

## 2022-11-21 NOTE — TELEPHONE ENCOUNTER
Pending Prescriptions:                       Disp   Refills    metFORMIN (GLUCOPHAGE XR) 500 MG 24 hr tab*90 tab*0        Sig: TAKING 1 TABLET BY MOUTH DAILY WITH DINNER  Routing refill request to provider for review/approval because:  Fails protocol

## 2022-11-28 DIAGNOSIS — E11.9 TYPE 2 DIABETES MELLITUS WITHOUT COMPLICATION, WITHOUT LONG-TERM CURRENT USE OF INSULIN (H): ICD-10-CM

## 2022-11-29 RX ORDER — BLOOD SUGAR DIAGNOSTIC
STRIP MISCELLANEOUS
Qty: 100 STRIP | Refills: 0 | Status: SHIPPED | OUTPATIENT
Start: 2022-11-29 | End: 2023-01-19

## 2022-11-29 NOTE — TELEPHONE ENCOUNTER
Pt is due for Diabetes OV, lab work. Please call pt.     Provider approval needed.     Lab Results   Component Value Date    A1C 10.0 05/11/2022    A1C 10.2 04/21/2021    A1C 8.4 02/25/2020    A1C 8.5 07/05/2019    A1C 8.5 02/01/2019    A1C 8.5 10/20/2018

## 2022-12-01 DIAGNOSIS — E11.9 TYPE 2 DIABETES MELLITUS WITHOUT COMPLICATION, WITHOUT LONG-TERM CURRENT USE OF INSULIN (H): ICD-10-CM

## 2022-12-05 RX ORDER — METFORMIN HCL 500 MG
TABLET, EXTENDED RELEASE 24 HR ORAL
Qty: 90 TABLET | Refills: 0 | Status: SHIPPED | OUTPATIENT
Start: 2022-12-05 | End: 2023-01-13

## 2022-12-05 NOTE — TELEPHONE ENCOUNTER
Pending Prescriptions:                       Disp   Refills    metFORMIN (GLUCOPHAGE XR) 500 MG 24 hr tab*90 tab*0        Sig: TAKING 1 TABLET BY MOUTH DAILY WITH DINNER    Routing refill request to provider for review/approval because:  Labs out of range:  a1c  Labs not current:  a1c    Please advise, thanks.

## 2023-01-07 ENCOUNTER — HEALTH MAINTENANCE LETTER (OUTPATIENT)
Age: 63
End: 2023-01-07

## 2023-01-13 ENCOUNTER — TELEPHONE (OUTPATIENT)
Dept: INTERNAL MEDICINE | Facility: CLINIC | Age: 63
End: 2023-01-13

## 2023-01-13 ENCOUNTER — OFFICE VISIT (OUTPATIENT)
Dept: INTERNAL MEDICINE | Facility: CLINIC | Age: 63
End: 2023-01-13
Payer: COMMERCIAL

## 2023-01-13 VITALS
HEIGHT: 68 IN | SYSTOLIC BLOOD PRESSURE: 118 MMHG | TEMPERATURE: 97.8 F | BODY MASS INDEX: 38.37 KG/M2 | WEIGHT: 253.2 LBS | RESPIRATION RATE: 14 BRPM | DIASTOLIC BLOOD PRESSURE: 68 MMHG | OXYGEN SATURATION: 96 % | HEART RATE: 96 BPM

## 2023-01-13 DIAGNOSIS — E11.9 TYPE 2 DIABETES MELLITUS WITHOUT COMPLICATION, WITHOUT LONG-TERM CURRENT USE OF INSULIN (H): Primary | ICD-10-CM

## 2023-01-13 DIAGNOSIS — M10.071 ACUTE IDIOPATHIC GOUT OF RIGHT ANKLE: ICD-10-CM

## 2023-01-13 DIAGNOSIS — E11.9 TYPE 2 DIABETES MELLITUS WITHOUT COMPLICATION, WITHOUT LONG-TERM CURRENT USE OF INSULIN (H): ICD-10-CM

## 2023-01-13 DIAGNOSIS — J45.20 MILD INTERMITTENT ASTHMA WITHOUT COMPLICATION: ICD-10-CM

## 2023-01-13 DIAGNOSIS — E66.01 MORBID OBESITY (H): ICD-10-CM

## 2023-01-13 DIAGNOSIS — I10 ESSENTIAL HYPERTENSION, BENIGN: ICD-10-CM

## 2023-01-13 DIAGNOSIS — E78.1 HYPERTRIGLYCERIDEMIA: ICD-10-CM

## 2023-01-13 LAB
CHOLEST SERPL-MCNC: 208 MG/DL
HBA1C MFR BLD: 9.6 % (ref 0–5.6)
HDLC SERPL-MCNC: 49 MG/DL
LDLC SERPL CALC-MCNC: ABNORMAL MG/DL
NONHDLC SERPL-MCNC: 159 MG/DL
TRIGL SERPL-MCNC: 438 MG/DL

## 2023-01-13 PROCEDURE — 83721 ASSAY OF BLOOD LIPOPROTEIN: CPT | Mod: 59 | Performed by: INTERNAL MEDICINE

## 2023-01-13 PROCEDURE — 80061 LIPID PANEL: CPT | Performed by: INTERNAL MEDICINE

## 2023-01-13 PROCEDURE — 83036 HEMOGLOBIN GLYCOSYLATED A1C: CPT | Performed by: INTERNAL MEDICINE

## 2023-01-13 PROCEDURE — 36415 COLL VENOUS BLD VENIPUNCTURE: CPT | Performed by: INTERNAL MEDICINE

## 2023-01-13 PROCEDURE — 99214 OFFICE O/P EST MOD 30 MIN: CPT | Performed by: INTERNAL MEDICINE

## 2023-01-13 RX ORDER — GLIPIZIDE 10 MG/1
10 TABLET, FILM COATED, EXTENDED RELEASE ORAL 2 TIMES DAILY
Qty: 180 TABLET | Refills: 1 | Status: SHIPPED | OUTPATIENT
Start: 2023-01-13 | End: 2023-07-10

## 2023-01-13 RX ORDER — METFORMIN HCL 500 MG
2000 TABLET, EXTENDED RELEASE 24 HR ORAL
Qty: 360 TABLET | Refills: 3 | Status: SHIPPED | OUTPATIENT
Start: 2023-01-13 | End: 2024-02-19

## 2023-01-13 RX ORDER — INDOMETHACIN 50 MG/1
50 CAPSULE ORAL 2 TIMES DAILY WITH MEALS
Qty: 30 CAPSULE | Refills: 0 | Status: SHIPPED | OUTPATIENT
Start: 2023-01-13

## 2023-01-13 RX ORDER — METFORMIN HCL 500 MG
2000 TABLET, EXTENDED RELEASE 24 HR ORAL
Qty: 360 TABLET | Refills: 3 | Status: SHIPPED | OUTPATIENT
Start: 2023-01-13 | End: 2023-01-13

## 2023-01-13 RX ORDER — ALBUTEROL SULFATE 90 UG/1
2 AEROSOL, METERED RESPIRATORY (INHALATION) EVERY 6 HOURS
Qty: 18 G | Refills: 3 | Status: SHIPPED | OUTPATIENT
Start: 2023-01-13

## 2023-01-13 ASSESSMENT — ASTHMA QUESTIONNAIRES
QUESTION_5 LAST FOUR WEEKS HOW WOULD YOU RATE YOUR ASTHMA CONTROL: WELL CONTROLLED
QUESTION_1 LAST FOUR WEEKS HOW MUCH OF THE TIME DID YOUR ASTHMA KEEP YOU FROM GETTING AS MUCH DONE AT WORK, SCHOOL OR AT HOME: NONE OF THE TIME
ACT_TOTALSCORE: 24
QUESTION_3 LAST FOUR WEEKS HOW OFTEN DID YOUR ASTHMA SYMPTOMS (WHEEZING, COUGHING, SHORTNESS OF BREATH, CHEST TIGHTNESS OR PAIN) WAKE YOU UP AT NIGHT OR EARLIER THAN USUAL IN THE MORNING: NOT AT ALL
QUESTION_2 LAST FOUR WEEKS HOW OFTEN HAVE YOU HAD SHORTNESS OF BREATH: NOT AT ALL
ACT_TOTALSCORE: 24
QUESTION_4 LAST FOUR WEEKS HOW OFTEN HAVE YOU USED YOUR RESCUE INHALER OR NEBULIZER MEDICATION (SUCH AS ALBUTEROL): NOT AT ALL
EMERGENCY_ROOM_LAST_YEAR_TOTAL: TWO

## 2023-01-13 ASSESSMENT — PAIN SCALES - GENERAL: PAINLEVEL: NO PAIN (0)

## 2023-01-13 NOTE — LETTER
January 16, 2023      Garrick Espinosa  [address]  [facility] [address]        Dear YamilexJesús,    We are writing to inform you of your test results. Your labs show high triglycerides and not well controlled diabetes. Try to improve diet and exercise.   Recheck HgbA1C in 3 months.   Rest of the results are normal.       Resulted Orders   HEMOGLOBIN A1C   Result Value Ref Range    Hemoglobin A1C 9.6 (H) 0.0 - 5.6 %      Comment:      Normal <5.7%   Prediabetes 5.7-6.4%    Diabetes 6.5% or higher     Note: Adopted from ADA consensus guidelines.    Narrative    Reviewed, ok with previous.      Lipid panel reflex to direct LDL Fasting   Result Value Ref Range    Cholesterol 208 (H) <200 mg/dL    Triglycerides 438 (H) <150 mg/dL    Direct Measure HDL 49 >=40 mg/dL    LDL Cholesterol Calculated        Comment:      Cannot estimate LDL when triglyceride exceeds 400 mg/dL    Non HDL Cholesterol 159 (H) <130 mg/dL    Narrative    Cholesterol  Desirable:  <200 mg/dL    Triglycerides  Normal:  Less than 150 mg/dL  Borderline High:  150-199 mg/dL  High:  200-499 mg/dL  Very High:  Greater than or equal to 500 mg/dL    Direct Measure HDL  Female:  Greater than or equal to 50 mg/dL   Male:  Greater than or equal to 40 mg/dL    LDL Cholesterol  Desirable:  <100mg/dL  Above Desirable:  100-129 mg/dL   Borderline High:  130-159 mg/dL   High:  160-189 mg/dL   Very High:  >= 190 mg/dL    Non HDL Cholesterol  Desirable:  130 mg/dL  Above Desirable:  130-159 mg/dL  Borderline High:  160-189 mg/dL  High:  190-219 mg/dL  Very High:  Greater than or equal to 220 mg/dL   LDL cholesterol direct   Result Value Ref Range    LDL Cholesterol Direct 80 <100 mg/dL      Comment:      Age 2-19 years:  Desirable: 0-110 mg/dL   Borderline high: 110-129 mg/dL   High: >= 130 mg/dL    Age 20 years and older:  Desirable: <100mg/dL  Above desirable: 100-129 mg/dL   Borderline high: 130-159 mg/dL   High: 160-189 mg/dL   Very high: >= 190 mg/dL        If you have any questions or concerns, please call the clinic at the number listed above.       Sincerely,      Ibrahima Smith MD        sil[facility]re

## 2023-01-13 NOTE — PROGRESS NOTES
"  Assessment & Plan     Type 2 diabetes mellitus without complication, without long-term current use of insulin (H)  Assess DM control  Continue treatment  - HEMOGLOBIN A1C  - glipiZIDE (GLUCOTROL XL) 10 MG 24 hr tablet; Take 1 tablet (10 mg) by mouth 2 times daily  - metFORMIN (GLUCOPHAGE XR) 500 MG 24 hr tablet; Take 4 tablets (2,000 mg) by mouth daily (with dinner) TAKING 1 TABLET BY MOUTH DAILY WITH DINNER  - Lipid panel reflex to direct LDL Fasting    Mild intermittent asthma without complication  Use PRN Albuterol   - albuterol (PROAIR HFA/PROVENTIL HFA/VENTOLIN HFA) 108 (90 Base) MCG/ACT inhaler; Inhale 2 puffs into the lungs every 6 hours    Acute idiopathic gout of right ankle  Use PRN Indocin   - indomethacin (INDOCIN) 50 MG capsule; Take 1 capsule (50 mg) by mouth 2 times daily (with meals)    Hypertriglyceridemia  Assess lipids   - Lipid panel reflex to direct LDL Fasting    Morbid obesity (H)  Weight loss advised     Essential hypertension, benign  Controlled on treatment                BMI:   Estimated body mass index is 38.5 kg/m  as calculated from the following:    Height as of this encounter: 1.727 m (5' 8\").    Weight as of this encounter: 114.9 kg (253 lb 3.2 oz).   Weight management plan: Discussed healthy diet and exercise guidelines    See Patient Instructions    Return in about 6 months (around 7/13/2023) for Physical Exam.    Ibrahima Smith MD  [facility] ISI Mccauley is a 62 year old, presenting for the following health issues:  RECHECK and Diabetes      History of Present Illness       Diabetes:   He presents for follow up of diabetes.  He is checking home blood glucose a few times a month. He checks blood glucose before meals.  Blood glucose is never over 200 and never under 70. When his blood glucose is low, the patient is asymptomatic for confusion, blurred vision, lethargy and reports not feeling dizzy, shaky, or weak.  He has no concerns " "regarding his diabetes at this time.  He is not experiencing numbness or burning in feet, excessive thirst, blurry vision, weight changes or redness, sores or blisters on feet.         He eats 2-3 servings of fruits and vegetables daily.He consumes 0 sweetened beverage(s) daily.He exercises with enough effort to increase his heart rate 9 or less minutes per day.  He exercises with enough effort to increase his heart rate 3 or less days per week.   He is taking medications regularly.     Patient is seen for a follow up visit.  No acute complaints, no medication change or new medical conditions.  Has H/O DM. On diet , exercise and medical treatment. Blood sugars are controlled. No parestesias. No hypoglycemias.  Has h/o HTN. on medical treatment. BP has been controlled. No side effects from medications. No CP, HA, dizziness. good compliance with medications and low salt diet.  Has H/O hyperlipidemia. On medical treatment and diet. No side effects. No muscle weakness, myalgias or upset stomach.   Has h/o asthma, mild, no flare ups. Uses PRN Albuterol.   Has h/o gout, no flare ups. On PRN Indocin       BP Readings from Last 2 Encounters:   01/13/23 118/68   08/08/22 128/82     Hemoglobin A1C (%)   Date Value   05/11/2022 10.0 (H)   04/21/2021 10.2 (H)   02/25/2020 8.4 (H)     LDL Cholesterol Calculated (mg/dL)   Date Value   05/11/2022 63   04/21/2021     Cannot estimate LDL when triglyceride exceeds 400 mg/dL   02/25/2020 83     LDL Cholesterol Direct (mg/dL)   Date Value   04/21/2021 86             Review of Systems   Constitutional, HEENT, cardiovascular, pulmonary, gi and gu systems are negative, except as otherwise noted.      Objective    /68 (BP Location: Left arm, Cuff Size: Adult Large)   Pulse 96   Temp 97.8  F (36.6  C) (Oral)   Resp 14   Ht 1.727 m (5' 8\")   Wt 114.9 kg (253 lb 3.2 oz)   SpO2 96%   BMI 38.50 kg/m    Body mass index is 38.5 kg/m .  Physical Exam   GENERAL: obese, alert and no " distress  EYES: Eyes grossly normal to inspection, PERRL and conjunctivae and sclerae normal  HENT: ear canals and TM's normal, nose and mouth without ulcers or lesions  NECK: no adenopathy, no asymmetry, masses, or scars and thyroid normal to palpation  RESP: lungs clear to auscultation - no rales, rhonchi , mild expiratory wheezes  CV: regular rate and rhythm, normal S1 S2, no S3 or S4, no murmur, click or rub,  edema and peripheral pulses strong  ABDOMEN: soft,obese, nontender, no hepatosplenomegaly, no masses and bowel sounds normal  MS: no gross musculoskeletal defects noted, trace LE edema  SKIN: no suspicious lesions or rashes  NEURO: Normal strength and tone, mentation intact and speech normal    Office Visit on 08/08/2022   Component Date Value Ref Range Status     SARS CoV2 PCR 08/08/2022 Negative  Negative Final    NEGATIVE: SARS-CoV-2 (COVID-19) RNA not detected, presumed negative.     Group A Strep antigen 08/08/2022 Negative  Negative Final     Group A strep by PCR 08/08/2022 Not Detected  Not Detected Final

## 2023-01-14 LAB — LDLC SERPL DIRECT ASSAY-MCNC: 80 MG/DL

## 2023-01-19 DIAGNOSIS — E11.9 TYPE 2 DIABETES MELLITUS WITHOUT COMPLICATION, WITHOUT LONG-TERM CURRENT USE OF INSULIN (H): ICD-10-CM

## 2023-01-19 RX ORDER — BLOOD SUGAR DIAGNOSTIC
STRIP MISCELLANEOUS
Qty: 200 STRIP | Refills: 3 | Status: SHIPPED | OUTPATIENT
Start: 2023-01-19

## 2023-02-27 DIAGNOSIS — E11.9 TYPE 2 DIABETES MELLITUS WITHOUT COMPLICATION, WITHOUT LONG-TERM CURRENT USE OF INSULIN (H): ICD-10-CM

## 2023-02-27 RX ORDER — DAPAGLIFLOZIN 10 MG/1
10 TABLET, FILM COATED ORAL DAILY
Qty: 90 TABLET | Refills: 0 | Status: SHIPPED | OUTPATIENT
Start: 2023-02-27 | End: 2023-06-01

## 2023-03-07 DIAGNOSIS — E11.9 TYPE 2 DIABETES MELLITUS WITHOUT COMPLICATION, WITHOUT LONG-TERM CURRENT USE OF INSULIN (H): ICD-10-CM

## 2023-03-08 RX ORDER — PIOGLITAZONEHYDROCHLORIDE 45 MG/1
TABLET ORAL
Qty: 90 TABLET | Refills: 0 | Status: SHIPPED | OUTPATIENT
Start: 2023-03-08 | End: 2023-06-07

## 2023-03-08 NOTE — TELEPHONE ENCOUNTER
Pending Prescriptions:                       Disp   Refills    pioglitazone (ACTOS) 45 MG tablet [Pharma*90 tab*0            Sig: TAKE 1 TABLET BY MOUTH EVERY DAY    Medication is being filled for 1 time refill only due to:  patient due for labs in May

## 2023-03-16 ENCOUNTER — TELEPHONE (OUTPATIENT)
Dept: INTERNAL MEDICINE | Facility: CLINIC | Age: 63
End: 2023-03-16
Payer: COMMERCIAL

## 2023-03-16 NOTE — TELEPHONE ENCOUNTER
Patient Quality Outreach    Patient is due for the following:   Diabetes -  Foot Exam  Hypertension -      Next Steps:   No follow up needed at this time.    Type of outreach:    Chart review performed, no outreach needed.      Questions for provider review:         Dionne Kendall MA

## 2023-03-28 NOTE — TELEPHONE ENCOUNTER
Patient was seen 7/24/18 and was advised to follow up in 6 months. Refill request last month states patient is due for appointment and labs and was only refilled for 1 month. Please advise what patient needs to be seen for. Called patient and left message before I realized that patient was seen within last 6 month. Will await MD recommendation.    Daily Fractionated Dose (Optional- Include Units): 277.84cGy

## 2023-04-08 DIAGNOSIS — E11.9 TYPE 2 DIABETES MELLITUS WITHOUT COMPLICATION, WITHOUT LONG-TERM CURRENT USE OF INSULIN (H): ICD-10-CM

## 2023-04-10 RX ORDER — DULAGLUTIDE 1.5 MG/.5ML
INJECTION, SOLUTION SUBCUTANEOUS
Qty: 2 ML | Refills: 3 | Status: SHIPPED | OUTPATIENT
Start: 2023-04-10 | End: 2023-09-26

## 2023-04-10 NOTE — TELEPHONE ENCOUNTER
Pending Prescriptions:                       Disp   Refills    TRULICITY 1.5 MG/0.5ML pen [Pharmacy Med N*       3        Sig: INJECT 1.5 MG SUBCUTANEOUSLY EVERY 7 DAYS    Routing refill request to provider for review/approval because:  Need quantity entered into prescription.    Please advise, thanks.

## 2023-04-20 ENCOUNTER — PATIENT OUTREACH (OUTPATIENT)
Dept: CARE COORDINATION | Facility: CLINIC | Age: 63
End: 2023-04-20
Payer: COMMERCIAL

## 2023-05-30 DIAGNOSIS — E11.9 TYPE 2 DIABETES MELLITUS WITHOUT COMPLICATION, WITHOUT LONG-TERM CURRENT USE OF INSULIN (H): ICD-10-CM

## 2023-05-30 DIAGNOSIS — I10 ESSENTIAL HYPERTENSION, BENIGN: ICD-10-CM

## 2023-05-31 ENCOUNTER — OFFICE VISIT (OUTPATIENT)
Dept: INTERNAL MEDICINE | Facility: CLINIC | Age: 63
End: 2023-05-31
Payer: COMMERCIAL

## 2023-05-31 VITALS
HEART RATE: 94 BPM | TEMPERATURE: 97.6 F | RESPIRATION RATE: 18 BRPM | OXYGEN SATURATION: 97 % | SYSTOLIC BLOOD PRESSURE: 122 MMHG | DIASTOLIC BLOOD PRESSURE: 84 MMHG | WEIGHT: 260 LBS | BODY MASS INDEX: 39.4 KG/M2 | HEIGHT: 68 IN

## 2023-05-31 DIAGNOSIS — E66.01 MORBID OBESITY (H): ICD-10-CM

## 2023-05-31 DIAGNOSIS — Z00.00 ENCOUNTER FOR PREVENTATIVE ADULT HEALTH CARE EXAMINATION: Primary | ICD-10-CM

## 2023-05-31 DIAGNOSIS — I10 ESSENTIAL HYPERTENSION, BENIGN: ICD-10-CM

## 2023-05-31 DIAGNOSIS — Z12.5 SCREENING FOR PROSTATE CANCER: ICD-10-CM

## 2023-05-31 DIAGNOSIS — E11.9 TYPE 2 DIABETES MELLITUS WITHOUT COMPLICATION, WITHOUT LONG-TERM CURRENT USE OF INSULIN (H): ICD-10-CM

## 2023-05-31 LAB
ALBUMIN UR-MCNC: NEGATIVE MG/DL
APPEARANCE UR: CLEAR
BACTERIA #/AREA URNS HPF: ABNORMAL /HPF
BILIRUB UR QL STRIP: NEGATIVE
COLOR UR AUTO: YELLOW
ERYTHROCYTE [DISTWIDTH] IN BLOOD BY AUTOMATED COUNT: 14.2 % (ref 10–15)
GLUCOSE UR STRIP-MCNC: 500 MG/DL
HBA1C MFR BLD: 7.6 % (ref 0–5.6)
HCT VFR BLD AUTO: 42.1 % (ref 40–53)
HGB BLD-MCNC: 14.1 G/DL (ref 13.3–17.7)
HGB UR QL STRIP: NEGATIVE
KETONES UR STRIP-MCNC: NEGATIVE MG/DL
LEUKOCYTE ESTERASE UR QL STRIP: NEGATIVE
MCH RBC QN AUTO: 28.7 PG (ref 26.5–33)
MCHC RBC AUTO-ENTMCNC: 33.5 G/DL (ref 31.5–36.5)
MCV RBC AUTO: 86 FL (ref 78–100)
NITRATE UR QL: NEGATIVE
PH UR STRIP: 5 [PH] (ref 5–7)
PLATELET # BLD AUTO: 292 10E3/UL (ref 150–450)
RBC # BLD AUTO: 4.91 10E6/UL (ref 4.4–5.9)
RBC #/AREA URNS AUTO: ABNORMAL /HPF
SP GR UR STRIP: 1.02 (ref 1–1.03)
SQUAMOUS #/AREA URNS AUTO: ABNORMAL /LPF
UROBILINOGEN UR STRIP-ACNC: 0.2 E.U./DL
WBC # BLD AUTO: 6.3 10E3/UL (ref 4–11)
WBC #/AREA URNS AUTO: ABNORMAL /HPF

## 2023-05-31 PROCEDURE — 84443 ASSAY THYROID STIM HORMONE: CPT | Performed by: INTERNAL MEDICINE

## 2023-05-31 PROCEDURE — 82570 ASSAY OF URINE CREATININE: CPT | Performed by: INTERNAL MEDICINE

## 2023-05-31 PROCEDURE — 80061 LIPID PANEL: CPT | Performed by: INTERNAL MEDICINE

## 2023-05-31 PROCEDURE — 80053 COMPREHEN METABOLIC PANEL: CPT | Performed by: INTERNAL MEDICINE

## 2023-05-31 PROCEDURE — 81001 URINALYSIS AUTO W/SCOPE: CPT | Performed by: INTERNAL MEDICINE

## 2023-05-31 PROCEDURE — 85027 COMPLETE CBC AUTOMATED: CPT | Performed by: INTERNAL MEDICINE

## 2023-05-31 PROCEDURE — 83036 HEMOGLOBIN GLYCOSYLATED A1C: CPT | Performed by: INTERNAL MEDICINE

## 2023-05-31 PROCEDURE — 99396 PREV VISIT EST AGE 40-64: CPT | Performed by: INTERNAL MEDICINE

## 2023-05-31 PROCEDURE — 82043 UR ALBUMIN QUANTITATIVE: CPT | Performed by: INTERNAL MEDICINE

## 2023-05-31 PROCEDURE — 99213 OFFICE O/P EST LOW 20 MIN: CPT | Mod: 25 | Performed by: INTERNAL MEDICINE

## 2023-05-31 PROCEDURE — G0103 PSA SCREENING: HCPCS | Performed by: INTERNAL MEDICINE

## 2023-05-31 PROCEDURE — 36415 COLL VENOUS BLD VENIPUNCTURE: CPT | Performed by: INTERNAL MEDICINE

## 2023-05-31 ASSESSMENT — ENCOUNTER SYMPTOMS
DIZZINESS: 0
NERVOUS/ANXIOUS: 0
NAUSEA: 0
DIARRHEA: 0
SORE THROAT: 0
WEAKNESS: 0
SHORTNESS OF BREATH: 0
FEVER: 0
HEADACHES: 0
HEMATURIA: 0
COUGH: 0
ABDOMINAL PAIN: 0
HEMATOCHEZIA: 0
FREQUENCY: 0
CONSTIPATION: 0
CHILLS: 0
ARTHRALGIAS: 0
PARESTHESIAS: 1
HEARTBURN: 0
JOINT SWELLING: 0
EYE PAIN: 0
MYALGIAS: 0
PALPITATIONS: 0
DYSURIA: 0

## 2023-05-31 ASSESSMENT — ASTHMA QUESTIONNAIRES
ACT_TOTALSCORE: 23
QUESTION_2 LAST FOUR WEEKS HOW OFTEN HAVE YOU HAD SHORTNESS OF BREATH: NOT AT ALL
QUESTION_1 LAST FOUR WEEKS HOW MUCH OF THE TIME DID YOUR ASTHMA KEEP YOU FROM GETTING AS MUCH DONE AT WORK, SCHOOL OR AT HOME: A LITTLE OF THE TIME
QUESTION_4 LAST FOUR WEEKS HOW OFTEN HAVE YOU USED YOUR RESCUE INHALER OR NEBULIZER MEDICATION (SUCH AS ALBUTEROL): NOT AT ALL
ACT_TOTALSCORE: 23
QUESTION_5 LAST FOUR WEEKS HOW WOULD YOU RATE YOUR ASTHMA CONTROL: WELL CONTROLLED
QUESTION_3 LAST FOUR WEEKS HOW OFTEN DID YOUR ASTHMA SYMPTOMS (WHEEZING, COUGHING, SHORTNESS OF BREATH, CHEST TIGHTNESS OR PAIN) WAKE YOU UP AT NIGHT OR EARLIER THAN USUAL IN THE MORNING: NOT AT ALL

## 2023-05-31 ASSESSMENT — PAIN SCALES - GENERAL: PAINLEVEL: NO PAIN (0)

## 2023-05-31 NOTE — PROGRESS NOTES
Assessment & Plan     Type 2 diabetes mellitus without complication, without long-term current use of insulin (H)  Assess lab work for diabetic control   - Albumin Random Urine Quantitative with Creat Ratio  - Hemoglobin A1c  - Lipid panel reflex to direct LDL Fasting  - OFFICE/OUTPT VISIT,EST,LEVL III    Essential hypertension, benign  Controlled on treatment   - CBC with platelets  - Comprehensive metabolic panel (BMP + Alb, Alk Phos, ALT, AST, Total. Bili, TP)  - UA with Microscopic reflex to Culture - lab collect  - Lipid panel reflex to direct LDL Fasting  - TSH with free T4 reflex  - OFFICE/OUTPT VISIT,EST,LEVL III    Encounter for preventative adult health care examination  advised regular aerobic activity, low cholesterol, low salt diet, wearing seat belt,  self examinations, sunscreen protection.Obtain screening cholesterol, immunizations reviewed.    - Hemoglobin A1c  - CBC with platelets  - Comprehensive metabolic panel (BMP + Alb, Alk Phos, ALT, AST, Total. Bili, TP)  - PSA, screen  - UA with Microscopic reflex to Culture - lab collect  - Lipid panel reflex to direct LDL Fasting  - TSH with free T4 reflex    Screening for prostate cancer    - PSA, screen    Morbid obesity (H)  Weight loss advised           PATIENT IS SEEN FOR PHYSICAL EXAMINATION    CHIEF COMPLAINT:   1. Encounter for preventative adult health care examination    2. Type 2 diabetes mellitus without complication, without long-term current use of insulin (H)    3. Essential hypertension, benign    4. Screening for prostate cancer    5. Morbid obesity (H)        PMH:   Past Medical History:   Diagnosis Date     Allergic rhinitis, cause unspecified      Diabetes mellitus (H) 6/09      Essential hypertension, benign      Other and unspecified hyperlipidemia      Psoriasis      Sciatica     R leg radiation; remote;; resolved        PSH:   Past Surgical History:   Procedure Laterality Date     COLONOSCOPY N/A 3/29/2019    Procedure: COMBINED  COLONOSCOPY, SINGLE OR MULTIPLE BIOPSY/POLYPECTOMY BY BIOPSY polypectomy of colon polyps by cold jumbo biopsies;  Surgeon: Jeff Elias MD;  Location:  GI     NECK SURGERY  2011    hemilaminectomy      Z NONSPECIFIC PROCEDURE  12/06    R ankle surgery        MEDICATIONS:   Current Outpatient Medications:      albuterol (PROAIR HFA/PROVENTIL HFA/VENTOLIN HFA) 108 (90 Base) MCG/ACT inhaler, Inhale 2 puffs into the lungs every 6 hours, Disp: 18 g, Rfl: 3     ASPIRIN PO, Take 162 mg by mouth daily, Disp: , Rfl:      blood glucose calibration (NO BRAND SPECIFIED) solution, To accompany: Blood Glucose Monitor Brands: per insurance., Disp: 1 Bottle, Rfl: 3     blood glucose monitoring (ONE TOUCH ULTRA 2) meter device kit, USE TO TEST BLOOD SUGAR 1-2 TIMES DAILY OR AS DIRECTED., Disp: 1 kit, Rfl: 0     dapagliflozin (FARXIGA) 10 MG TABS tablet, Take 1 tablet (10 mg) by mouth daily, Disp: 90 tablet, Rfl: 0     doxylamine (UNISOM) 25 MG TABS tablet, Take 25 mg by mouth At Bedtime, Disp: , Rfl:      glipiZIDE (GLUCOTROL XL) 10 MG 24 hr tablet, Take 1 tablet (10 mg) by mouth 2 times daily, Disp: 180 tablet, Rfl: 1     hydrochlorothiazide (HYDRODIURIL) 25 MG tablet, Take 1 tablet (25 mg) by mouth daily, Disp: 90 tablet, Rfl: 2     indomethacin (INDOCIN) 50 MG capsule, Take 1 capsule (50 mg) by mouth 2 times daily (with meals), Disp: 30 capsule, Rfl: 0     losartan (COZAAR) 50 MG tablet, TAKE 2 TABLETS BY MOUTH EVERY DAY, Disp: 180 tablet, Rfl: 3     MELATONIN PO, Take by mouth nightly as needed, Disp: , Rfl:      metFORMIN (GLUCOPHAGE XR) 500 MG 24 hr tablet, Take 4 tablets (2,000 mg) by mouth daily (with dinner), Disp: 360 tablet, Rfl: 3     MULTI-DAY VITAMINS OR TABS, 1 TABLET DAILY, Disp: , Rfl:      ONETOUCH ULTRA test strip, USE TO TEST BLOOD SUGAR 1-2 TIMES DAILY OR AS DIRECTED., Disp: 200 strip, Rfl: 3     pioglitazone (ACTOS) 45 MG tablet, TAKE 1 TABLET BY MOUTH EVERY DAY, Disp: 90 tablet, Rfl: 0      simvastatin (ZOCOR) 40 MG tablet, TAKE 1 TABLET BY MOUTH EVERYDAY AT BEDTIME, Disp: 90 tablet, Rfl: 1     Thiamine HCl (VITAMIN B-1 PO), Take by mouth daily Taking on summer time only, Disp: , Rfl:      thin (NO BRAND SPECIFIED) lancets, Use with lanceting device. To accompany: Blood Glucose Monitor Brands: per insurance., Disp: 100 each, Rfl: 2     triamcinolone (KENALOG) 0.1 % external cream, APPLY THIN LAYER TO RASH ON SKIN TWICE DAILY FOR NO MORE THAN 10-14 DAYS, Disp: 80 g, Rfl: 3     TRULICITY 1.5 MG/0.5ML pen, INJECT 1.5 MG SUBCUTANEOUSLY EVERY 7 DAYS, Disp: 2 mL, Rfl: 3     TYLENOL EXTRA STRENGTH 500 MG PO TABS, , Disp: , Rfl:     SOCIAL HISTORY:   Social History     Socioeconomic History     Marital status:      Spouse name: Enriqueta     Number of children: 2     Years of education: Not on file     Highest education level: Not on file   Occupational History     Occupation: Trainer     Employer: [email or web address]     Comment: Trainer for property/casualty insurance employees   Tobacco Use     Smoking status: Never     Smokeless tobacco: Never   Vaping Use     Vaping status: Never Used   Substance and Sexual Activity     Alcohol use: Yes     Comment: Occ     Drug use: No     Sexual activity: Yes     Partners: Female     Birth control/protection: Pill   Other Topics Concern     Parent/sibling w/ CABG, MI or angioplasty before 65F 55M? No   Social History Narrative    3x/week for 45 minutes, aerobic and weight training     Social Determinants of Health     Financial Resource Strain: Not on file   Food Insecurity: Not on file   Transportation Needs: Not on file   Physical Activity: Not on file   Stress: Not on file   Social Connections: Not on file   Intimate Partner Violence: Not on file   Housing Stability: Not on file       FAMILY HISTORY:   Family History   Problem Relation Age of Onset     Diabetes Father         Born 1935, also HTN     Neurologic Disorder Mother         Born ~1937, a few small  strokes     Cerebrovascular Disease Mother        ALLERGIES:   Allergies as of 05/31/2023 - Reviewed 05/31/2023   Allergen Reaction Noted     Ace inhibitors  07/01/2009     Cats  05/25/2011     Dogs  05/25/2011     Quinapril hcl  02/07/2011     Seasonal allergies  05/25/2011       PREVENTIVE:discussed:  -immunizations  -lipid profile  -colonoscopy  -PSA  -diet    EXAM: SUBJECTIVE:  Garrick Espinosa, a 62 year old male scheduled an appointment to discuss the following issues:     Type 2 diabetes mellitus without complication, without long-term current use of insulin (H)  Essential hypertension, benign  Encounter for preventative adult health care examination  Screening for prostate cancer  Morbid obesity (H)    Medical, social, surgical, and family histories reviewed.    Subjective   Garrick is a 62 year old, presenting for the following health issues:  Physical        5/31/2023     3:53 PM   Additional Questions   Roomed by Dionne GAINES   Accompanied by n/a     Forms 5/31/2023   Any forms needing to be completed Yes     Healthy Habits:     Getting at least 3 servings of Calcium per day:  Yes    Bi-annual eye exam:  Yes    Dental care twice a year:  Yes    Sleep apnea or symptoms of sleep apnea:  None    Diet:  Regular (no restrictions)    Frequency of exercise:  2-3 days/week    Duration of exercise:  30-45 minutes    Taking medications regularly:  Yes    Medication side effects:  None    PHQ-2 Total Score: 0    Additional concerns today:  Yes         Has H/O DM. On diet , exercise and oral treatment. Blood sugars are controlled. No parestesias. No hypoglycemias.  Has h/o HTN. on medical treatment. BP has been controlled. No side effects from medications. No CP, HA, dizziness. good compliance with medications and low salt diet.  Needs form filled in for summer camp.       Review of Systems   Constitutional: Negative for chills and fever.   HENT: Negative for congestion, ear pain, hearing loss and sore throat.    Eyes:  "Negative for pain and visual disturbance.   Respiratory: Negative for cough and shortness of breath.    Cardiovascular: Negative for chest pain, palpitations and peripheral edema.   Gastrointestinal: Negative for abdominal pain, constipation, diarrhea, heartburn, hematochezia and nausea.   Genitourinary: Negative for dysuria, frequency, genital sores, hematuria, impotence, penile discharge and urgency.   Musculoskeletal: Negative for arthralgias, joint swelling and myalgias.   Skin: Negative for rash.   Neurological: Positive for paresthesias. Negative for dizziness, weakness and headaches.   Psychiatric/Behavioral: Negative for mood changes. The patient is not nervous/anxious.             Objective    /84 (BP Location: Left arm, Cuff Size: Adult Large)   Pulse 94   Temp 97.6  F (36.4  C) (Tympanic)   Resp 18   Ht 1.727 m (5' 8\")   Wt 117.9 kg (260 lb)   SpO2 97%   BMI 39.53 kg/m    Body mass index is 39.53 kg/m .  Physical Exam   GENERAL: obese,  alert and no distress  EYES: Eyes grossly normal to inspection, PERRL and conjunctivae and sclerae normal  HENT: ear canals and TM's normal, nose and mouth without ulcers or lesions  NECK: no adenopathy, no asymmetry, masses, or scars and thyroid normal to palpation  RESP: lungs clear to auscultation - no rales, rhonchi or wheezes  CV: regular rate and rhythm, normal S1 S2, no S3 or S4, no murmur, click or rub, no peripheral edema and peripheral pulses strong  ABDOMEN: soft, nontender, no hepatosplenomegaly, no masses and bowel sounds normal  MS: no gross musculoskeletal defects noted, no edema  SKIN: no suspicious lesions or rashes  NEURO: Normal strength and tone, mentation intact and speech normal  PSYCH: mentation appears normal, affect normal/bright    Office Visit on 01/13/2023   Component Date Value Ref Range Status     Hemoglobin A1C 01/13/2023 9.6 (H)  0.0 - 5.6 % Final    Normal <5.7%   Prediabetes 5.7-6.4%    Diabetes 6.5% or higher     Note: " Adopted from ADA consensus guidelines.     Cholesterol 01/13/2023 208 (H)  <200 mg/dL Final     Triglycerides 01/13/2023 438 (H)  <150 mg/dL Final     Direct Measure HDL 01/13/2023 49  >=40 mg/dL Final     LDL Cholesterol Calculated 01/13/2023    Final    Cannot estimate LDL when triglyceride exceeds 400 mg/dL     Non HDL Cholesterol 01/13/2023 159 (H)  <130 mg/dL Final     LDL Cholesterol Direct 01/13/2023 80  <100 mg/dL Final    Age 2-19 years:  Desirable: 0-110 mg/dL   Borderline high: 110-129 mg/dL   High: >= 130 mg/dL    Age 20 years and older:  Desirable: <100mg/dL  Above desirable: 100-129 mg/dL   Borderline high: 130-159 mg/dL   High: 160-189 mg/dL   Very high: >= 190 mg/dL

## 2023-05-31 NOTE — LETTER
June 2, 2023      Garrick YULISSA Espinosa  [address]  [facility] [address]        Dear ,    We are writing to inform you of your test results. Your diabetic control is improved, but still need to work on diet and exercise. Continue current treatment.   Recheck in 6 months   Rest of the results are normal.     Resulted Orders   Albumin Random Urine Quantitative with Creat Ratio   Result Value Ref Range    Creatinine Urine mg/dL 118.0 mg/dL      Comment:      The reference ranges have not been established in urine creatinine. The results should be integrated into the clinical context for interpretation.    Albumin Urine mg/L <12.0 mg/L      Comment:      The reference ranges have not been established in urine albumin. The results should be integrated into the clinical context for interpretation.    Albumin Urine mg/g Cr        Comment:      Unable to calculate, urine albumin and/or urine creatinine is outside detectable limits.  Microalbuminuria is defined as an albumin:creatinine ratio of 17 to 299 for males and 25 to 299 for females. A ratio of albumin:creatinine of 300 or higher is indicative of overt proteinuria.  Due to biologic variability, positive results should be confirmed by a second, first-morning random or 24-hour timed urine specimen. If there is discrepancy, a third specimen is recommended. When 2 out of 3 results are in the microalbuminuria range, this is evidence for incipient nephropathy and warrants increased efforts at glucose control, blood pressure control, and institution of therapy with an angiotensin-converting-enzyme (ACE) inhibitor (if the patient can tolerate it).     Hemoglobin A1c   Result Value Ref Range    Hemoglobin A1C 7.6 (H) 0.0 - 5.6 %   CBC with platelets   Result Value Ref Range    WBC Count 6.3 4.0 - 11.0 10e3/uL    RBC Count 4.91 4.40 - 5.90 10e6/uL    Hemoglobin 14.1 13.3 - 17.7 g/dL    Hematocrit 42.1 40.0 - 53.0 %    MCV 86 78 - 100 fL    MCH 28.7 26.5 - 33.0 pg     MCHC 33.5 31.5 - 36.5 g/dL    RDW 14.2 10.0 - 15.0 %    Platelet Count 292 150 - 450 10e3/uL   Comprehensive metabolic panel (BMP + Alb, Alk Phos, ALT, AST, Total. Bili, TP)   Result Value Ref Range    Sodium 140 136 - 145 mmol/L    Potassium 4.4 3.4 - 5.3 mmol/L    Chloride 104 98 - 107 mmol/L    Carbon Dioxide (CO2) 20 (L) 22 - 29 mmol/L    Anion Gap 16 (H) 7 - 15 mmol/L    Urea Nitrogen 19.4 8.0 - 23.0 mg/dL    Creatinine 1.04 0.67 - 1.17 mg/dL    Calcium 9.3 8.8 - 10.2 mg/dL    Glucose 91 70 - 99 mg/dL    Alkaline Phosphatase 54 40 - 129 U/L    AST 30 10 - 50 U/L    ALT 15 10 - 50 U/L    Protein Total 7.3 6.4 - 8.3 g/dL    Albumin 4.4 3.5 - 5.2 g/dL    Bilirubin Total 0.3 <=1.2 mg/dL    GFR Estimate 81 >60 mL/min/1.73m2      Comment:      eGFR calculated using 2021 CKD-EPI equation.   PSA, screen   Result Value Ref Range    Prostate Specific Antigen Screen 0.36 0.00 - 4.50 ng/mL    Narrative    This result is obtained using the Roche Elecsys total PSA method on the geronimo [id number] immunoassay analyzer. Results obtained with different assay methods or kits cannot be used interchangeably.   UA with Microscopic reflex to Culture - lab collect   Result Value Ref Range    Color Urine Yellow Colorless, Straw, Light Yellow, Yellow    Appearance Urine Clear Clear    Glucose Urine 500 (A) Negative mg/dL    Bilirubin Urine Negative Negative    Ketones Urine Negative Negative mg/dL    Specific Gravity Urine 1.020 1.003 - 1.035    Blood Urine Negative Negative    pH Urine 5.0 5.0 - 7.0    Protein Albumin Urine Negative Negative mg/dL    Urobilinogen Urine 0.2 0.2, 1.0 E.U./dL    Nitrite Urine Negative Negative    Leukocyte Esterase Urine Negative Negative   Lipid panel reflex to direct LDL Fasting   Result Value Ref Range    Cholesterol 167 <200 mg/dL    Triglycerides 247 (H) <150 mg/dL    Direct Measure HDL 50 >=40 mg/dL    LDL Cholesterol Calculated 68 <=100 mg/dL    Non HDL Cholesterol 117 <130 mg/dL    Narrative     Cholesterol  Desirable:  <200 mg/dL    Triglycerides  Normal:  Less than 150 mg/dL  Borderline High:  150-199 mg/dL  High:  200-499 mg/dL  Very High:  Greater than or equal to 500 mg/dL    Direct Measure HDL  Female:  Greater than or equal to 50 mg/dL   Male:  Greater than or equal to 40 mg/dL    LDL Cholesterol  Desirable:  <100mg/dL  Above Desirable:  100-129 mg/dL   Borderline High:  130-159 mg/dL   High:  160-189 mg/dL   Very High:  >= 190 mg/dL    Non HDL Cholesterol  Desirable:  130 mg/dL  Above Desirable:  130-159 mg/dL  Borderline High:  160-189 mg/dL  High:  190-219 mg/dL  Very High:  Greater than or equal to 220 mg/dL   TSH with free T4 reflex   Result Value Ref Range    TSH 3.61 0.30 - 4.20 uIU/mL   UA Microscopic with Reflex to Culture   Result Value Ref Range    Bacteria Urine Few (A) None Seen /HPF    RBC Urine 0-2 0-2 /HPF /HPF    WBC Urine 0-5 0-5 /HPF /HPF    Squamous Epithelials Urine Few (A) None Seen /LPF    Narrative    Urine Culture not indicated       If you have any questions or concerns, please call the clinic at the number listed above.       Sincerely,      Ibrahima Smith MD

## 2023-06-01 LAB
ALBUMIN SERPL BCG-MCNC: 4.4 G/DL (ref 3.5–5.2)
ALP SERPL-CCNC: 54 U/L (ref 40–129)
ALT SERPL W P-5'-P-CCNC: 15 U/L (ref 10–50)
ANION GAP SERPL CALCULATED.3IONS-SCNC: 16 MMOL/L (ref 7–15)
AST SERPL W P-5'-P-CCNC: 30 U/L (ref 10–50)
BILIRUB SERPL-MCNC: 0.3 MG/DL
BUN SERPL-MCNC: 19.4 MG/DL (ref 8–23)
CALCIUM SERPL-MCNC: 9.3 MG/DL (ref 8.8–10.2)
CHLORIDE SERPL-SCNC: 104 MMOL/L (ref 98–107)
CHOLEST SERPL-MCNC: 167 MG/DL
CREAT SERPL-MCNC: 1.04 MG/DL (ref 0.67–1.17)
CREAT UR-MCNC: 118 MG/DL
DEPRECATED HCO3 PLAS-SCNC: 20 MMOL/L (ref 22–29)
GFR SERPL CREATININE-BSD FRML MDRD: 81 ML/MIN/1.73M2
GLUCOSE SERPL-MCNC: 91 MG/DL (ref 70–99)
HDLC SERPL-MCNC: 50 MG/DL
LDLC SERPL CALC-MCNC: 68 MG/DL
MICROALBUMIN UR-MCNC: <12 MG/L
MICROALBUMIN/CREAT UR: NORMAL MG/G{CREAT}
NONHDLC SERPL-MCNC: 117 MG/DL
POTASSIUM SERPL-SCNC: 4.4 MMOL/L (ref 3.4–5.3)
PROT SERPL-MCNC: 7.3 G/DL (ref 6.4–8.3)
PSA SERPL DL<=0.01 NG/ML-MCNC: 0.36 NG/ML (ref 0–4.5)
SODIUM SERPL-SCNC: 140 MMOL/L (ref 136–145)
TRIGL SERPL-MCNC: 247 MG/DL
TSH SERPL DL<=0.005 MIU/L-ACNC: 3.61 UIU/ML (ref 0.3–4.2)

## 2023-06-01 RX ORDER — DAPAGLIFLOZIN 10 MG/1
TABLET, FILM COATED ORAL
Qty: 90 TABLET | Refills: 0 | Status: SHIPPED | OUTPATIENT
Start: 2023-06-01 | End: 2023-09-08

## 2023-06-01 RX ORDER — HYDROCHLOROTHIAZIDE 25 MG/1
25 TABLET ORAL DAILY
Qty: 90 TABLET | Refills: 2 | Status: SHIPPED | OUTPATIENT
Start: 2023-06-01 | End: 2024-05-06

## 2023-06-01 NOTE — TELEPHONE ENCOUNTER
Routing refill request to provider for review/approval because:  Labs not current:    Hemoglobin A1C   Date Value Ref Range Status   05/31/2023 7.6 (H) 0.0 - 5.6 % Final   04/21/2021 10.2 (H) 0 - 5.6 % Final     Comment:     Results confirmed by repeat test  Normal <5.7% Prediabetes 5.7-6.4%  Diabetes 6.5% or higher - adopted from ADA   consensus guidelines.

## 2023-06-05 DIAGNOSIS — E11.9 TYPE 2 DIABETES MELLITUS WITHOUT COMPLICATION, WITHOUT LONG-TERM CURRENT USE OF INSULIN (H): ICD-10-CM

## 2023-06-06 DIAGNOSIS — I10 ESSENTIAL HYPERTENSION, BENIGN: ICD-10-CM

## 2023-06-07 RX ORDER — PIOGLITAZONEHYDROCHLORIDE 45 MG/1
TABLET ORAL
Qty: 90 TABLET | Refills: 1 | Status: SHIPPED | OUTPATIENT
Start: 2023-06-07 | End: 2024-05-06

## 2023-06-08 RX ORDER — LOSARTAN POTASSIUM 50 MG/1
TABLET ORAL
Qty: 180 TABLET | Refills: 11 | Status: SHIPPED | OUTPATIENT
Start: 2023-06-08 | End: 2024-08-02

## 2023-07-05 ENCOUNTER — TRANSFERRED RECORDS (OUTPATIENT)
Dept: HEALTH INFORMATION MANAGEMENT | Facility: CLINIC | Age: 63
End: 2023-07-05
Payer: COMMERCIAL

## 2023-07-05 LAB — RETINOPATHY: NEGATIVE

## 2023-07-10 DIAGNOSIS — E11.9 TYPE 2 DIABETES MELLITUS WITHOUT COMPLICATION, WITHOUT LONG-TERM CURRENT USE OF INSULIN (H): ICD-10-CM

## 2023-07-10 RX ORDER — GLIPIZIDE 10 MG/1
TABLET, FILM COATED, EXTENDED RELEASE ORAL
Qty: 180 TABLET | Refills: 1 | Status: SHIPPED | OUTPATIENT
Start: 2023-07-10 | End: 2024-03-18

## 2023-09-08 DIAGNOSIS — E11.9 TYPE 2 DIABETES MELLITUS WITHOUT COMPLICATION, WITHOUT LONG-TERM CURRENT USE OF INSULIN (H): ICD-10-CM

## 2023-09-08 RX ORDER — DAPAGLIFLOZIN 10 MG/1
TABLET, FILM COATED ORAL
Qty: 90 TABLET | Refills: 0 | Status: SHIPPED | OUTPATIENT
Start: 2023-09-08 | End: 2024-03-18

## 2023-09-26 DIAGNOSIS — E11.9 TYPE 2 DIABETES MELLITUS WITHOUT COMPLICATION, WITHOUT LONG-TERM CURRENT USE OF INSULIN (H): ICD-10-CM

## 2023-09-26 RX ORDER — DULAGLUTIDE 1.5 MG/.5ML
INJECTION, SOLUTION SUBCUTANEOUS
Qty: 2 ML | Refills: 0 | Status: SHIPPED | OUTPATIENT
Start: 2023-09-26 | End: 2023-10-23

## 2023-09-27 DIAGNOSIS — E11.9 TYPE 2 DIABETES MELLITUS WITHOUT COMPLICATION, WITHOUT LONG-TERM CURRENT USE OF INSULIN (H): ICD-10-CM

## 2023-09-27 RX ORDER — SIMVASTATIN 40 MG
TABLET ORAL
Qty: 90 TABLET | Refills: 0 | Status: SHIPPED | OUTPATIENT
Start: 2023-09-27 | End: 2023-12-28

## 2023-10-22 DIAGNOSIS — E11.9 TYPE 2 DIABETES MELLITUS WITHOUT COMPLICATION, WITHOUT LONG-TERM CURRENT USE OF INSULIN (H): ICD-10-CM

## 2023-10-23 RX ORDER — DULAGLUTIDE 1.5 MG/.5ML
INJECTION, SOLUTION SUBCUTANEOUS
Qty: 2 ML | Refills: 0 | Status: SHIPPED | OUTPATIENT
Start: 2023-10-23 | End: 2023-12-04

## 2023-12-02 ENCOUNTER — HEALTH MAINTENANCE LETTER (OUTPATIENT)
Age: 63
End: 2023-12-02

## 2023-12-04 DIAGNOSIS — E11.9 TYPE 2 DIABETES MELLITUS WITHOUT COMPLICATION, WITHOUT LONG-TERM CURRENT USE OF INSULIN (H): ICD-10-CM

## 2023-12-04 RX ORDER — DULAGLUTIDE 1.5 MG/.5ML
1.5 INJECTION, SOLUTION SUBCUTANEOUS
Qty: 2 ML | Refills: 0 | Status: SHIPPED | OUTPATIENT
Start: 2023-12-04 | End: 2024-02-19

## 2023-12-18 ENCOUNTER — MYC MEDICAL ADVICE (OUTPATIENT)
Dept: INTERNAL MEDICINE | Facility: CLINIC | Age: 63
End: 2023-12-18
Payer: COMMERCIAL

## 2023-12-20 ENCOUNTER — NURSE TRIAGE (OUTPATIENT)
Dept: INTERNAL MEDICINE | Facility: CLINIC | Age: 63
End: 2023-12-20
Payer: COMMERCIAL

## 2023-12-20 DIAGNOSIS — U07.1 INFECTION DUE TO 2019 NOVEL CORONAVIRUS: Primary | ICD-10-CM

## 2023-12-20 NOTE — TELEPHONE ENCOUNTER
Reason for Disposition    HIGH RISK patient (e.g., weak immune system, age > 64 years, obesity with BMI of 30 or higher, pregnant, chronic lung disease or other chronic medical condition) and COVID symptoms (e.g., cough, fever)  (Exceptions: Already seen by doctor or NP/PA and no new or worsening symptoms.)    Additional Information    Negative: SEVERE difficulty breathing (e.g., struggling for each breath, speaks in single words)    Negative: Difficult to awaken or acting confused (e.g., disoriented, slurred speech)    Negative: Bluish (or gray) lips or face now    Negative: Shock suspected (e.g., cold/pale/clammy skin, too weak to stand, low BP, rapid pulse)    Negative: Sounds like a life-threatening emergency to the triager    Negative: SEVERE or constant chest pain or pressure  (Exception: Mild central chest pain, present only when coughing.)    Negative: MODERATE difficulty breathing (e.g., speaks in phrases, SOB even at rest, pulse 100-120)    Negative: Headache and stiff neck (can't touch chin to chest)    Negative: Chest pain or pressure  (Exception: MILD central chest pain, present only when coughing.)    Negative: Drinking very little and dehydration suspected (e.g., no urine > 12 hours, very dry mouth, very lightheaded)    Negative: Patient sounds very sick or weak to the triager    Negative: MILD difficulty breathing (e.g., minimal/no SOB at rest, SOB with walking, pulse <100)    Negative: Fever > 103 F (39.4 C)    Negative: Fever > 101 F (38.3 C) and over 60 years of age    Negative: Fever > 100.0 F (37.8 C) and bedridden (e.g., CVA, chronic illness, recovering from surgery)    Protocols used: Coronavirus (COVID-19) Diagnosed or [id number]

## 2023-12-20 NOTE — CONFIDENTIAL NOTE
Called pt - no answer.  Left VM to call [phone] if still interested in Paxlovid.    Sully Briggs RN  [facility]

## 2023-12-20 NOTE — TELEPHONE ENCOUNTER
Patient returning call.  Please call him back.  He is interested in Paxlovid (see confidential note from Sully Briggs RN  in this encounter).    Please call him back.

## 2023-12-20 NOTE — CONFIDENTIAL NOTE
RN COVID TREATMENT VISIT  12/20/23    The patient has been triaged and does not require a higher level of care.    Garrick Espinosa  63 year old  Current weight? 260 lb    Has the patient been seen by a primary care provider at an [facility] within the past two years? Yes.   Have you been in close proximity to/do you have a known exposure to a person with a confirmed case of influenza? No.     General treatment eligibility:  Date of positive COVID test (PCR or at home)?  12/18/23    Are you or have you been hospitalized for this COVID-19 infection? No.   Have you received monoclonal antibodies or antiviral treatment for COVID-19 since this positive test? No.   Do you have any of the following conditions that place you at risk of being very sick from COVID-19?   - Age 50 years or older  Yes, patient has at least one high risk condition as noted above.     Current COVID symptoms:   - fever or chills  - cough  - muscle or body aches  - headache  - sore throat  Yes. Patient has at least one symptom as selected.     How many days since symptoms started? 5 days or less. Established patient, 12 years or older weighing at least 88.2 lbs, who has symptoms that started in the past 5 days, has not been hospitalized nor received treatment already, and is at risk for being very sick from COVID-19.     Treatment eligibility by RN:  Are you currently pregnant or nursing? No  Do you have a clinically significant hypersensitivity to nirmatrelvir or ritonavir, or toxic epidermal necrolysis (TEN) or Valladares-Jesús Syndrome? No  Do you have a history of hepatitis, any hepatic impairment on the Problem List (such as Child-Ortiz Class C, cirrhosis, fatty liver disease, alcoholic liver disease), or was the last liver lab (hepatic panel, ALT, AST, ALK Phos, bilirubin) elevated in the past 6 months? No  Do you have any history of severe renal impairment (eGFR < 30mL/min)? No    Is patient eligible  to continue? Yes, patient meets all eligibility requirements for the RN COVID treatment (as denoted by all no responses above).     Current Outpatient Medications   Medication Sig Dispense Refill    albuterol (PROAIR HFA/PROVENTIL HFA/VENTOLIN HFA) 108 (90 Base) MCG/ACT inhaler Inhale 2 puffs into the lungs every 6 hours 18 g 3    ASPIRIN PO Take 162 mg by mouth daily      blood glucose calibration (NO BRAND SPECIFIED) solution To accompany: Blood Glucose Monitor Brands: per insurance. 1 Bottle 3    blood glucose monitoring (ONE TOUCH ULTRA 2) meter device kit USE TO TEST BLOOD SUGAR 1-2 TIMES DAILY OR AS DIRECTED. 1 kit 0    doxylamine (UNISOM) 25 MG TABS tablet Take 25 mg by mouth At Bedtime      dulaglutide (TRULICITY) 1.5 MG/0.5ML pen Inject 1.5 mg Subcutaneous every 7 days 2 mL 0    FARXIGA 10 MG TABS tablet TAKE 1 TABLET (10 MG) BY MOUTH DAILY. 90 tablet 0    glipiZIDE (GLUCOTROL XL) 10 MG 24 hr tablet TAKE 1 TABLET BY MOUTH TWICE A  tablet 1    hydrochlorothiazide (HYDRODIURIL) 25 MG tablet Take 1 tablet (25 mg) by mouth daily 90 tablet 2    indomethacin (INDOCIN) 50 MG capsule Take 1 capsule (50 mg) by mouth 2 times daily (with meals) 30 capsule 0    losartan (COZAAR) 50 MG tablet TAKE 2 TABLETS BY MOUTH EVERY  tablet 11    MELATONIN PO Take by mouth nightly as needed      metFORMIN (GLUCOPHAGE XR) 500 MG 24 hr tablet Take 4 tablets (2,000 mg) by mouth daily (with dinner) 360 tablet 3    MULTI-DAY VITAMINS OR TABS 1 TABLET DAILY      ONETOUCH ULTRA test strip USE TO TEST BLOOD SUGAR 1-2 TIMES DAILY OR AS DIRECTED. 200 strip 3    pioglitazone (ACTOS) 45 MG tablet TAKE 1 TABLET BY MOUTH EVERY DAY 90 tablet 1    simvastatin (ZOCOR) 40 MG tablet TAKE 1 TABLET BY MOUTH EVERYDAY AT BEDTIME 90 tablet 0    Thiamine HCl (VITAMIN B-1 PO) Take by mouth daily Taking on summer time only      thin (NO BRAND SPECIFIED) lancets Use with lanceting device. To accompany: Blood Glucose Monitor Brands: per  insurance. 100 each 2    triamcinolone (KENALOG) 0.1 % external cream APPLY THIN LAYER TO RASH ON SKIN TWICE DAILY FOR NO MORE THAN 10-14 DAYS 80 g 3    TYLENOL EXTRA STRENGTH 500 MG PO TABS          Medications from List 1 of the standing order (on medications that exclude the use of Paxlovid) that patient is taking: NONE. Is patient taking Milton-Free[place]'s Wort? No  Is patient taking [place]-Freewater's Wort or any meds from List 1? No.   Medications from List 2 of the standing order (on meds that provider needs to adjust) that patient is taking: NONE. Is patient on any of the meds from List 2? No.   Medications from List 3 of standing order (on meds that a RN needs to adjust) that patient is taking: simvastatin (Zocor, FloLipid): Instructed patient to stop taking simvastatin while taking Paxlovid and first dose of Paxlovid must be at least 12 hours after last dose of simvastatin.  Instructed to restart simvastatin 5 days after the completion of Paxlovid.  Is patient on any meds from List 3? Yes. Patient is on meds from list 3. No meds require a provider visit and at least one med required RN to adjust.     Paxlovid has an approximate 90% reduction in hospitalization. Paxlovid can possibly cause altered sense of taste, diarrhea (loose, watery stools), high blood pressure, muscle aches.     Would patient like a Paxlovid prescription?   Yes.   Lab Results   Component Value Date    GFRESTIMATED 81 05/31/2023       Was last eGFR reduced? No, eGFR 60 or greater/ No Result on record. Patient can receive the normal renal function dose. Paxlovid Rx sent to [place] pharmacy   [facility]    Temporary change to home medications: See above.    All medication adjustments (holds, etc) were discussed with the patient and patient was asked to repeat back (teachback) their med adjustment.  Did patient understand med adjustment? Yes, patient repeated back and understood correctly.        Reviewed the following instructions with the patient:    Paxlovid  (nimatrelvir and ritonavir)    How it works  Two medicines (nirmatrelvir and ritonavir) are taken together. They stop the virus from growing. Less amount of virus is easier for your body to fight.    How to take  Medicine comes in a daily container with both medicine tablets. Take by mouth twice daily (once in the morning, once at night) for 5 days.  The number of tablets to take varies by patient.  Don't chew or break capsules. Swallow whole.    When to take  Take as soon as possible after positive COVID-19 test result, and within 5 days of your first symptoms.    Possible side effects  Can cause altered sense of taste, diarrhea (loose, watery stools), high blood pressure, muscle aches.    Sully Briggs RN

## 2023-12-24 DIAGNOSIS — E11.9 TYPE 2 DIABETES MELLITUS WITHOUT COMPLICATION, WITHOUT LONG-TERM CURRENT USE OF INSULIN (H): ICD-10-CM

## 2023-12-28 RX ORDER — SIMVASTATIN 40 MG
TABLET ORAL
Qty: 90 TABLET | Refills: 1 | Status: SHIPPED | OUTPATIENT
Start: 2023-12-28 | End: 2024-05-06

## 2024-02-19 DIAGNOSIS — E11.9 TYPE 2 DIABETES MELLITUS WITHOUT COMPLICATION, WITHOUT LONG-TERM CURRENT USE OF INSULIN (H): ICD-10-CM

## 2024-02-19 RX ORDER — DULAGLUTIDE 1.5 MG/.5ML
1.5 INJECTION, SOLUTION SUBCUTANEOUS
Qty: 2 ML | Refills: 0 | Status: SHIPPED | OUTPATIENT
Start: 2024-02-19 | End: 2024-05-06

## 2024-02-19 RX ORDER — METFORMIN HCL 500 MG
2000 TABLET, EXTENDED RELEASE 24 HR ORAL
Qty: 360 TABLET | Refills: 0 | Status: SHIPPED | OUTPATIENT
Start: 2024-02-19 | End: 2024-03-18

## 2024-03-16 DIAGNOSIS — E11.9 TYPE 2 DIABETES MELLITUS WITHOUT COMPLICATION, WITHOUT LONG-TERM CURRENT USE OF INSULIN (H): ICD-10-CM

## 2024-03-18 DIAGNOSIS — E11.9 TYPE 2 DIABETES MELLITUS WITHOUT COMPLICATION, WITHOUT LONG-TERM CURRENT USE OF INSULIN (H): ICD-10-CM

## 2024-03-18 RX ORDER — DAPAGLIFLOZIN 10 MG/1
10 TABLET, FILM COATED ORAL DAILY
Qty: 90 TABLET | Refills: 0 | Status: SHIPPED | OUTPATIENT
Start: 2024-03-18 | End: 2024-05-06

## 2024-03-18 RX ORDER — GLIPIZIDE 10 MG/1
TABLET, FILM COATED, EXTENDED RELEASE ORAL
Qty: 180 TABLET | Refills: 1 | Status: SHIPPED | OUTPATIENT
Start: 2024-03-18 | End: 2024-05-06

## 2024-03-18 RX ORDER — METFORMIN HCL 500 MG
2000 TABLET, EXTENDED RELEASE 24 HR ORAL
Qty: 360 TABLET | Refills: 0 | Status: SHIPPED | OUTPATIENT
Start: 2024-03-18 | End: 2024-05-06

## 2024-04-19 DIAGNOSIS — E11.9 TYPE 2 DIABETES MELLITUS WITHOUT COMPLICATION, WITHOUT LONG-TERM CURRENT USE OF INSULIN (H): ICD-10-CM

## 2024-04-22 RX ORDER — SIMVASTATIN 40 MG
TABLET ORAL
Qty: 90 TABLET | Refills: 1 | OUTPATIENT
Start: 2024-04-22

## 2024-05-06 ENCOUNTER — OFFICE VISIT (OUTPATIENT)
Dept: INTERNAL MEDICINE | Facility: CLINIC | Age: 64
End: 2024-05-06
Attending: INTERNAL MEDICINE
Payer: COMMERCIAL

## 2024-05-06 VITALS
OXYGEN SATURATION: 95 % | RESPIRATION RATE: 16 BRPM | TEMPERATURE: 98 F | HEIGHT: 68 IN | SYSTOLIC BLOOD PRESSURE: 127 MMHG | WEIGHT: 260.6 LBS | BODY MASS INDEX: 39.5 KG/M2 | DIASTOLIC BLOOD PRESSURE: 81 MMHG | HEART RATE: 83 BPM

## 2024-05-06 DIAGNOSIS — I10 ESSENTIAL HYPERTENSION, BENIGN: ICD-10-CM

## 2024-05-06 DIAGNOSIS — Z00.00 ENCOUNTER FOR PREVENTATIVE ADULT HEALTH CARE EXAMINATION: Primary | ICD-10-CM

## 2024-05-06 DIAGNOSIS — Z12.5 SCREENING FOR PROSTATE CANCER: ICD-10-CM

## 2024-05-06 DIAGNOSIS — R42 DIZZINESS: ICD-10-CM

## 2024-05-06 DIAGNOSIS — E11.9 TYPE 2 DIABETES MELLITUS WITHOUT COMPLICATION, WITHOUT LONG-TERM CURRENT USE OF INSULIN (H): ICD-10-CM

## 2024-05-06 DIAGNOSIS — Z12.11 SCREEN FOR COLON CANCER: ICD-10-CM

## 2024-05-06 PROCEDURE — 99396 PREV VISIT EST AGE 40-64: CPT | Performed by: INTERNAL MEDICINE

## 2024-05-06 PROCEDURE — 93000 ELECTROCARDIOGRAM COMPLETE: CPT | Performed by: INTERNAL MEDICINE

## 2024-05-06 PROCEDURE — 99213 OFFICE O/P EST LOW 20 MIN: CPT | Mod: 25 | Performed by: INTERNAL MEDICINE

## 2024-05-06 RX ORDER — HYDROCHLOROTHIAZIDE 25 MG/1
25 TABLET ORAL DAILY
Qty: 90 TABLET | Refills: 3 | Status: SHIPPED | OUTPATIENT
Start: 2024-05-06

## 2024-05-06 RX ORDER — PIOGLITAZONEHYDROCHLORIDE 45 MG/1
45 TABLET ORAL DAILY
Qty: 90 TABLET | Refills: 3 | Status: SHIPPED | OUTPATIENT
Start: 2024-05-06

## 2024-05-06 RX ORDER — DAPAGLIFLOZIN 10 MG/1
10 TABLET, FILM COATED ORAL DAILY
Qty: 90 TABLET | Refills: 3 | Status: SHIPPED | OUTPATIENT
Start: 2024-05-06

## 2024-05-06 RX ORDER — DULAGLUTIDE 1.5 MG/.5ML
1.5 INJECTION, SOLUTION SUBCUTANEOUS
Qty: 2 ML | Refills: 3 | Status: SHIPPED | OUTPATIENT
Start: 2024-05-06 | End: 2024-10-03

## 2024-05-06 RX ORDER — GLIPIZIDE 10 MG/1
10 TABLET, FILM COATED, EXTENDED RELEASE ORAL 2 TIMES DAILY
Qty: 180 TABLET | Refills: 3 | Status: SHIPPED | OUTPATIENT
Start: 2024-05-06

## 2024-05-06 RX ORDER — METFORMIN HCL 500 MG
2000 TABLET, EXTENDED RELEASE 24 HR ORAL
Qty: 360 TABLET | Refills: 3 | Status: SHIPPED | OUTPATIENT
Start: 2024-05-06

## 2024-05-06 RX ORDER — SIMVASTATIN 40 MG
TABLET ORAL
Qty: 90 TABLET | Refills: 3 | Status: SHIPPED | OUTPATIENT
Start: 2024-05-06

## 2024-05-06 ASSESSMENT — ASTHMA QUESTIONNAIRES
QUESTION_2 LAST FOUR WEEKS HOW OFTEN HAVE YOU HAD SHORTNESS OF BREATH: ONCE OR TWICE A WEEK
ACT_TOTALSCORE: 21
ACT_TOTALSCORE: 21
QUESTION_3 LAST FOUR WEEKS HOW OFTEN DID YOUR ASTHMA SYMPTOMS (WHEEZING, COUGHING, SHORTNESS OF BREATH, CHEST TIGHTNESS OR PAIN) WAKE YOU UP AT NIGHT OR EARLIER THAN USUAL IN THE MORNING: ONCE OR TWICE
QUESTION_1 LAST FOUR WEEKS HOW MUCH OF THE TIME DID YOUR ASTHMA KEEP YOU FROM GETTING AS MUCH DONE AT WORK, SCHOOL OR AT HOME: A LITTLE OF THE TIME
QUESTION_5 LAST FOUR WEEKS HOW WOULD YOU RATE YOUR ASTHMA CONTROL: WELL CONTROLLED
QUESTION_4 LAST FOUR WEEKS HOW OFTEN HAVE YOU USED YOUR RESCUE INHALER OR NEBULIZER MEDICATION (SUCH AS ALBUTEROL): NOT AT ALL

## 2024-05-06 ASSESSMENT — PAIN SCALES - GENERAL: PAINLEVEL: NO PAIN (0)

## 2024-05-06 NOTE — LETTER
May 8, 2024      Garrick Espinosa  [address]  [facility] [address]        Dear ,    We are writing to inform you of your test results.    Elevated triglycerides and slightly worsened diabetic control.   Try to improve diet and exercise. Recheck in 6 months.     Resulted Orders   HEMOGLOBIN A1C   Result Value Ref Range    Hemoglobin A1C 8.4 (H) 0.0 - 5.6 %      Comment:      Normal <5.7%   Prediabetes 5.7-6.4%    Diabetes 6.5% or higher     Note: Adopted from ADA consensus guidelines.    Narrative    Results consistent with previous, repeat testing unnecessary     Lipid panel reflex to direct LDL Fasting   Result Value Ref Range    Cholesterol 176 <200 mg/dL    Triglycerides 314 (H) <150 mg/dL    Direct Measure HDL 49 >=40 mg/dL    LDL Cholesterol Calculated 64 <=100 mg/dL    Non HDL Cholesterol 127 <130 mg/dL    Patient Fasting > 8hrs? Yes     Narrative    Cholesterol  Desirable:  <200 mg/dL    Triglycerides  Normal:  Less than 150 mg/dL  Borderline High:  150-199 mg/dL  High:  200-499 mg/dL  Very High:  Greater than or equal to 500 mg/dL    Direct Measure HDL  Female:  Greater than or equal to 50 mg/dL   Male:  Greater than or equal to 40 mg/dL    LDL Cholesterol  Desirable:  <100mg/dL  Above Desirable:  100-129 mg/dL   Borderline High:  130-159 mg/dL   High:  160-189 mg/dL   Very High:  >= 190 mg/dL    Non HDL Cholesterol  Desirable:  130 mg/dL  Above Desirable:  130-159 mg/dL  Borderline High:  160-189 mg/dL  High:  190-219 mg/dL  Very High:  Greater than or equal to 220 mg/dL   CBC with platelets   Result Value Ref Range    WBC Count 7.8 4.0 - 11.0 10e3/uL    RBC Count 4.95 4.40 - 5.90 10e6/uL    Hemoglobin 14.2 13.3 - 17.7 g/dL    Hematocrit 42.5 40.0 - 53.0 %    MCV 86 78 - 100 fL    MCH 28.7 26.5 - 33.0 pg    MCHC 33.4 31.5 - 36.5 g/dL    RDW 14.5 10.0 - 15.0 %    Platelet Count 269 150 - 450 10e3/uL   Comprehensive metabolic panel (BMP + Alb, Alk Phos, ALT, AST, Total. Bili, TP)   Result  Value Ref Range    Sodium 136 135 - 145 mmol/L      Comment:      Reference intervals for this test were updated on 09/26/2023 to more accurately reflect our healthy population. There may be differences in the flagging of prior results with similar values performed with this method. Interpretation of those prior results can be made in the context of the updated reference intervals.     Potassium 4.4 3.4 - 5.3 mmol/L    Carbon Dioxide (CO2) 25 22 - 29 mmol/L    Anion Gap 12 7 - 15 mmol/L    Urea Nitrogen 26.5 (H) 8.0 - 23.0 mg/dL    Creatinine 1.27 (H) 0.67 - 1.17 mg/dL    GFR Estimate 63 >60 mL/min/1.73m2    Calcium 9.6 8.8 - 10.2 mg/dL    Chloride 99 98 - 107 mmol/L    Glucose 148 (H) 70 - 99 mg/dL    Alkaline Phosphatase 59 40 - 150 U/L      Comment:      Reference intervals for this test were updated on 11/14/2023 to more accurately reflect our healthy population. There may be differences in the flagging of prior results with similar values performed with this method. Interpretation of those prior results can be made in the context of the updated reference intervals.    AST 28 0 - 45 U/L      Comment:      Reference intervals for this test were updated on 6/12/2023 to more accurately reflect our healthy population. There may be differences in the flagging of prior results with similar values performed with this method. Interpretation of those prior results can be made in the context of the updated reference intervals.    ALT 13 0 - 70 U/L      Comment:      Reference intervals for this test were updated on 6/12/2023 to more accurately reflect our healthy population. There may be differences in the flagging of prior results with similar values performed with this method. Interpretation of those prior results can be made in the context of the updated reference intervals.      Protein Total 7.2 6.4 - 8.3 g/dL    Albumin 4.3 3.5 - 5.2 g/dL    Bilirubin Total 0.2 <=1.2 mg/dL   TSH with free T4 reflex   Result Value  Ref Range    TSH 6.74 (H) 0.30 - 4.20 uIU/mL   PSA, screen   Result Value Ref Range    Prostate Specific Antigen Screen 0.37 0.00 - 4.50 ng/mL    Narrative    This result is obtained using the Roche Elecsys total PSA method on the geronimo [id number] immunoassay analyzer. Results obtained with different assay methods or kits cannot be used interchangeably.   UA with Microscopic reflex to Culture - lab collect   Result Value Ref Range    Color Urine Yellow Colorless, Straw, Light Yellow, Yellow    Appearance Urine Clear Clear    Glucose Urine >=1000 (A) Negative mg/dL    Bilirubin Urine Negative Negative    Ketones Urine Negative Negative mg/dL    Specific Gravity Urine 1.025 1.003 - 1.035    Blood Urine Negative Negative    pH Urine 5.5 5.0 - 7.0    Protein Albumin Urine Negative Negative mg/dL    Urobilinogen Urine 0.2 0.2, 1.0 E.U./dL    Nitrite Urine Negative Negative    Leukocyte Esterase Urine Negative Negative   UA Microscopic with Reflex to Culture   Result Value Ref Range    Bacteria Urine Few (A) None Seen /HPF    RBC Urine None Seen 0-2 /HPF /HPF    WBC Urine 0-5 0-5 /HPF /HPF    Squamous Epithelials Urine Few (A) None Seen /LPF    Mucus Urine Present (A) None Seen /LPF    Hyaline Casts Urine 0-2 (A) None Seen /LPF    Narrative    Urine Culture not indicated   T4 free   Result Value Ref Range    Free T4 1.42 0.90 - 1.70 ng/dL       If you have any questions or concerns, please call the clinic at the number listed above.       Sincerely,      Ibrahima Smith MD

## 2024-05-06 NOTE — PROGRESS NOTES
"  Assessment & Plan     Type 2 diabetes mellitus without complication, without long-term current use of insulin (H)  Assess diabetic control - HEMOGLOBIN A1C  - Lipid panel reflex to direct LDL Fasting  - OFFICE/OUTPT VISIT,EST,LEVL III    Screen for colon cancer    - Colonoscopy Screening  Referral; Future    Essential hypertension, benign  Controlled, continue treatment   - CBC with platelets  - Comprehensive metabolic panel (BMP + Alb, Alk Phos, ALT, AST, Total. Bili, TP)  - TSH with free T4 reflex  - UA with Microscopic reflex to Culture - lab collect  - Albumin Random Urine Quantitative with Creat Ratio  - EKG 12-lead complete w/read - Clinics  - OFFICE/OUTPT VISIT,EST,LEVL III    Encounter for preventative adult health care examination  advised regular aerobic activity, low cholesterol, low salt diet, wearing seat belt,  self examinations, sunscreen protection.Obtain screening cholesterol, immunizations reviewed.  - CBC with platelets  - Comprehensive metabolic panel (BMP + Alb, Alk Phos, ALT, AST, Total. Bili, TP)  - TSH with free T4 reflex  - PSA, screen  - UA with Microscopic reflex to Culture - lab collect  - Albumin Random Urine Quantitative with Creat Ratio  - EKG 12-lead complete w/read - Clinics    Dizziness  Assess EKG, monitor BP, consider heart monitor , assess lab   - EKG 12-lead complete w/read - Clinics  - OFFICE/OUTPT VISIT,EST,LEVL III    Screening for prostate cancer    - PSA, screen            BMI  Estimated body mass index is 39.62 kg/m  as calculated from the following:    Height as of this encounter: 1.727 m (5' 8\").    Weight as of this encounter: 118.2 kg (260 lb 9.6 oz).   Weight management plan: Discussed healthy diet and exercise guidelines      See Patient Instructions    Tracey Mccauley is a 63 year old, presenting for the following health issues:  Dizziness (X3 months; happens about 1-2 per week; not sure if it's a breathing issue or diabetes issue)        5/6/2024     " 9:23 AM   Additional Questions   Roomed by Dionne GAINES   Accompanied by n/a     History of Present Illness       Diabetes:   He presents for follow up of diabetes.  He is checking home blood glucose one time daily.   He checks blood glucose before meals.  Blood glucose is never over 200 and never under 70.  When his blood glucose is low, the patient is asymptomatic for confusion, blurred vision, lethargy and reports not feeling dizzy, shaky, or weak.   He has no concerns regarding his diabetes at this time.  He is having weight gain.                    Annual Wellness Visit     Patient has been advised of split billing requirements and indicates understanding: Yes        In general, how would you rate your overall physical health? excellent  Do you have a special diet?  Diabetic         No data to display              Do you see a dentist two times every year?  Yes  Have you been more tired than usual lately?  No  If you drink alcohol do you typically have >3 drinks per day or >7 drinks per week? No  Do you have a current opioid prescription? No  Do you use any other controlled substances or medications that are not prescribed by a provider? None  Social History     Tobacco Use    Smoking status: Never    Smokeless tobacco: Never   Vaping Use    Vaping status: Never Used   Substance Use Topics    Alcohol use: Yes     Comment: Occ    Drug use: No       Needs assistance for the following daily activities: no assistance needed  Which of the following safety concerns are present in your home?  none identified   Do you (or your family members) have any concerns about your safety while driving?  No  Do you have any of the following hearing concerns?: No hearing concerns  In the past 6 months, have you been bothered by leaking of urine? No            No data to display                   Today's PHQ-2 Score:       5/6/2024     9:18 AM   PHQ-2 ( 1999 [employer])   Q1: Little interest or pleasure in doing things 0   Q2: Feeling  down, depressed or hopeless 0   PHQ-2 Score 0   Q1: Little interest or pleasure in doing things Not at all   Q2: Feeling down, depressed or hopeless Not at all   PHQ-2 Score 0       Last PSA:   PSA   Date Value Ref Range Status   04/21/2021 0.33 0 - 4 ug/L Final     Comment:     Assay Method:  Chemiluminescence using Siemens Vista analyzer     Prostate Specific Antigen Screen   Date Value Ref Range Status   05/31/2023 0.36 0.00 - 4.50 ng/mL Final   05/11/2022 0.40 0.00 - 4.00 ug/L Final     ASCVD Risk   The 10-year ASCVD risk score (Brii LEWIS, et al., 2019) is: 20%    Values used to calculate the score:      Age: 63 years      Sex: Male      Is Non- : No      Diabetic: Yes      Tobacco smoker: No      Systolic Blood Pressure: 127 mmHg      Is BP treated: Yes      HDL Cholesterol: 50 mg/dL      Total Cholesterol: 167 mg/dL           Reviewed and updated as needed this visit by Provider                    Lab work is in process  Labs reviewed in EPIC    Current providers sharing in care for this patient include:  Patient Care Team:  Ibrahima Smith MD as PCP - General (Internal Medicine)  Ibrahima Smith MD as Assigned PCP  Marlyn Foley RD as Diabetes Educator (Dietitian, Registered)    The following health maintenance items are reviewed in Epic and correct as of today:  Health Maintenance   Topic Date Due    ASTHMA ACTION PLAN  Never done    Pneumococcal Vaccine: Pediatrics (0 to 5 Years) and At-Risk Patients (6 to 64 Years) (1 of 2 - PCV) Never done    IPV IMMUNIZATION (2 of 3 - Adult catch-up series) 02/28/2006    ZOSTER IMMUNIZATION (1 of 2) Never done    DIABETIC FOOT EXAM  02/19/2019    RSV VACCINE (Pregnancy & 60+) (1 - 1-dose 60+ series) Never done    COVID-19 Vaccine (3 - 2023-24 season) 09/01/2023    A1C  11/30/2023    COLORECTAL CANCER SCREENING  03/29/2024    BMP  05/31/2024    LIPID  05/31/2024    MICROALBUMIN  05/31/2024    EYE EXAM  07/05/2024    ASTHMA  "CONTROL TEST  11/06/2024    YEARLY PREVENTIVE VISIT  05/06/2025    ANNUAL REVIEW OF HM ORDERS  05/06/2025    ADVANCE CARE PLANNING  04/21/2026    DTAP/TDAP/TD IMMUNIZATION (2 - Td or Tdap) 07/24/2028    HEPATITIS C SCREENING  Completed    HIV SCREENING  Completed    PHQ-2 (once per calendar year)  Completed    INFLUENZA VACCINE  Completed    HPV IMMUNIZATION  Aged Out    MENINGITIS IMMUNIZATION  Aged Out    RSV MONOCLONAL ANTIBODY  Aged Out       Appropriate preventive services were discussed with this patient, including applicable screening as appropriate for fall prevention, nutrition, physical activity, Tobacco-use cessation, weight loss and cognition.  Checklist reviewing preventive services available has been given to the patient.    Has H/O DM. On diet , exercise and oral treatment , unable to find Trulicity in the pharmacy. Blood sugars are controlled. No parestesias. No hypoglycemias.  Has h/o HTN. on medical treatment. BP has been controlled. No side effects from medications. No CP, HA, dizziness. good compliance with medications and low salt diet.  Concern for feeling dizzy, for the past 2 months, on and off. Symptoms last for seconds, feeling of lightheadedness, no spinning sensation. Able to stop it by taking a deep breath.   No symptoms related to exercise. No chest pain, heart palpitations. No SOB.         Review of Systems  Constitutional, HEENT, cardiovascular, pulmonary, GI, , musculoskeletal, neuro, skin, endocrine and psych systems are negative, except as otherwise noted.      Objective    /81 (BP Location: Left arm, Cuff Size: Adult Large)   Pulse 83   Temp 98  F (36.7  C) (Tympanic)   Resp 16   Ht 1.727 m (5' 8\")   Wt 118.2 kg (260 lb 9.6 oz)   SpO2 95%   BMI 39.62 kg/m    Body mass index is 39.62 kg/m .  Physical Exam   GENERAL: alert and no distress, obese  EYES: Eyes grossly normal to inspection, PERRL and conjunctivae and sclerae normal  HENT: ear canals and TM's normal, nose " and mouth without ulcers or lesions  NECK: no adenopathy, no asymmetry, masses, or scars  RESP: lungs clear to auscultation - no rales, rhonchi or wheezes  CV: regular rate and rhythm, normal S1 S2, no S3 or S4, no murmur, click or rub, no peripheral edema  ABDOMEN: soft, nontender, no hepatosplenomegaly, no masses and bowel sounds normal  MS: no gross musculoskeletal defects noted, no edema  SKIN: no suspicious lesions or rashes  NEURO: Normal strength and tone, mentation intact and speech normal  PSYCH: mentation appears normal, affect normal/bright    Transferred Records on 07/05/2023   Component Date Value Ref Range Status    RETINOPATHY 07/05/2023 NEGATIVE   Final           Signed Electronically by: Ibrahima Smith MD

## 2024-05-07 ENCOUNTER — LAB (OUTPATIENT)
Dept: LAB | Facility: CLINIC | Age: 64
End: 2024-05-07
Payer: COMMERCIAL

## 2024-05-07 ENCOUNTER — TELEPHONE (OUTPATIENT)
Dept: INTERNAL MEDICINE | Facility: CLINIC | Age: 64
End: 2024-05-07

## 2024-05-07 DIAGNOSIS — I10 ESSENTIAL HYPERTENSION, BENIGN: Primary | ICD-10-CM

## 2024-05-07 DIAGNOSIS — Z00.00 ROUTINE GENERAL MEDICAL EXAMINATION AT A HEALTH CARE FACILITY: ICD-10-CM

## 2024-05-07 LAB
ALBUMIN SERPL BCG-MCNC: 4.3 G/DL (ref 3.5–5.2)
ALBUMIN UR-MCNC: NEGATIVE MG/DL
ALP SERPL-CCNC: 59 U/L (ref 40–150)
ALT SERPL W P-5'-P-CCNC: 13 U/L (ref 0–70)
ANION GAP SERPL CALCULATED.3IONS-SCNC: 12 MMOL/L (ref 7–15)
APPEARANCE UR: CLEAR
AST SERPL W P-5'-P-CCNC: 28 U/L (ref 0–45)
BACTERIA #/AREA URNS HPF: ABNORMAL /HPF
BILIRUB SERPL-MCNC: 0.2 MG/DL
BILIRUB UR QL STRIP: NEGATIVE
BUN SERPL-MCNC: 26.5 MG/DL (ref 8–23)
CALCIUM SERPL-MCNC: 9.6 MG/DL (ref 8.8–10.2)
CHLORIDE SERPL-SCNC: 99 MMOL/L (ref 98–107)
CHOLEST SERPL-MCNC: 176 MG/DL
COLOR UR AUTO: YELLOW
CREAT SERPL-MCNC: 1.27 MG/DL (ref 0.67–1.17)
CREAT UR-MCNC: 114 MG/DL
DEPRECATED HCO3 PLAS-SCNC: 25 MMOL/L (ref 22–29)
EGFRCR SERPLBLD CKD-EPI 2021: 63 ML/MIN/1.73M2
ERYTHROCYTE [DISTWIDTH] IN BLOOD BY AUTOMATED COUNT: 14.5 % (ref 10–15)
FASTING STATUS PATIENT QL REPORTED: YES
GLUCOSE SERPL-MCNC: 148 MG/DL (ref 70–99)
GLUCOSE UR STRIP-MCNC: >=1000 MG/DL
HBA1C MFR BLD: 8.4 % (ref 0–5.6)
HCT VFR BLD AUTO: 42.5 % (ref 40–53)
HDLC SERPL-MCNC: 49 MG/DL
HGB BLD-MCNC: 14.2 G/DL (ref 13.3–17.7)
HGB UR QL STRIP: NEGATIVE
HYALINE CASTS #/AREA URNS LPF: ABNORMAL /LPF
KETONES UR STRIP-MCNC: NEGATIVE MG/DL
LDLC SERPL CALC-MCNC: 64 MG/DL
LEUKOCYTE ESTERASE UR QL STRIP: NEGATIVE
MCH RBC QN AUTO: 28.7 PG (ref 26.5–33)
MCHC RBC AUTO-ENTMCNC: 33.4 G/DL (ref 31.5–36.5)
MCV RBC AUTO: 86 FL (ref 78–100)
MICROALBUMIN UR-MCNC: <12 MG/L
MICROALBUMIN/CREAT UR: NORMAL MG/G{CREAT}
MUCOUS THREADS #/AREA URNS LPF: PRESENT /LPF
NITRATE UR QL: NEGATIVE
NONHDLC SERPL-MCNC: 127 MG/DL
PH UR STRIP: 5.5 [PH] (ref 5–7)
PLATELET # BLD AUTO: 269 10E3/UL (ref 150–450)
POTASSIUM SERPL-SCNC: 4.4 MMOL/L (ref 3.4–5.3)
PROT SERPL-MCNC: 7.2 G/DL (ref 6.4–8.3)
PSA SERPL DL<=0.01 NG/ML-MCNC: 0.37 NG/ML (ref 0–4.5)
RBC # BLD AUTO: 4.95 10E6/UL (ref 4.4–5.9)
RBC #/AREA URNS AUTO: ABNORMAL /HPF
SODIUM SERPL-SCNC: 136 MMOL/L (ref 135–145)
SP GR UR STRIP: 1.02 (ref 1–1.03)
SQUAMOUS #/AREA URNS AUTO: ABNORMAL /LPF
T4 FREE SERPL-MCNC: 1.42 NG/DL (ref 0.9–1.7)
TRIGL SERPL-MCNC: 314 MG/DL
TSH SERPL DL<=0.005 MIU/L-ACNC: 6.74 UIU/ML (ref 0.3–4.2)
UROBILINOGEN UR STRIP-ACNC: 0.2 E.U./DL
WBC # BLD AUTO: 7.8 10E3/UL (ref 4–11)
WBC #/AREA URNS AUTO: ABNORMAL /HPF

## 2024-05-07 PROCEDURE — 84439 ASSAY OF FREE THYROXINE: CPT | Performed by: INTERNAL MEDICINE

## 2024-05-07 PROCEDURE — 85027 COMPLETE CBC AUTOMATED: CPT | Performed by: INTERNAL MEDICINE

## 2024-05-07 PROCEDURE — G0103 PSA SCREENING: HCPCS | Performed by: INTERNAL MEDICINE

## 2024-05-07 PROCEDURE — 82570 ASSAY OF URINE CREATININE: CPT | Performed by: INTERNAL MEDICINE

## 2024-05-07 PROCEDURE — 81001 URINALYSIS AUTO W/SCOPE: CPT | Performed by: INTERNAL MEDICINE

## 2024-05-07 PROCEDURE — 83036 HEMOGLOBIN GLYCOSYLATED A1C: CPT | Performed by: INTERNAL MEDICINE

## 2024-05-07 PROCEDURE — 36415 COLL VENOUS BLD VENIPUNCTURE: CPT | Performed by: INTERNAL MEDICINE

## 2024-05-07 PROCEDURE — 84443 ASSAY THYROID STIM HORMONE: CPT | Performed by: INTERNAL MEDICINE

## 2024-05-07 PROCEDURE — 80061 LIPID PANEL: CPT | Performed by: INTERNAL MEDICINE

## 2024-05-07 PROCEDURE — 82043 UR ALBUMIN QUANTITATIVE: CPT | Performed by: INTERNAL MEDICINE

## 2024-05-07 PROCEDURE — 80053 COMPREHEN METABOLIC PANEL: CPT | Performed by: INTERNAL MEDICINE

## 2024-05-07 NOTE — LETTER
May 8, 2024      Garrick Espinosa  [address]  [facility] [address]        Dear ,    We are writing to inform you of your test results.    Your results are within normal limits.    Resulted Orders   Albumin Random Urine Quantitative with Creat Ratio   Result Value Ref Range    Creatinine Urine mg/dL 114.0 mg/dL      Comment:      The reference ranges have not been established in urine creatinine. The results should be integrated into the clinical context for interpretation.    Albumin Urine mg/L <12.0 mg/L      Comment:      The reference ranges have not been established in urine albumin. The results should be integrated into the clinical context for interpretation.    Albumin Urine mg/g Cr        Comment:      Unable to calculate, urine albumin and/or urine creatinine is outside detectable limits.  Microalbuminuria is defined as an albumin:creatinine ratio of 17 to 299 for males and 25 to 299 for females. A ratio of albumin:creatinine of 300 or higher is indicative of overt proteinuria.  Due to biologic variability, positive results should be confirmed by a second, first-morning random or 24-hour timed urine specimen. If there is discrepancy, a third specimen is recommended. When 2 out of 3 results are in the microalbuminuria range, this is evidence for incipient nephropathy and warrants increased efforts at glucose control, blood pressure control, and institution of therapy with an angiotensin-converting-enzyme (ACE) inhibitor (if the patient can tolerate it).         If you have any questions or concerns, please call the clinic at the number listed above.       Sincerely,      Ibrahima Smith MD

## 2024-07-11 ENCOUNTER — TRANSFERRED RECORDS (OUTPATIENT)
Dept: HEALTH INFORMATION MANAGEMENT | Facility: CLINIC | Age: 64
End: 2024-07-11
Payer: COMMERCIAL

## 2024-08-02 DIAGNOSIS — I10 ESSENTIAL HYPERTENSION, BENIGN: ICD-10-CM

## 2024-08-02 RX ORDER — LOSARTAN POTASSIUM 50 MG/1
TABLET ORAL
Qty: 180 TABLET | Refills: 2 | Status: SHIPPED | OUTPATIENT
Start: 2024-08-02

## 2024-10-02 DIAGNOSIS — E11.9 TYPE 2 DIABETES MELLITUS WITHOUT COMPLICATION, WITHOUT LONG-TERM CURRENT USE OF INSULIN (H): ICD-10-CM

## 2024-11-10 ENCOUNTER — HEALTH MAINTENANCE LETTER (OUTPATIENT)
Age: 64
End: 2024-11-10

## 2025-03-17 DIAGNOSIS — I10 ESSENTIAL HYPERTENSION, BENIGN: ICD-10-CM

## 2025-03-17 RX ORDER — LOSARTAN POTASSIUM 50 MG/1
TABLET ORAL
Qty: 180 TABLET | Refills: 2 | OUTPATIENT
Start: 2025-03-17

## 2025-05-05 DIAGNOSIS — E11.9 TYPE 2 DIABETES MELLITUS WITHOUT COMPLICATION, WITHOUT LONG-TERM CURRENT USE OF INSULIN (H): ICD-10-CM

## 2025-05-05 RX ORDER — DULAGLUTIDE 1.5 MG/.5ML
1.5 INJECTION, SOLUTION SUBCUTANEOUS
Qty: 2 ML | OUTPATIENT
Start: 2025-05-05

## 2025-05-05 SDOH — HEALTH STABILITY: PHYSICAL HEALTH: ON AVERAGE, HOW MANY DAYS PER WEEK DO YOU ENGAGE IN MODERATE TO STRENUOUS EXERCISE (LIKE A BRISK WALK)?: 2 DAYS

## 2025-05-05 SDOH — HEALTH STABILITY: PHYSICAL HEALTH: ON AVERAGE, HOW MANY MINUTES DO YOU ENGAGE IN EXERCISE AT THIS LEVEL?: 40 MIN

## 2025-05-05 ASSESSMENT — ASTHMA QUESTIONNAIRES
QUESTION_3 LAST FOUR WEEKS HOW OFTEN DID YOUR ASTHMA SYMPTOMS (WHEEZING, COUGHING, SHORTNESS OF BREATH, CHEST TIGHTNESS OR PAIN) WAKE YOU UP AT NIGHT OR EARLIER THAN USUAL IN THE MORNING: NOT AT ALL
QUESTION_2 LAST FOUR WEEKS HOW OFTEN HAVE YOU HAD SHORTNESS OF BREATH: NOT AT ALL
ACT_TOTALSCORE: 24
QUESTION_1 LAST FOUR WEEKS HOW MUCH OF THE TIME DID YOUR ASTHMA KEEP YOU FROM GETTING AS MUCH DONE AT WORK, SCHOOL OR AT HOME: NONE OF THE TIME
QUESTION_5 LAST FOUR WEEKS HOW WOULD YOU RATE YOUR ASTHMA CONTROL: WELL CONTROLLED
QUESTION_4 LAST FOUR WEEKS HOW OFTEN HAVE YOU USED YOUR RESCUE INHALER OR NEBULIZER MEDICATION (SUCH AS ALBUTEROL): NOT AT ALL

## 2025-05-05 ASSESSMENT — SOCIAL DETERMINANTS OF HEALTH (SDOH): HOW OFTEN DO YOU GET TOGETHER WITH FRIENDS OR RELATIVES?: ONCE A WEEK

## 2025-05-05 NOTE — TELEPHONE ENCOUNTER
Patient calls back. States medication was not available initially so he wasn't able to start it for a month or two. He does have appointment tomorrow with Dr. Smith and can discuss refill at appointment. Refill request denied.     Merle Marinelli RN  [facility] [place]

## 2025-05-05 NOTE — TELEPHONE ENCOUNTER
Clinic RN: Please investigate patient's chart or contact patient if the information cannot be found because patient should have run out of this medication on 2/1/25. Confirm patient is taking this medication as prescribed. Document findings and route refill encounter to provider for approval or denial.    Marlyn Rhodes, JOHNN, RN

## 2025-05-06 ENCOUNTER — OFFICE VISIT (OUTPATIENT)
Dept: INTERNAL MEDICINE | Facility: CLINIC | Age: 65
End: 2025-05-06
Attending: INTERNAL MEDICINE
Payer: COMMERCIAL

## 2025-05-06 VITALS
SYSTOLIC BLOOD PRESSURE: 125 MMHG | RESPIRATION RATE: 14 BRPM | HEART RATE: 85 BPM | DIASTOLIC BLOOD PRESSURE: 85 MMHG | HEIGHT: 67 IN | TEMPERATURE: 97.9 F | BODY MASS INDEX: 37.9 KG/M2 | OXYGEN SATURATION: 96 % | WEIGHT: 241.5 LBS

## 2025-05-06 DIAGNOSIS — Z12.5 SCREENING FOR PROSTATE CANCER: ICD-10-CM

## 2025-05-06 DIAGNOSIS — Z02.9 ADMINISTRATIVE ENCOUNTER: ICD-10-CM

## 2025-05-06 DIAGNOSIS — M67.449 DIGITAL MUCINOUS CYST: ICD-10-CM

## 2025-05-06 DIAGNOSIS — Z00.00 ENCOUNTER FOR PREVENTATIVE ADULT HEALTH CARE EXAMINATION: Primary | ICD-10-CM

## 2025-05-06 DIAGNOSIS — I10 ESSENTIAL HYPERTENSION, BENIGN: ICD-10-CM

## 2025-05-06 DIAGNOSIS — J45.20 MILD INTERMITTENT ASTHMA WITHOUT COMPLICATION: ICD-10-CM

## 2025-05-06 DIAGNOSIS — Z12.11 SCREEN FOR COLON CANCER: ICD-10-CM

## 2025-05-06 DIAGNOSIS — E66.01 MORBID OBESITY (H): ICD-10-CM

## 2025-05-06 DIAGNOSIS — N52.2 DRUG-INDUCED ERECTILE DYSFUNCTION: ICD-10-CM

## 2025-05-06 DIAGNOSIS — E11.9 TYPE 2 DIABETES MELLITUS WITHOUT COMPLICATION, WITHOUT LONG-TERM CURRENT USE OF INSULIN (H): ICD-10-CM

## 2025-05-06 DIAGNOSIS — Z13.6 SCREENING FOR CARDIOVASCULAR CONDITION: ICD-10-CM

## 2025-05-06 LAB
ALBUMIN SERPL BCG-MCNC: 4.4 G/DL (ref 3.5–5.2)
ALP SERPL-CCNC: 67 U/L (ref 40–150)
ALT SERPL W P-5'-P-CCNC: 20 U/L (ref 0–70)
ANION GAP SERPL CALCULATED.3IONS-SCNC: 14 MMOL/L (ref 7–15)
AST SERPL W P-5'-P-CCNC: 41 U/L (ref 0–45)
BILIRUB SERPL-MCNC: 0.3 MG/DL
BUN SERPL-MCNC: 21.5 MG/DL (ref 8–23)
CALCIUM SERPL-MCNC: 9.5 MG/DL (ref 8.8–10.4)
CHLORIDE SERPL-SCNC: 98 MMOL/L (ref 98–107)
CHOLEST SERPL-MCNC: 184 MG/DL
CREAT SERPL-MCNC: 1.33 MG/DL (ref 0.67–1.17)
CREAT UR-MCNC: 86.7 MG/DL
EGFRCR SERPLBLD CKD-EPI 2021: 60 ML/MIN/1.73M2
ERYTHROCYTE [DISTWIDTH] IN BLOOD BY AUTOMATED COUNT: 14.5 % (ref 10–15)
EST. AVERAGE GLUCOSE BLD GHB EST-MCNC: 189 MG/DL
FASTING STATUS PATIENT QL REPORTED: YES
FASTING STATUS PATIENT QL REPORTED: YES
GLUCOSE SERPL-MCNC: 147 MG/DL (ref 70–99)
HBA1C MFR BLD: 8.2 % (ref 0–5.6)
HCO3 SERPL-SCNC: 24 MMOL/L (ref 22–29)
HCT VFR BLD AUTO: 42.5 % (ref 40–53)
HDLC SERPL-MCNC: 47 MG/DL
HGB BLD-MCNC: 14.7 G/DL (ref 13.3–17.7)
LDLC SERPL CALC-MCNC: 73 MG/DL
MCH RBC QN AUTO: 29.4 PG (ref 26.5–33)
MCHC RBC AUTO-ENTMCNC: 34.6 G/DL (ref 31.5–36.5)
MCV RBC AUTO: 85 FL (ref 78–100)
MICROALBUMIN UR-MCNC: <12 MG/L
MICROALBUMIN/CREAT UR: NORMAL MG/G{CREAT}
NONHDLC SERPL-MCNC: 137 MG/DL
PLATELET # BLD AUTO: 276 10E3/UL (ref 150–450)
POTASSIUM SERPL-SCNC: 4.7 MMOL/L (ref 3.4–5.3)
PROT SERPL-MCNC: 7.4 G/DL (ref 6.4–8.3)
PSA SERPL DL<=0.01 NG/ML-MCNC: 0.44 NG/ML (ref 0–4.5)
RBC # BLD AUTO: 5 10E6/UL (ref 4.4–5.9)
SODIUM SERPL-SCNC: 136 MMOL/L (ref 135–145)
TRIGL SERPL-MCNC: 320 MG/DL
TSH SERPL DL<=0.005 MIU/L-ACNC: 4.57 UIU/ML (ref 0.3–4.2)
WBC # BLD AUTO: 6.4 10E3/UL (ref 4–11)

## 2025-05-06 PROCEDURE — 99396 PREV VISIT EST AGE 40-64: CPT | Performed by: INTERNAL MEDICINE

## 2025-05-06 PROCEDURE — 82043 UR ALBUMIN QUANTITATIVE: CPT | Performed by: INTERNAL MEDICINE

## 2025-05-06 PROCEDURE — 36415 COLL VENOUS BLD VENIPUNCTURE: CPT | Performed by: INTERNAL MEDICINE

## 2025-05-06 PROCEDURE — 84439 ASSAY OF FREE THYROXINE: CPT | Performed by: INTERNAL MEDICINE

## 2025-05-06 PROCEDURE — 80061 LIPID PANEL: CPT | Performed by: INTERNAL MEDICINE

## 2025-05-06 PROCEDURE — 82570 ASSAY OF URINE CREATININE: CPT | Performed by: INTERNAL MEDICINE

## 2025-05-06 PROCEDURE — 85027 COMPLETE CBC AUTOMATED: CPT | Performed by: INTERNAL MEDICINE

## 2025-05-06 PROCEDURE — 99214 OFFICE O/P EST MOD 30 MIN: CPT | Mod: 25 | Performed by: INTERNAL MEDICINE

## 2025-05-06 PROCEDURE — 83036 HEMOGLOBIN GLYCOSYLATED A1C: CPT | Performed by: INTERNAL MEDICINE

## 2025-05-06 PROCEDURE — 80053 COMPREHEN METABOLIC PANEL: CPT | Performed by: INTERNAL MEDICINE

## 2025-05-06 PROCEDURE — G0103 PSA SCREENING: HCPCS | Performed by: INTERNAL MEDICINE

## 2025-05-06 PROCEDURE — 84443 ASSAY THYROID STIM HORMONE: CPT | Performed by: INTERNAL MEDICINE

## 2025-05-06 RX ORDER — ALBUTEROL SULFATE 90 UG/1
2 INHALANT RESPIRATORY (INHALATION) EVERY 6 HOURS
Qty: 18 G | Refills: 3 | Status: SHIPPED | OUTPATIENT
Start: 2025-05-06

## 2025-05-06 RX ORDER — HYDROCHLOROTHIAZIDE 25 MG/1
25 TABLET ORAL DAILY
Qty: 90 TABLET | Refills: 3 | Status: SHIPPED | OUTPATIENT
Start: 2025-05-06

## 2025-05-06 RX ORDER — SIMVASTATIN 40 MG
TABLET ORAL
Qty: 90 TABLET | Refills: 3 | Status: SHIPPED | OUTPATIENT
Start: 2025-05-06

## 2025-05-06 RX ORDER — BLOOD SUGAR DIAGNOSTIC
STRIP MISCELLANEOUS
Qty: 200 STRIP | Refills: 3 | Status: SHIPPED | OUTPATIENT
Start: 2025-05-06

## 2025-05-06 RX ORDER — DULAGLUTIDE 1.5 MG/.5ML
1.5 INJECTION, SOLUTION SUBCUTANEOUS
Qty: 6 ML | Refills: 3 | Status: SHIPPED | OUTPATIENT
Start: 2025-05-06

## 2025-05-06 RX ORDER — LOSARTAN POTASSIUM 50 MG/1
TABLET ORAL
Qty: 180 TABLET | Refills: 2 | Status: SHIPPED | OUTPATIENT
Start: 2025-05-06

## 2025-05-06 RX ORDER — METFORMIN HYDROCHLORIDE 500 MG/1
2000 TABLET, EXTENDED RELEASE ORAL
Qty: 360 TABLET | Refills: 3 | Status: SHIPPED | OUTPATIENT
Start: 2025-05-06

## 2025-05-06 RX ORDER — PIOGLITAZONE 45 MG/1
45 TABLET ORAL DAILY
Qty: 90 TABLET | Refills: 3 | Status: SHIPPED | OUTPATIENT
Start: 2025-05-06

## 2025-05-06 RX ORDER — GLIPIZIDE 10 MG/1
10 TABLET, FILM COATED, EXTENDED RELEASE ORAL 2 TIMES DAILY
Qty: 180 TABLET | Refills: 3 | Status: SHIPPED | OUTPATIENT
Start: 2025-05-06

## 2025-05-06 RX ORDER — TADALAFIL 10 MG/1
10 TABLET ORAL DAILY PRN
Qty: 12 TABLET | Refills: 3 | Status: SHIPPED | OUTPATIENT
Start: 2025-05-06

## 2025-05-06 RX ORDER — DAPAGLIFLOZIN 10 MG/1
10 TABLET, FILM COATED ORAL DAILY
Qty: 90 TABLET | Refills: 3 | Status: SHIPPED | OUTPATIENT
Start: 2025-05-06

## 2025-05-06 ASSESSMENT — PAIN SCALES - GENERAL: PAINLEVEL_OUTOF10: MILD PAIN (3)

## 2025-05-06 NOTE — PROGRESS NOTES
Preventive Care Visit  [facility]  Ibrahima Smith MD, Internal Medicine  May 6, 2025      Assessment & Plan     Type 2 diabetes mellitus without complication, without long-term current use of insulin (H)  Assess diabetic control  Continue treatment  Keep diet and exercise   - HEMOGLOBIN A1C  - Lipid panel reflex to direct LDL Fasting  - Albumin Random Urine Quantitative with Creat Ratio  - dulaglutide (TRULICITY) 1.5 MG/0.5ML pen; Inject 1.5 mg subcutaneously every 7 days.  - dapagliflozin (FARXIGA) 10 MG TABS tablet; Take 1 tablet (10 mg) by mouth daily.  - glipiZIDE (GLUCOTROL XL) 10 MG 24 hr tablet; Take 1 tablet (10 mg) by mouth 2 times daily.  - metFORMIN (GLUCOPHAGE XR) 500 MG 24 hr tablet; Take 4 tablets (2,000 mg) by mouth daily (with dinner).  - pioglitazone (ACTOS) 45 MG tablet; Take 1 tablet (45 mg) by mouth daily.  - blood glucose (ONETOUCH ULTRA) test strip; Use to test blood sugar 1-2  times daily or as directed.  - simvastatin (ZOCOR) 40 MG tablet; TAKE 1 TABLET BY MOUTH EVERYDAY AT BEDTIME  - OFFICE/OUTPT VISIT,EST,LEVL IV    Mild intermittent asthma without complication  Controlled , uses PRN only treatment   - albuterol (PROAIR HFA/PROVENTIL HFA/VENTOLIN HFA) 108 (90 Base) MCG/ACT inhaler; Inhale 2 puffs into the lungs every 6 hours.  - OFFICE/OUTPT VISIT,EST,LEVL IV    Essential hypertension, benign  Controlled , continue medications   - hydrochlorothiazide (HYDRODIURIL) 25 MG tablet; Take 1 tablet (25 mg) by mouth daily.  - losartan (COZAAR) 50 MG tablet; TAKE 2 TABLETS BY MOUTH EVERY DAY  - CBC with platelets  - Comprehensive metabolic panel (BMP + Alb, Alk Phos, ALT, AST, Total. Bili, TP)  - PSA, screen  - TSH with free T4 reflex  - OFFICE/OUTPT VISIT,EST,LEVL IV    Screen for colon cancer    - Colonoscopy Screening  Referral; Future        Drug-induced erectile dysfunction  Start Cialis, advised for side effects   - tadalafil (CIALIS) 10 MG tablet; Take 1  "tablet (10 mg) by mouth daily as needed (ED).  - OFFICE/OUTPT VISIT,SUAD [place] IV    Encounter for preventative adult health care examination  advised regular aerobic activity, low cholesterol, low salt diet, wearing seat belt,  self examinations, sunscreen protection.Obtain screening cholesterol, immunizations reviewed.    - CBC with platelets  - Comprehensive metabolic panel (BMP + Alb, Alk Phos, ALT, AST, Total. Bili, TP)  - PSA, screen  - TSH with free T4 reflex    Screening for prostate cancer    - PSA, screen    Digital mucinous cyst  Refer to ortho for possible aspiration, surgery   - Orthopedic  Referral; Future  - OFFICE/OUTPT VISIT,EST,LEVL IV    Morbid obesity (H)  Weight loss advised   - OFFICE/OUTPT VISIT,SUAD [place] IV    Administrative encounter  [employer] form to be filled in , pt will bring in   - OFFICE/OUTPT VISIT,EST,LEVL IV    Patient has been advised of split billing requirements and indicates understanding: Yes        BMI  Estimated body mass index is 38.11 kg/m  as calculated from the following:    Height as of this encounter: 1.695 m (5' 6.75\").    Weight as of this encounter: 109.5 kg (241 lb 8 oz).   Weight management plan: Discussed healthy diet and exercise guidelines    Counseling  Appropriate preventive services were addressed with this patient via screening, questionnaire, or discussion as appropriate for fall prevention, nutrition, physical activity, Tobacco-use cessation, social engagement, weight loss and cognition.  Checklist reviewing preventive services available has been given to the patient.  Reviewed patient's diet, addressing concerns and/or questions.   He is at risk for lack of exercise and has been provided with information to increase physical activity for the benefit of his well-being.       Follow-up    Follow-up Visit   Expected date:  May 06, 2026 (Approximate)      Follow Up Appointment Details:     Follow-up with whom?: Me    Follow-Up for what?: Adult " Preventive    How?: In Person                 Tracey Mccauley is a 64 year old, presenting for the following:  Physical (fasting)        5/6/2025     9:25 AM   Additional Questions   Roomed by Dionne GAINES   Accompanied by n/a          HPI  Has h/o HTN. on medical treatment. BP has been controlled. No side effects from medications. No CP, HA, dizziness. good compliance with medications and low salt diet.  Has  history of  DM. On diet , exercise and oral treatment and trulicity injectoins. Blood sugars are controlled. Checking 1-2 times daily. No paresthesias. No hypoglycemias.  Has H/O hyperlipidemia. On medical treatment and diet. No side effects. No muscle weakness, myalgias or upset stomach.   Has h/o obesity. Not able to lose weight. Has had some problems with LBP and knee pains. Has h/o disc prolapse in the LS spine and knees OA.   Reports concerns for ED.   Has history of asthma. On PRN Albuterol inhaler. No flare up.    Concern for a lump on the right index finger painful if pressure applied to it         Advance Care Planning    Discussed advance care planning with patient; informed AVS has link to [facility] [employer].        5/5/2025   General Health   How would you rate your overall physical health? (!) FAIR   Feel stress (tense, anxious, or unable to sleep) Only a little   (!) STRESS CONCERN      5/5/2025   Nutrition   Three or more servings of calcium each day? Yes   Diet: Regular (no restrictions)    Low salt   How many servings of fruit and vegetables per day? (!) 2-3   How many sweetened beverages each day? 0-1       Multiple values from one day are sorted in reverse-chronological order         5/5/2025   Exercise   Days per week of moderate/strenous exercise 2 days   Average minutes spent exercising at this level 40 min   (!) EXERCISE CONCERN      5/5/2025   Social Factors   Frequency of gathering with friends or relatives Once a week   Worry food won't last until get money to buy more No   Food not last  or not have enough money for food? No   Do you have housing? (Housing is defined as stable permanent housing and does not include staying outside in a car, in a tent, in an abandoned building, in an overnight shelter, or couch-surfing.) Yes   Are you worried about losing your housing? No   Lack of transportation? No   Unable to get utilities (heat,electricity)? No         5/5/2025   Fall Risk   Fallen 2 or more times in the past year? No   Trouble with walking or balance? No          5/5/2025   Dental   Dentist two times every year? Yes         Today's PHQ-2 Score:       5/5/2025     3:54 PM   PHQ-2 ( 1999 [employer])   Q1: Little interest or pleasure in doing things 0   Q2: Feeling down, depressed or hopeless 0   PHQ-2 Score 0    Q1: Little interest or pleasure in doing things Not at all   Q2: Feeling down, depressed or hopeless Not at all   PHQ-2 Score 0       Patient-reported           5/5/2025   Substance Use   Alcohol more than 3/day or more than 7/wk No   Do you use any other substances recreationally? No     Social History     Tobacco Use    Smoking status: Never    Smokeless tobacco: Never   Vaping Use    Vaping status: Never Used   Substance Use Topics    Alcohol use: Yes     Comment: infrequent    Drug use: No           5/5/2025   STI Screening   New sexual partner(s) since last STI/HIV test? No   Last PSA:   PSA   Date Value Ref Range Status   04/21/2021 0.33 0 - 4 ug/L Final     Comment:     Assay Method:  Chemiluminescence using Siemens Vista analyzer     Prostate Specific Antigen Screen   Date Value Ref Range Status   05/07/2024 0.37 0.00 - 4.50 ng/mL Final   05/11/2022 0.40 0.00 - 4.00 ug/L Final     ASCVD Risk   The 10-year ASCVD risk score (Brii LEWIS, et al., 2019) is: 22.2%    Values used to calculate the score:      Age: 64 years      Sex: Male      Is Non- : No      Diabetic: Yes      Tobacco smoker: No      Systolic Blood Pressure: 125 mmHg      Is BP treated: Yes     "  HDL Cholesterol: 49 mg/dL      Total Cholesterol: 176 mg/dL           Reviewed and updated as needed this visit by Provider                    Lab work is in process  Labs reviewed in EPIC      Review of Systems  Constitutional, HEENT, cardiovascular, pulmonary, GI, , musculoskeletal, neuro, skin, endocrine and psych systems are negative, except as otherwise noted.     Objective    Exam  /85 (BP Location: Left arm, Cuff Size: Adult Large)   Pulse 85   Temp 97.9  F (36.6  C) (Tympanic)   Resp 14   Ht 1.695 m (5' 6.75\")   Wt 109.5 kg (241 lb 8 oz)   SpO2 96%   BMI 38.11 kg/m     Estimated body mass index is 38.11 kg/m  as calculated from the following:    Height as of this encounter: 1.695 m (5' 6.75\").    Weight as of this encounter: 109.5 kg (241 lb 8 oz).    Physical Exam  GENERAL: alert and no distress, obese   EYES: Eyes grossly normal to inspection, PERRL and conjunctivae and sclerae normal  HENT: ear canals and TM's normal, nose and mouth without ulcers or lesions  NECK: no adenopathy, no asymmetry, masses, or scars  RESP: lungs clear to auscultation - no rales, rhonchi or wheezes  CV: regular rate and rhythm, normal S1 S2, no S3 or S4, no murmur, click or rub, no peripheral edema  ABDOMEN: soft, nontender, no hepatosplenomegaly, no masses and bowel sounds normal  MS: no gross musculoskeletal defects noted, no edema, right index finger 6 mm cystic lesion on the distal phalanx, extensor surface, next to the nail  SKIN: no suspicious lesions or rashes  NEURO: Normal strength and tone, mentation intact and speech normal  PSYCH: mentation appears normal, affect normal/bright        Signed Electronically by: Ibrahima Smith MD    "

## 2025-05-07 ENCOUNTER — RESULTS FOLLOW-UP (OUTPATIENT)
Dept: INTERNAL MEDICINE | Facility: CLINIC | Age: 65
End: 2025-05-07

## 2025-05-07 ENCOUNTER — PATIENT OUTREACH (OUTPATIENT)
Dept: CARE COORDINATION | Facility: CLINIC | Age: 65
End: 2025-05-07
Payer: COMMERCIAL

## 2025-05-07 LAB — T4 FREE SERPL-MCNC: 1.37 NG/DL (ref 0.9–1.7)

## 2025-05-16 ENCOUNTER — TELEPHONE (OUTPATIENT)
Dept: INTERNAL MEDICINE | Facility: CLINIC | Age: 65
End: 2025-05-16
Payer: COMMERCIAL

## 2025-06-22 SDOH — HEALTH STABILITY: PHYSICAL HEALTH: ON AVERAGE, HOW MANY DAYS PER WEEK DO YOU ENGAGE IN MODERATE TO STRENUOUS EXERCISE (LIKE A BRISK WALK)?: 3 DAYS

## 2025-06-22 SDOH — HEALTH STABILITY: PHYSICAL HEALTH: ON AVERAGE, HOW MANY MINUTES DO YOU ENGAGE IN EXERCISE AT THIS LEVEL?: 40 MIN

## 2025-06-23 ENCOUNTER — OFFICE VISIT (OUTPATIENT)
Dept: ORTHOPEDICS | Facility: CLINIC | Age: 65
End: 2025-06-23
Payer: COMMERCIAL

## 2025-06-23 ENCOUNTER — ANCILLARY PROCEDURE (OUTPATIENT)
Dept: GENERAL RADIOLOGY | Facility: CLINIC | Age: 65
End: 2025-06-23
Attending: STUDENT IN AN ORGANIZED HEALTH CARE EDUCATION/TRAINING PROGRAM
Payer: COMMERCIAL

## 2025-06-23 DIAGNOSIS — M67.449 DIGITAL MUCINOUS CYST: ICD-10-CM

## 2025-06-23 PROCEDURE — 99203 OFFICE O/P NEW LOW 30 MIN: CPT | Performed by: STUDENT IN AN ORGANIZED HEALTH CARE EDUCATION/TRAINING PROGRAM

## 2025-06-23 NOTE — LETTER
6/23/2025      Garrick Espinosa  [address]  [place] [address]      Dear Colleague,    Thank you for referring your patient, Garrikc Espinosa, to the [facility]. Please see a copy of my visit note below.    ASSESSMENT & PLAN    Garrick was seen today for edema.    Diagnoses and all orders for this visit:    Digital mucinous cyst  -     Orthopedic  Referral  -     XR Finger Left G/E 2 Views; Future      This issue is chronic and Unchanged. Garrick presents our clinic today to discuss his cyst of the left third finger.  He reports a cyst beginning about 7 months ago just adjacent/proximal to the nailbed on the middle finger.  It was previously larger, and this pressure affected the nailbed making it ridged and causing some splitting of the nailbed.  It has since decreased in size and his nailbed is beginning to become more normal-appearing.  It is mildly painful when it is directly injured.  On examination, there is a about 1 cm myxoid cyst at the left third finger.  We discussed the treatment options including observation, aspiration, and surgical excision given it has caused disorders within the nailbed, would recommend he see one of my hand surgery partners to discuss possible excision versus other therapies prior to performing any aspiration today.  We determine the following plan:  - Patient was scheduled with Dr. Sudhir Puentes MD to review his cyst and discuss possible treatment options  - He can follow-up with me as needed      Alex Robles,   [facility]    -----  Chief Complaint   Patient presents with     Left Middle Finger - Edema       SUBJECTIVE  Garrick Espinosa is a/an 64 year old male who is seen in consultation at the request of  Ibrahima Smith M.D. for evaluation of left hand - 3rd finger.     The patient is seen by themselves.  The patient is Right handed    Onset: 7 month(s) ago. Reports insidious  onset without acute precipitating event.  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   General: healthy, alert and in no distress  Skin: no suspicious lesions or rash.  CV: distal perfusion intact   Resp: normal respiratory effort without conversational dyspnea   Psych: normal mood and affect  Gait: NORMAL  Neuro: Normal light sensory exam of SUMEET extremity     Hand Exam    Left  Right   Inspection No atrophy, erythema , echymosis, or deformity  No atrophy, erythema , echymosis, or deformity    Palpation         Tenderness over  1 cm mucinous cyst proximal to nailbed of 3rd finger  No tenderness to palpation of radial styloid, DRUJ, TFCC, scaphoid/snuffbox   No tenderness to palpation of radial styloid, DRUJ, TFCC, scaphoid/snuffbox   Range of Motion        1st digit IP flex/ext Normal Normal      DIP flexion/extension  Normal 2nd-5th Normal 2nd-5th      PIP flexion/extension  Normal 2nd-5th Normal 2nd-5th      MCP flexion/extension Normal Normal   Strength      1st digit IP flex/ext Full Full    DIP flexion/extension Full Full    PIP flexion/extension  Full Full    MCP flexion/extension Full Full   Pain with resisted None None   Vascular   Intact  Intact    Special Tests   1st MCP valgus 0/30 normal   Neg CMC grind  No triggering evident  Able to make 'OK' sign (AIN)  Intact resisted abduction of digits    Sensation Intact to median, ulnar, and radial nerve distributions          RADIOLOGY:  Final results and radiologist's interpretation, available in the [facility] health record.  Images were reviewed with the patient in the office  today.  My personal interpretation of the performed imaging: Normal joint spacing and alignment of the joints of the third finger.  No acute fractures or bony abnormalities.             Again, thank you for allowing me to participate in the care of your patient.        Sincerely,        Alex Robles, DO    Electronically signed

## 2025-06-23 NOTE — PROGRESS NOTES
ASSESSMENT & PLAN    Garrick was seen today for edema.    Diagnoses and all orders for this visit:    Digital mucinous cyst  -     Orthopedic  Referral  -     XR Finger Left G/E 2 Views; Future      This issue is chronic and Unchanged. Garrick presents our clinic today to discuss his cyst of the left third finger.  He reports a cyst beginning about 7 months ago just adjacent/proximal to the nailbed on the middle finger.  It was previously larger, and this pressure affected the nailbed making it ridged and causing some splitting of the nailbed.  It has since decreased in size and his nailbed is beginning to become more normal-appearing.  It is mildly painful when it is directly injured.  On examination, there is a about 1 cm myxoid cyst at the left third finger.  We discussed the treatment options including observation, aspiration, and surgical excision given it has caused disorders within the nailbed, would recommend he see one of my hand surgery partners to discuss possible excision versus other therapies prior to performing any aspiration today.  We determine the following plan:  - Patient was scheduled with Dr. Sudhir Puentes MD to review his cyst and discuss possible treatment options  - He can follow-up with me as needed      Alex Robles [facility] [place]    -----  Chief Complaint   Patient presents with    Left Middle Finger - Edema       SUBJECTIVE  Garrick Espinosa is a/an 64 year old male who is seen in consultation at the request of  Ibrahima Smith M.D. for evaluation of left hand - 3rd finger.     The patient is seen by themselves.  The patient is Right handed    Onset: 7 month(s) ago. Reports insidious onset without acute precipitating event.  Location of Pain: left middle finger over distal phalanx   Worsened by: tender to touch  Better with: nothing  Treatments tried: rest/activity avoidance and topical steroid cream  Associated symptoms: swelling /  Patient: Toya Barahona Dk    Procedure Summary       Date: 01/27/24 Room / Location:     Anesthesia Start: 0228 Anesthesia Stop: 0712    Procedure: LABOR ANALGESIA Diagnosis:     Scheduled Providers:  Provider: Xu Borja DO    Anesthesia Type: epidural ASA Status: 3            Anesthesia Type: epidural    Vitals  Vitals Value Taken Time   /71 01/28/24 1530   Temp 98.3 °F (36.8 °C) 01/28/24 1530   Pulse 86 01/28/24 1530   Resp 16 01/28/24 1530   SpO2 98 % 01/28/24 1530           Post Anesthesia Care and Evaluation    Patient location during evaluation: bedside  Patient participation: complete - patient participated  Level of consciousness: awake  Pain score: 0  Pain management: satisfactory to patient    Airway patency: patent  Anesthetic complications: No anesthetic complications  PONV Status: none  Cardiovascular status: acceptable and hemodynamically stable  Respiratory status: acceptable  Hydration status: acceptable  Post Neuraxial Block status: Motor and sensory function returned to baseline and No signs or symptoms of PDPH     bump    Orthopedic/Surgical history: NO  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   General: healthy, alert and in no distress  Skin: no suspicious lesions or rash.  CV: distal perfusion intact   Resp: normal respiratory effort without conversational dyspnea   Psych: normal mood and affect  Gait: NORMAL  Neuro: Normal light sensory exam of SUMEET extremity     Hand Exam    Left  Right   Inspection No atrophy, erythema , echymosis, or deformity  No atrophy, erythema , echymosis, or deformity    Palpation         Tenderness over  1 cm mucinous cyst proximal to nailbed of 3rd finger  No tenderness to palpation of radial styloid, DRUJ, TFCC, scaphoid/snuffbox   No tenderness to palpation of radial styloid, DRUJ, TFCC, scaphoid/snuffbox   Range of Motion        1st digit IP flex/ext Normal Normal      DIP flexion/extension  Normal 2nd-5th Normal 2nd-5th      PIP flexion/extension  Normal 2nd-5th Normal 2nd-5th      MCP flexion/extension Normal Normal   Strength      1st digit IP flex/ext Full Full    DIP flexion/extension Full Full    PIP flexion/extension  Full Full    MCP flexion/extension Full Full   Pain with resisted None None   Vascular   Intact  Intact    Special Tests   1st MCP valgus 0/30 normal   Neg CMC grind  No triggering evident  Able to make 'OK' sign (AIN)  Intact resisted abduction of digits    Sensation Intact to median, ulnar, and radial nerve distributions          RADIOLOGY:  Final results and radiologist's interpretation, available in the [facility] health record.  Images were reviewed with the patient in the office today.  My personal interpretation of the performed imaging: Normal joint spacing and alignment of the joints of the third finger.  No acute fractures or bony abnormalities.

## 2025-06-23 NOTE — PROGRESS NOTES
[facility] H&P Note:  Date: Jun 24, 2025     Patient Name: Garrick Espinosa  MRN: [id number]    Consult requested by: Alex Robles DO    CHIEF COMPLAINT: Left middle finger digital mucous cyst    Dominant Hand: Right  Occupation: Computer work      HPI:  Mr. Garrick Espinosa is a 64 year old  male right hand dominant who presents with digital mucous cyst of left middle  finger.  Present since November 2024.  No injury or trauma.  Is not painful at rest, occasionally tender when he bumps it.  He was initially concerned because of the depression deformity of the nail.  It would get caught on things distally.  However the nail deformity has improved and overall he states the cyst is no longer bothersome.    PMH  DiabetesType 2  Thyroid Problems:  No  Smoking Y/N: N      PAST MEDICAL HISTORY:  Past Medical History:   Diagnosis Date    Allergic rhinitis, cause unspecified     Diabetes mellitus (H) 6/09     Essential hypertension, benign     Other and unspecified hyperlipidemia     Psoriasis     Sciatica     R leg radiation; remote;; resolved        PAST SURGICAL HISTORY:  Past Surgical History:   Procedure Laterality Date    COLONOSCOPY N/A 3/29/2019    Procedure: COMBINED COLONOSCOPY, SINGLE OR MULTIPLE BIOPSY/POLYPECTOMY BY BIOPSY polypectomy of colon polyps by cold jumbo biopsies;  Surgeon: Jeff Elias MD;  Location:  GI    NECK SURGERY  2011    hemilaminectomy     ZZC NONSPECIFIC PROCEDURE  12/06    R ankle surgery        MEDICATIONS:  Current Outpatient Medications   Medication Sig Dispense Refill    albuterol (PROAIR HFA/PROVENTIL HFA/VENTOLIN HFA) 108 (90 Base) MCG/ACT inhaler Inhale 2 puffs into the lungs every 6 hours. 18 g 3    ASPIRIN PO Take 162 mg by mouth daily      blood glucose (ONETOUCH ULTRA) test strip Use to test blood sugar 1-2  times daily or as directed. 200 strip 3    blood glucose calibration (NO BRAND SPECIFIED) solution To accompany:  Blood Glucose Monitor Brands: per insurance. 1 Bottle 3    blood glucose monitoring (ONE TOUCH ULTRA 2) meter device kit USE TO TEST BLOOD SUGAR 1-2 TIMES DAILY OR AS DIRECTED. 1 kit 0    dapagliflozin (FARXIGA) 10 MG TABS tablet Take 1 tablet (10 mg) by mouth daily. 90 tablet 3    doxylamine (UNISOM) 25 MG TABS tablet Take 25 mg by mouth At Bedtime      dulaglutide (TRULICITY) 1.5 MG/0.5ML pen Inject 1.5 mg subcutaneously every 7 days. 6 mL 3    glipiZIDE (GLUCOTROL XL) 10 MG 24 hr tablet Take 1 tablet (10 mg) by mouth 2 times daily. 180 tablet 3    hydrochlorothiazide (HYDRODIURIL) 25 MG tablet Take 1 tablet (25 mg) by mouth daily. 90 tablet 3    indomethacin (INDOCIN) 50 MG capsule Take 1 capsule (50 mg) by mouth 2 times daily (with meals) 30 capsule 0    losartan (COZAAR) 50 MG tablet TAKE 2 TABLETS BY MOUTH EVERY  tablet 2    MELATONIN PO Take by mouth nightly as needed      metFORMIN (GLUCOPHAGE XR) 500 MG 24 hr tablet Take 4 tablets (2,000 mg) by mouth daily (with dinner). 360 tablet 3    MULTI-DAY VITAMINS OR TABS 1 TABLET DAILY      pioglitazone (ACTOS) 45 MG tablet Take 1 tablet (45 mg) by mouth daily. 90 tablet 3    simvastatin (ZOCOR) 40 MG tablet TAKE 1 TABLET BY MOUTH EVERYDAY AT BEDTIME 90 tablet 3    tadalafil (CIALIS) 10 MG tablet Take 1 tablet (10 mg) by mouth daily as needed (ED). 12 tablet 3    Thiamine HCl (VITAMIN B-1 PO) Take by mouth daily Taking on summer time only      thin (NO BRAND SPECIFIED) lancets Use with lanceting device. To accompany: Blood Glucose Monitor Brands: per insurance. 100 each 2    triamcinolone (KENALOG) 0.1 % external cream APPLY THIN LAYER TO RASH ON SKIN TWICE DAILY FOR NO MORE THAN 10-14 DAYS 80 g 3    TYLENOL EXTRA STRENGTH 500 MG PO TABS        No current facility-administered medications for this visit.       ALLERGIES:     Allergies   Allergen Reactions    Ace Inhibitors      cough    Cats     Dogs     Quinapril Hcl      tablets    Seasonal Allergies         FAMILY HISTORY:  No pertinent family history    SOCIAL HISTORY:  Social History     Tobacco Use    Smoking status: Never    Smokeless tobacco: Never   Vaping Use    Vaping status: Never Used   Substance Use Topics    Alcohol use: Yes     Comment: infrequent    Drug use: No       The patient's past medical, family, and social history was reviewed and confirmed.    REVIEW OF SYMPTOMS:      General: Negative   Eyes: Negative   Ear, Nose and Throat: Negative   Respiratory: Negative   Cardiovascular: Negative   Gastrointestinal: Negative   Genito-urinary: Negative   Musculoskeletal: see HPI  Neurological: Negative   Psychological: Negative  HEME: Negative   ENDO: Negative   SKIN: Negative    VITALS:  There were no vitals filed for this visit.    EXAM:  General: NAD, A&Ox3  HEENT: NC/AT  CV: RRR by peripheral pulse  Pulmonary: Non-labored breathing on RA  LUE:  5 mm digital mucous cyst over the dorsal aspect of the left middle finger eponychial fold, causing mild nail depression changes  No evidence of infection  Intact FDP FDS, EDC  Sensation intact to light touch all distributions  Well-perfused, cap refill less than 2 seconds    LABS:  Hemoglobin A1c 8.2 5/6/2025    IMAGING:    X-rays of the left middle finger 6/23/2025 demonstrates mild degenerative changes with small dorsal osteophyte of the distal phalanx at the DIP joint    I have personally reviewed the above images and labs.         IMPRESSION AND RECOMMENDATIONS:  Mr. Garrick Espinosa is a 64 year old male right hand dominant with left middle finger digital mucous cyst    I discussed the diagnosis, prognosis, and treatment options with the patient.  I advised that the majority of the cysts will resolve spontaneously within the year.  Given that his cyst and nail deformity seem to be improving, the patient wishes to continue to monitor for now.  If it gets bigger, more bothersome, or the deformity gets worse, he will consider surgical excision.  We briefly  discussed the details of the surgery.  Will be performed under local anesthesia.    All questions answered.  The patient voices understanding and agreement.  Follow-up with me as needed.    Sudhir Puentes MD    Hand, Upper Extremity & Microvascular Surgery  Department of Orthopaedic Surgery  [facility]

## 2025-06-24 ENCOUNTER — OFFICE VISIT (OUTPATIENT)
Dept: ORTHOPEDICS | Facility: CLINIC | Age: 65
End: 2025-06-24
Payer: COMMERCIAL

## 2025-06-24 DIAGNOSIS — M67.449 DIGITAL MUCINOUS CYST: Primary | ICD-10-CM

## 2025-06-24 PROCEDURE — 99203 OFFICE O/P NEW LOW 30 MIN: CPT | Performed by: STUDENT IN AN ORGANIZED HEALTH CARE EDUCATION/TRAINING PROGRAM

## 2025-06-24 NOTE — LETTER
6/24/2025      Garrick Espinosa  [address]  [place] [facility] [address]      Dear Colleague,    Thank you for referring your patient, Garrick Espinosa, to the [facility] [place]. Please see a copy of my visit note below.    Orthopaedic Surgery [facility] Clinic H&P Note:  Date: Jun 24, 2025     Patient Name: Garrick Espinosa  MRN: [id number]    Consult requested by: Alex Robles DO    CHIEF COMPLAINT: Left middle finger digital mucous cyst    Dominant Hand: Right  Occupation: Computer work      HPI:  Mr. Garrick Espinosa is a 64 year old  male right hand dominant who presents with digital mucous cyst of left middle  finger.  Present since November 2024.  No injury or trauma.  Is not painful at rest, occasionally tender when he bumps it.  He was initially concerned because of the depression deformity of the nail.  It would get caught on things distally.  However the nail deformity has improved and overall he states the cyst is no longer bothersome.    PMH  DiabetesType 2  Thyroid Problems:  No  Smoking Y/N: N      PAST MEDICAL HISTORY:  Past Medical History:   Diagnosis Date     Allergic rhinitis, cause unspecified      Diabetes mellitus (H) 6/09      Essential hypertension, benign      Other and unspecified hyperlipidemia      Psoriasis      Sciatica     R leg radiation; remote;; resolved        PAST SURGICAL HISTORY:  Past Surgical History:   Procedure Laterality Date     COLONOSCOPY N/A 3/29/2019    Procedure: COMBINED COLONOSCOPY, SINGLE OR MULTIPLE BIOPSY/POLYPECTOMY BY BIOPSY polypectomy of colon polyps by cold jumbo biopsies;  Surgeon: Jeff Elias MD;  Location:  GI     NECK SURGERY  2011    hemilaminectomy      ZZC NONSPECIFIC PROCEDURE  12/06    R ankle surgery        MEDICATIONS:  Current Outpatient Medications   Medication Sig Dispense Refill     albuterol (PROAIR HFA/PROVENTIL HFA/VENTOLIN HFA) 108 (90 Base) MCG/ACT inhaler Inhale 2 puffs into  the lungs every 6 hours. 18 g 3     ASPIRIN PO Take 162 mg by mouth daily       blood glucose (ONETOUCH ULTRA) test strip Use to test blood sugar 1-2  times daily or as directed. 200 strip 3     blood glucose calibration (NO BRAND SPECIFIED) solution To accompany: Blood Glucose Monitor Brands: per insurance. 1 Bottle 3     blood glucose monitoring (ONE TOUCH ULTRA 2) meter device kit USE TO TEST BLOOD SUGAR 1-2 TIMES DAILY OR AS DIRECTED. 1 kit 0     dapagliflozin (FARXIGA) 10 MG TABS tablet Take 1 tablet (10 mg) by mouth daily. 90 tablet 3     doxylamine (UNISOM) 25 MG TABS tablet Take 25 mg by mouth At Bedtime       dulaglutide (TRULICITY) 1.5 MG/0.5ML pen Inject 1.5 mg subcutaneously every 7 days. 6 mL 3     glipiZIDE (GLUCOTROL XL) 10 MG 24 hr tablet Take 1 tablet (10 mg) by mouth 2 times daily. 180 tablet 3     hydrochlorothiazide (HYDRODIURIL) 25 MG tablet Take 1 tablet (25 mg) by mouth daily. 90 tablet 3     indomethacin (INDOCIN) 50 MG capsule Take 1 capsule (50 mg) by mouth 2 times daily (with meals) 30 capsule 0     losartan (COZAAR) 50 MG tablet TAKE 2 TABLETS BY MOUTH EVERY  tablet 2     MELATONIN PO Take by mouth nightly as needed       metFORMIN (GLUCOPHAGE XR) 500 MG 24 hr tablet Take 4 tablets (2,000 mg) by mouth daily (with dinner). 360 tablet 3     MULTI-DAY VITAMINS OR TABS 1 TABLET DAILY       pioglitazone (ACTOS) 45 MG tablet Take 1 tablet (45 mg) by mouth daily. 90 tablet 3     simvastatin (ZOCOR) 40 MG tablet TAKE 1 TABLET BY MOUTH EVERYDAY AT BEDTIME 90 tablet 3     tadalafil (CIALIS) 10 MG tablet Take 1 tablet (10 mg) by mouth daily as needed (ED). 12 tablet 3     Thiamine HCl (VITAMIN B-1 PO) Take by mouth daily Taking on summer time only       thin (NO BRAND SPECIFIED) lancets Use with lanceting device. To accompany: Blood Glucose Monitor Brands: per insurance. 100 each 2     triamcinolone (KENALOG) 0.1 % external cream APPLY THIN LAYER TO RASH ON SKIN TWICE DAILY FOR NO MORE THAN  10-14 DAYS 80 g 3     TYLENOL EXTRA STRENGTH 500 MG PO TABS        No current facility-administered medications for this visit.       ALLERGIES:     Allergies   Allergen Reactions     Ace Inhibitors      cough     Cats      Dogs      Quinapril Hcl      tablets     Seasonal Allergies        FAMILY HISTORY:  No pertinent family history    SOCIAL HISTORY:  Social History     Tobacco Use     Smoking status: Never     Smokeless tobacco: Never   Vaping Use     Vaping status: Never Used   Substance Use Topics     Alcohol use: Yes     Comment: infrequent     Drug use: No       The patient's past medical, family, and social history was reviewed and confirmed.    REVIEW OF SYMPTOMS:      General: Negative   Eyes: Negative   Ear, Nose and Throat: Negative   Respiratory: Negative   Cardiovascular: Negative   Gastrointestinal: Negative   Genito-urinary: Negative   Musculoskeletal: see HPI  Neurological: Negative   Psychological: Negative  HEME: Negative   ENDO: Negative   SKIN: Negative    VITALS:  There were no vitals filed for this visit.    EXAM:  General: NAD, A&Ox3  HEENT: NC/AT  CV: RRR by peripheral pulse  Pulmonary: Non-labored breathing on RA  LUE:  5 mm digital mucous cyst over the dorsal aspect of the left middle finger eponychial fold, causing mild nail depression changes  No evidence of infection  Intact FDP FDS, EDC  Sensation intact to light touch all distributions  Well-perfused, cap refill less than 2 seconds    LABS:  Hemoglobin A1c 8.2 5/6/2025    IMAGING:    X-rays of the left middle finger 6/23/2025 demonstrates mild degenerative changes with small dorsal osteophyte of the distal phalanx at the DIP joint    I have personally reviewed the above images and labs.         IMPRESSION AND RECOMMENDATIONS:  Mr. Garrick Espinosa is a 64 year old male right hand dominant with left middle finger digital mucous cyst    I discussed the diagnosis, prognosis, and treatment options with the patient.  I advised that the  majority of the cysts will resolve spontaneously within the year.  Given that his cyst and nail deformity seem to be improving, the patient wishes to continue to monitor for now.  If it gets bigger, more bothersome, or the deformity gets worse, he will consider surgical excision.  We briefly discussed the details of the surgery.  Will be performed under local anesthesia.    All questions answered.  The patient voices understanding and agreement.  Follow-up with me as needed.    Sudhir Puentes MD    Hand, Upper Extremity & Microvascular Surgery  Department of Orthopaedic Surgery  [facility]          Again, thank you for allowing me to participate in the care of your patient.        Sincerely,        Sudhir Puentes MD    Electronically signed

## (undated) DEVICE — KIT ENDO TURNOVER/PROCEDURE W/CLEAN A SCOPE LINERS 103888

## (undated) DEVICE — ENDO FORCEP BX CAPTURA JUMBO SPIKE 2.8MMX230CM G53042

## (undated) RX ORDER — FENTANYL CITRATE 50 UG/ML
INJECTION, SOLUTION INTRAMUSCULAR; INTRAVENOUS
Status: DISPENSED
Start: 2019-03-29